# Patient Record
Sex: FEMALE | Race: WHITE | NOT HISPANIC OR LATINO | Employment: PART TIME | URBAN - METROPOLITAN AREA
[De-identification: names, ages, dates, MRNs, and addresses within clinical notes are randomized per-mention and may not be internally consistent; named-entity substitution may affect disease eponyms.]

---

## 2017-01-23 ENCOUNTER — OFFICE VISIT (OUTPATIENT)
Dept: MEDICAL GROUP | Facility: PHYSICIAN GROUP | Age: 67
End: 2017-01-23
Payer: COMMERCIAL

## 2017-01-23 VITALS
OXYGEN SATURATION: 97 % | DIASTOLIC BLOOD PRESSURE: 78 MMHG | WEIGHT: 180 LBS | SYSTOLIC BLOOD PRESSURE: 118 MMHG | HEIGHT: 63 IN | HEART RATE: 77 BPM | TEMPERATURE: 97.2 F | BODY MASS INDEX: 31.89 KG/M2

## 2017-01-23 DIAGNOSIS — E78.2 MIXED HYPERLIPIDEMIA: ICD-10-CM

## 2017-01-23 DIAGNOSIS — Z88.9 MULTIPLE ALLERGIES: ICD-10-CM

## 2017-01-23 DIAGNOSIS — J45.20 MILD INTERMITTENT ASTHMA WITHOUT COMPLICATION: ICD-10-CM

## 2017-01-23 DIAGNOSIS — E66.9 OBESITY (BMI 30-39.9): ICD-10-CM

## 2017-01-23 PROCEDURE — 99214 OFFICE O/P EST MOD 30 MIN: CPT | Performed by: FAMILY MEDICINE

## 2017-01-23 NOTE — MR AVS SNAPSHOT
"        Greta Tran   2017 5:00 PM   Office Visit   MRN: [id number]    Department:  [facility]   Dept Phone:  [phone]    Description:  Female : 1950   Provider:  Karlie Dimas D.O.           Reason for Visit     Establish Care           Allergies as of 2017     Allergen Noted Reactions    Cephalexin 2015   Nausea    Food 2015       Raspberry, carrots, green pepper, white potatoes, tomato, pork, turkey, tree nuts (cashew, hazelnut, walnut)    Levaquin 2015   Swelling    Shaky, throat swelling, joint pain      You were diagnosed with     Mild intermittent asthma without complication   [779791]       Multiple allergies   [509939]       Mixed hyperlipidemia   [272.2.ICD-9-CM]         Vital Signs     Blood Pressure Pulse Temperature Height Weight Body Mass Index    118/78 mmHg 77 36.2 °C (97.2 °F) 1.6 m (5' 3\") 81.647 kg (180 lb) 31.89 kg/m2    Oxygen Saturation Smoking Status                97% Former Smoker          Basic Information     Date Of Birth Sex Race Ethnicity Preferred Language    1950 Female White Non- English      Your appointments     2018  5:00 PM   Established Patient with Karlie Dimas D.O.   [address] [id number] [phone]           You will be receiving a confirmation call a few days before your appointment from our automated call confirmation system.              Problem List              ICD-10-CM Priority Class Noted - Resolved    Calculus of gallbladder K80.20   2015 - Present    Mild intermittent asthma without complication J45.20   2017 - Present    Multiple allergies Z88.9   2017 - Present    Mixed hyperlipidemia E78.2   2017 - Present      Health Maintenance        Date Due Completion Dates    IMM DTaP/Tdap/Td Vaccine (1 - Tdap) 5/3/1971 ---    PAP SMEAR 5/3/1973 ---    MAMMOGRAM 5/3/1992 ---    COLONOSCOPY 5/3/2002 ---            "   Current Immunizations     Influenza TIV (IM) 9/5/2015, 10/20/2014, 10/10/2011    Influenza Vaccine Quad Inj (Preserved) 9/23/2016    SHINGLES VACCINE 10/7/2014      Below and/or attached are the medications your provider expects you to take. Review all of your home medications and newly ordered medications with your provider and/or pharmacist. Follow medication instructions as directed by your provider and/or pharmacist. Please keep your medication list with you and share with your provider. Update the information when medications are discontinued, doses are changed, or new medications (including over-the-counter products) are added; and carry medication information at all times in the event of emergency situations     Allergies:  CEPHALEXIN - Nausea     FOOD - (reactions not documented)     LEVAQUIN - Swelling               Medications  Valid as of: January 23, 2017 -  5:38 PM    Generic Name Brand Name Tablet Size Instructions for use    Albuterol Sulfate (Aero Soln) albuterol 108 (90 BASE) MCG/ACT Inhale 1-2 Puffs by mouth every four hours as needed for Shortness of Breath.        Beclomethasone Dipropionate (Aero Soln) QVAR 80 MCG/ACT Inhale 1 Puff by mouth 2 times a day.        Fenofibrate (Tab) TRICOR 145 MG Take 145 mg by mouth every day.        Fexofenadine HCl (Tab) ALLEGRA 60 MG Take 60 mg by mouth every day.        Fluticasone Furoate (Suspension) VERAMYST 27.5 MCG/SPRAY Spray 2 Sprays in nose every day.        Fluticasone Propionate   Spray  in nose.        Fluticasone-Salmeterol   Inhale  by mouth.        .                 Medicines prescribed today were sent to:     [id number] [place] [place] - [address]    [address] [facility]. [place] [address]    Phone: [phone] Fax: [phone]    Open 24 Hours?: No      Medication refill instructions:       If your prescription bottle indicates you have medication refills left, it is not necessary to call your provider’s office. Please  contact your pharmacy and they will refill your medication.    If your prescription bottle indicates you do not have any refills left, you may request refills at any time through one of the following ways: The online [employer] system (except Urgent Care), by calling your provider’s office, or by asking your pharmacy to contact your provider’s office with a refill request. Medication refills are processed only during regular business hours and may not be available until the next business day. Your provider may request additional information or to have a follow-up visit with you prior to refilling your medication.   *Please Note: Medication refills are assigned a new Rx number when refilled electronically. Your pharmacy may indicate that no refills were authorized even though a new prescription for the same medication is available at the pharmacy. Please request the medicine by name with the pharmacy before contacting your provider for a refill.        Your To Do List     Future Labs/Procedures Complete By Expires    COMP METABOLIC PANEL  As directed 1/24/2018    LIPID PROFILE  As directed 1/24/2018    VITAMIN D,25 HYDROXY  As directed 1/24/2018         [employer] Access Code: [email or web address]  Expires: 2/22/2017  5:31 PM    [employer]  A secure, online tool to manage your health information     [employer]s [employer]® is a secure, online tool that connects you to your personalized health information from the privacy of your home -- day or night - making it very easy for you to manage your healthcare. Once the activation process is completed, you can even access your medical information using the [employer] adrienne, which is available for free in the Apple Adrienne store or Google Play store.     [employer] provides the following levels of access (as shown below):   My Chart Features   [facility] Doctor [facility]  Specialists [facility]  Urgent  Care Non-[facility]  Primary Care  Doctor   Email your healthcare team securely and privately  24/7 X X X    Manage appointments: schedule your next appointment; view details of past/upcoming appointments X      Request prescription refills. X      View recent personal medical records, including lab and immunizations X X X X   View health record, including health history, allergies, medications X X X X   Read reports about your outpatient visits, procedures, consult and ER notes X X X X   See your discharge summary, which is a recap of your hospital and/or ER visit that includes your diagnosis, lab results, and care plan. X X       How to register for [employer] ([email or web address]):  1. Go to  [email or web address].  2. Click on the Sign Up Now box, which takes you to the New Member Sign Up page. You will need to provide the following information:  a. Enter your [employer] ([email or web address]) Access Code exactly as it appears at the top of this page. (You will not need to use this code after you’ve completed the sign-up process. If you do not sign up before the expiration date, you must request a new code.)   b. Enter your date of birth.   c. Enter your home email address.   d. Click Submit, and follow the next screen’s instructions.  3. Create a [employer] ([email or web address]) ID. This will be your [employer] ([email or web address]) login ID and cannot be changed, so think of one that is secure and easy to remember.  4. Create a [employer] ([email or web address]) password. You can change your password at any time.  5. Enter your Password Reset Question and Answer. This can be used at a later time if you forget your password.   6. Enter your e-mail address. This allows you to receive e-mail notifications when new information is available in [employer] ([email or web address]).  7. Click Sign Up. You can now view your health information.    For assistance activating your [employer] ([email or web address]) account, call [phone]

## 2017-01-23 NOTE — Clinical Note
[facility]  Karlie Dimas D.O.  [address]  [facility] [address]  Fax: [phone] Authorization for Release/Disclosure of Protected Health Information   Name: GRETA TRAN : 1950 SSN: [id number]   Address: [address] [address]  [place] [address] Phone:    [phone] (home)    I authorize the entity listed below to release/disclose the PHI below to Pending sale to [facility]/Karlie Dimas D.O.   Provider or Entity Name:  Misty Murray     Address   [facility], [facility], [place]  [address] [address]  Phone:  [phone]    Fax:  [phone]   Reason for request: continuity of care   Information to be released:    [  ] LAST COLONOSCOPY, including any PATH REPORT [  ] LAST DEXA  [  ] LAST MAMMOGRAM  [  ] LAST PAP [  ] RETINA EXAM REPORT  [  ] IMMUNIZATION RECORDS  [x ] Release all info      [  ] Check here and initial the line next to each item to release ALL health information INCLUDING  _____ Care and treatment for drug and / or alcohol abuse  _____ HIV testing, infection status, or AIDS  _____ Genetic Testing    DATES OF SERVICE OR TIME PERIOD TO BE DISCLOSED: _____________  I understand and acknowledge that:  * This Authorization may be revoked at any time by you in writing, except if your health information has already been used or disclosed.  * Your health information that will be used or disclosed as a result of you signing this authorization could be re-disclosed by the recipient. If this occurs, your re-disclosed health information may no longer be protected by State or Federal laws.  * You may refuse to sign this Authorization. Your refusal will not affect your ability to obtain treatment.  * This Authorization becomes effective upon signing and will  on (date) __________. If no date is indicated, this Authorization will  one (1) year from the signature date.    Name: Greta Tran    Signature:     Date: 2017

## 2017-01-24 NOTE — ASSESSMENT & PLAN NOTE
Long-standing history. Patient reports 100% compliance with medication; she is currently on Advair. She reports that with this change in medication she is feeling much better and denies any issues with shortness of breath, cough, wheezing, chest pain.

## 2017-01-24 NOTE — ASSESSMENT & PLAN NOTE
Long-standing history. Patient reports 100% compliance with medication; she is currently on Flonase and Allegra daily. She reports that this manages her symptoms all the time but denies any concerns with sinus congestion, pressure, Raynaud's.

## 2017-01-24 NOTE — PROGRESS NOTES
Subjective:   Greta Tran is a 66 y.o. female here today for asthma; allergies; obesity; elevated TC/TG    Mild intermittent asthma without complication  Long-standing history. Patient reports 100% compliance with medication; she is currently on Advair. She reports that with this change in medication she is feeling much better and denies any issues with shortness of breath, cough, wheezing, chest pain.    Mixed hyperlipidemia  Long-standing history. Patient reports 100% compliance with medication; she is currently on fenofibrate 145 mg daily. She states that she has been stable on this medication for greater than 2 years and it has helped to control her cholesterol and triglycerides. She denies any side effects such as abdominal cramping or bloating.    Multiple allergies  Long-standing history. Patient reports 100% compliance with medication; she is currently on Flonase and Allegra daily. She reports that this manages her symptoms all the time but denies any concerns with sinus congestion, pressure, Raynaud's.    Obesity (BMI 30-39.9)  Ongoing issue. Patient reports that the holidays she was not able to get as much exercise and also she does not get as much exercise because her asthma was under control until recently. She states now she is feeling so much better she is actually starting to get exercise on a daily basis and is eating healthier.     Pt is here today to [facility] care.     Current medicines (including changes today)  Current Outpatient Prescriptions   Medication Sig Dispense Refill   • Fluticasone-Salmeterol (ADVAIR HFA INH) Inhale  by mouth.     • Fluticasone Propionate (FLONASE NA) Spray  in nose.     • fexofenadine (ALLEGRA) 60 MG TABS Take 60 mg by mouth every day.     • fenofibrate (TRICOR) 145 MG TABS Take 145 mg by mouth every day.     • fluticasone (VERAMYST) 27.5 MCG/SPRAY nasal spray Spray 2 Sprays in nose every day.     • albuterol (VENTOLIN OR PROVENTIL) 108 (90 BASE) MCG/ACT AERS  "inhalation aerosol Inhale 1-2 Puffs by mouth every four hours as needed for Shortness of Breath.     • beclomethasone (QVAR) 80 MCG/ACT inhaler Inhale 1 Puff by mouth 2 times a day.       No current facility-administered medications for this visit.     She  has a past medical history of Hypercholesterolemia; ASTHMA; Arthritis; and Breath shortness.    ROS   No chest pain, no shortness of breath, no abdominal pain       Objective:     Blood pressure 118/78, pulse 77, temperature 36.2 °C (97.2 °F), height 1.6 m (5' 3\"), weight 81.647 kg (180 lb), SpO2 97 %. Body mass index is 31.89 kg/(m^2).   Physical Exam:  Alert, oriented in no acute distress.  Eye contact is good, speech goal directed, affect calm  HEENT: conjunctiva non-injected, sclera non-icteric.  Pinna normal. TM pearly gray.   Oral mucous membranes pink and moist with no lesions.  Neck No adenopathy or masses in the neck or supraclavicular regions.  Lungs: clear to auscultation bilaterally with good excursion.  CV: regular rate and rhythm. Mild systolic ejection murmur noted  Abdomen: soft, nontender, No CVAT  Ext: no edema, color normal, vascularity normal, temperature normal  Neuro: CN 2-12 grossly intact      Assessment and Plan:   The following treatment plan was discussed     1. Mild intermittent asthma without complication      Stable. Continue current medications; monitor   2. Multiple allergies      Stable. Continue current medications; monitor   3. Mixed hyperlipidemia  COMP METABOLIC PANEL    LIPID PROFILE    VITAMIN D,25 HYDROXY    Stable. Continue current medications; monitor   4. Obesity (BMI 30-39.9)  Patient identified as having weight management issue.  Appropriate orders and counseling given.    Improving. Continue to encourage healthy eating habits and daily exercise. Monitor       Followup: Return in about 1 year (around 1/23/2018) for lipids/allergies/asthma, Short.            "

## 2017-01-24 NOTE — ASSESSMENT & PLAN NOTE
Ongoing issue. Patient reports that the holidays she was not able to get as much exercise and also she does not get as much exercise because her asthma was under control until recently. She states now she is feeling so much better she is actually starting to get exercise on a daily basis and is eating healthier.

## 2017-01-24 NOTE — ASSESSMENT & PLAN NOTE
Long-standing history. Patient reports 100% compliance with medication; she is currently on fenofibrate 145 mg daily. She states that she has been stable on this medication for greater than 2 years and it has helped to control her cholesterol and triglycerides. She denies any side effects such as abdominal cramping or bloating.

## 2017-02-21 ENCOUNTER — TELEPHONE (OUTPATIENT)
Dept: MEDICAL GROUP | Facility: PHYSICIAN GROUP | Age: 67
End: 2017-02-21

## 2017-02-21 LAB
25(OH)D3+25(OH)D2 SERPL-MCNC: 33 NG/ML (ref 30–100)
ALBUMIN SERPL-MCNC: 4.1 G/DL (ref 3.6–4.8)
ALBUMIN/GLOB SERPL: 1.6 {RATIO} (ref 1.1–2.5)
ALP SERPL-CCNC: 53 IU/L (ref 39–117)
ALT SERPL-CCNC: 20 IU/L (ref 0–32)
AST SERPL-CCNC: 22 IU/L (ref 0–40)
BILIRUB SERPL-MCNC: 0.2 MG/DL (ref 0–1.2)
BUN SERPL-MCNC: 15 MG/DL (ref 8–27)
BUN/CREAT SERPL: 18 (ref 11–26)
CALCIUM SERPL-MCNC: 9.4 MG/DL (ref 8.7–10.3)
CHLORIDE SERPL-SCNC: 105 MMOL/L (ref 96–106)
CHOLEST SERPL-MCNC: 140 MG/DL (ref 100–199)
CO2 SERPL-SCNC: 22 MMOL/L (ref 18–29)
COMMENT 011824: NORMAL
CREAT SERPL-MCNC: 0.85 MG/DL (ref 0.57–1)
GLOBULIN SER CALC-MCNC: 2.5 G/DL (ref 1.5–4.5)
GLUCOSE SERPL-MCNC: 99 MG/DL (ref 65–99)
HDLC SERPL-MCNC: 42 MG/DL
LDLC SERPL CALC-MCNC: 80 MG/DL (ref 0–99)
POTASSIUM SERPL-SCNC: 4.1 MMOL/L (ref 3.5–5.2)
PROT SERPL-MCNC: 6.6 G/DL (ref 6–8.5)
SODIUM SERPL-SCNC: 143 MMOL/L (ref 134–144)
TRIGL SERPL-MCNC: 89 MG/DL (ref 0–149)
VLDLC SERPL CALC-MCNC: 18 MG/DL (ref 5–40)

## 2017-02-21 NOTE — Clinical Note
February 21, 2017         Greta [address] [address]  [place] [address]        Dear Greta:       All labs are in a normal range.     Resulted Orders   COMP METABOLIC PANEL   Result Value Ref Range    Glucose 99 65 - 99 mg/dL    Bun 15 8 - 27 mg/dL    Creatinine 0.85 0.57 - 1.00 mg/dL    GFR If Non  73 >59 mL/min/1.73    GFR If  84 >59 mL/min/1.73    Bun-Creatinine Ratio 18 11 - 26    Sodium 143 134 - 144 mmol/L    Potassium 4.1 3.5 - 5.2 mmol/L    Chloride 105 96 - 106 mmol/L    Co2 22 18 - 29 mmol/L    Calcium 9.4 8.7 - 10.3 mg/dL    Total Protein 6.6 6.0 - 8.5 g/dL    Albumin 4.1 3.6 - 4.8 g/dL    Globulin 2.5 1.5 - 4.5 g/dL    A-G Ratio 1.6 1.1 - 2.5      Comment:      **Effective March 13, 2017 the reference interval**  for A/G Ratio will be changing to:  Age                Male          Female  0 -  7 days       1.1 - 2.3       1.1 - 2.3  8 - 30 days       1.2 - 2.8       1.2 - 2.8  1 -  6 months     1.3 - 3.6       1.3 - 3.6  7 months -  5 years      1.5 - 2.6       1.5 - 2.6  > 5 years      1.2 - 2.2       1.2 - 2.2      Total Bilirubin 0.2 0.0 - 1.2 mg/dL    Alkaline Phosphatase 53 39 - 117 IU/L    AST(SGOT) 22 0 - 40 IU/L    ALT(SGPT) 20 0 - 32 IU/L   LIPID PANEL   Result Value Ref Range    Cholesterol,Tot 140 100 - 199 mg/dL    Triglycerides 89 0 - 149 mg/dL    HDL 42 >39 mg/dL    VLDL Cholesterol Calc 18 5 - 40 mg/dL    LDL 80 0 - 99 mg/dL    Comment: CANCELED       Comment:      Result canceled by the ancillary   VITAMIN D 25-HYDROXY   Result Value Ref Range    25-Hydroxy   Vitamin D 25 33.0 30.0 - 100.0 ng/mL      Comment:      Vitamin D deficiency has been defined by the [place] of  Medicine and an Endocrine Society practice guideline as a  level of serum 25-OH vitamin D less than 20 ng/mL (1,2).  The Endocrine Society went on to further define vitamin D  insufficiency as a level between 21 and 29 ng/mL (2).  1. IOM ([place] of Medicine). 2010. Dietary  reference  intakes for calcium and D. [place]: The  National Academies Press.  2. Ashish MF, Chanel LEE, Cullen CARMEN, et al.  Evaluation, treatment, and prevention of vitamin D  deficiency: an Endocrine Society clinical practice  guideline. JCEM. 2011 Jul; 96(7):1911-30.         If you have any questions or concerns, please don't hesitate to call.        Sincerely,      Karlie Dimas D.O.    Electronically Signed

## 2017-02-21 NOTE — TELEPHONE ENCOUNTER
----- Message from aKrlie Dimas D.O. sent at 2/21/2017  8:34 AM PST -----  Please advise pt that all labs are in a normal range.    Karlie Dimas D.O.

## 2017-04-19 ENCOUNTER — OFFICE VISIT (OUTPATIENT)
Dept: URGENT CARE | Facility: PHYSICIAN GROUP | Age: 67
End: 2017-04-19
Payer: COMMERCIAL

## 2017-04-19 VITALS
SYSTOLIC BLOOD PRESSURE: 124 MMHG | BODY MASS INDEX: 31.01 KG/M2 | HEART RATE: 68 BPM | TEMPERATURE: 98.1 F | DIASTOLIC BLOOD PRESSURE: 80 MMHG | HEIGHT: 63 IN | WEIGHT: 175 LBS | OXYGEN SATURATION: 95 %

## 2017-04-19 DIAGNOSIS — J22 LRTI (LOWER RESPIRATORY TRACT INFECTION): ICD-10-CM

## 2017-04-19 DIAGNOSIS — K52.9 AGE (ACUTE GASTROENTERITIS): ICD-10-CM

## 2017-04-19 DIAGNOSIS — J45.20 MILD INTERMITTENT ASTHMA WITHOUT COMPLICATION: ICD-10-CM

## 2017-04-19 PROCEDURE — 99214 OFFICE O/P EST MOD 30 MIN: CPT | Performed by: FAMILY MEDICINE

## 2017-04-19 RX ORDER — AZITHROMYCIN 250 MG/1
TABLET, FILM COATED ORAL
Qty: 6 TAB | Refills: 0 | Status: SHIPPED | OUTPATIENT
Start: 2017-04-19 | End: 2017-10-13

## 2017-04-19 RX ORDER — PREDNISONE 20 MG/1
40 TABLET ORAL EVERY MORNING
Qty: 12 TAB | Refills: 0 | Status: SHIPPED | OUTPATIENT
Start: 2017-04-19 | End: 2017-04-25

## 2017-04-19 ASSESSMENT — ENCOUNTER SYMPTOMS
VOMITING: 0
CHILLS: 0
DIZZINESS: 0
COUGH: 1
NAUSEA: 0
ABDOMINAL PAIN: 0
SPUTUM PRODUCTION: 0
DIARRHEA: 1
BLOOD IN STOOL: 0
WHEEZING: 1
SORE THROAT: 0
ORTHOPNEA: 0
FEVER: 0
FOCAL WEAKNESS: 0

## 2017-04-19 NOTE — Clinical Note
April 19, 2017         Patient: Greta Tran   YOB: 1950   Date of Visit: 4/19/2017           To Whom it May Concern:    Greta Tran was seen in my clinic on 4/19/2017. She may return to work in 2-3 days.    If you have any questions or concerns, please don't hesitate to call.        Sincerely,           Pierre Owens M.D.  Electronically Signed

## 2017-04-20 NOTE — PROGRESS NOTES
Subjective:      Greta Tran is a 66 y.o. female who presents with Cough and Diarrhea    Chief Complaint   Patient presents with   • Cough     cough, worser at night x 3 days   • Diarrhea     diarrhea x 3 days         - This is a very pleasant 66 y.o. female with complaints of 3-4 days cough at night, dry, and some non bloody diarrhea in mornings. No fever           ALLERGIES:  Cephalexin; Food; and Levaquin     PMH:  Past Medical History   Diagnosis Date   • Hypercholesterolemia    • ASTHMA    • Arthritis    • Breath shortness      with exertion        MEDS:    Current outpatient prescriptions:   •  hydrocodone-homatropine 5-1.5 mg/5 mL 5-1.5 MG/5ML Syrup, Take 5 mL by mouth 4 times a day as needed., Disp: 120 mL, Rfl: 0  •  azithromycin (ZITHROMAX) 250 MG Tab, Use as directed, Disp: 6 Tab, Rfl: 0  •  predniSONE (DELTASONE) 20 MG Tab, Take 2 Tabs by mouth every morning for 6 days., Disp: 12 Tab, Rfl: 0  •  Fluticasone-Salmeterol (ADVAIR HFA INH), Inhale  by mouth., Disp: , Rfl:   •  Fluticasone Propionate (FLONASE NA), Spray  in nose., Disp: , Rfl:   •  fexofenadine (ALLEGRA) 60 MG TABS, Take 60 mg by mouth every day., Disp: , Rfl:   •  fenofibrate (TRICOR) 145 MG TABS, Take 145 mg by mouth every day., Disp: , Rfl:   •  fluticasone (VERAMYST) 27.5 MCG/SPRAY nasal spray, Spray 2 Sprays in nose every day., Disp: , Rfl:   •  albuterol (VENTOLIN OR PROVENTIL) 108 (90 BASE) MCG/ACT AERS inhalation aerosol, Inhale 1-2 Puffs by mouth every four hours as needed for Shortness of Breath., Disp: , Rfl:   •  beclomethasone (QVAR) 80 MCG/ACT inhaler, Inhale 1 Puff by mouth 2 times a day., Disp: , Rfl:     ** Past medical, social, family and surgical history otherwise negative or non contributory **             Cough  Associated symptoms include wheezing. Pertinent negatives include no chest pain, chills, fever or sore throat.   Diarrhea   Associated symptoms include coughing. Pertinent negatives include no abdominal  "pain, chills, fever or vomiting.       Review of Systems   Constitutional: Negative for fever and chills.   HENT: Negative for congestion and sore throat.    Respiratory: Positive for cough and wheezing. Negative for sputum production.    Cardiovascular: Negative for chest pain and orthopnea.   Gastrointestinal: Positive for diarrhea. Negative for nausea, vomiting, abdominal pain, blood in stool and melena.   Neurological: Negative for dizziness and focal weakness.          Objective:     /80 mmHg  Pulse 68  Temp(Src) 36.7 °C (98.1 °F)  Ht 1.6 m (5' 3\")  Wt 79.379 kg (175 lb)  BMI 31.01 kg/m2  SpO2 95%     Physical Exam   Constitutional: She appears well-developed. No distress.   HENT:   Head: Normocephalic and atraumatic.   Mouth/Throat: Oropharynx is clear and moist.   Eyes: Conjunctivae are normal.   Neck: Neck supple.   Cardiovascular: Regular rhythm.    No murmur heard.  Pulmonary/Chest: Effort normal. She has wheezes.   Abdominal: Soft. There is no tenderness.   Neurological: She is alert. She exhibits normal muscle tone.   Skin: Skin is warm and dry.   Psychiatric: She has a normal mood and affect. Judgment normal.   Nursing note and vitals reviewed.              Assessment/Plan:         1. LRTI (lower respiratory tract infection)  hydrocodone-homatropine 5-1.5 mg/5 mL 5-1.5 MG/5ML Syrup    azithromycin (ZITHROMAX) 250 MG Tab    predniSONE (DELTASONE) 20 MG Tab   2. AGE (acute gastroenteritis)  hydrocodone-homatropine 5-1.5 mg/5 mL 5-1.5 MG/5ML Syrup   3. Mild intermittent asthma without complication               Dx & d/c instructions discussed w/ patient and/or family members. Follow up w/ Prvt Dr or here in 3-4 days if not getting better, sooner if needed,  ER if worse and UC/PCP unavailable.        Possible side effects (i.e. Rash, GI upset/constipation, sedation, elevation of BP or sugars) of any medications given discussed.                    "

## 2017-04-22 DIAGNOSIS — R05.9 COUGH: ICD-10-CM

## 2017-04-22 RX ORDER — BENZONATATE 100 MG/1
100 CAPSULE ORAL 3 TIMES DAILY PRN
Qty: 30 CAP | Refills: 0 | Status: SHIPPED | OUTPATIENT
Start: 2017-04-22 | End: 2017-10-13

## 2017-04-22 NOTE — NON-PROVIDER
Pt Called Regarding her Prescription of hydrocodone cough syrup asking for a change to a non narcotic cough suppressant was prescribed mat westfall by Maxine MA on 4/22/17.    Signed  Saman Aaron  Medical Assistant

## 2017-04-24 ENCOUNTER — OFFICE VISIT (OUTPATIENT)
Dept: URGENT CARE | Facility: PHYSICIAN GROUP | Age: 67
End: 2017-04-24
Payer: COMMERCIAL

## 2017-04-24 VITALS
WEIGHT: 175 LBS | SYSTOLIC BLOOD PRESSURE: 124 MMHG | HEART RATE: 70 BPM | OXYGEN SATURATION: 97 % | RESPIRATION RATE: 16 BRPM | DIASTOLIC BLOOD PRESSURE: 84 MMHG | TEMPERATURE: 97.2 F | BODY MASS INDEX: 31.01 KG/M2 | HEIGHT: 63 IN

## 2017-04-24 DIAGNOSIS — J22 ACUTE LOWER RESPIRATORY INFECTION: ICD-10-CM

## 2017-04-24 DIAGNOSIS — H69.93 EUSTACHIAN TUBE DYSFUNCTION, BILATERAL: ICD-10-CM

## 2017-04-24 DIAGNOSIS — R42 DIZZINESS: ICD-10-CM

## 2017-04-24 PROCEDURE — 99214 OFFICE O/P EST MOD 30 MIN: CPT | Performed by: PHYSICIAN ASSISTANT

## 2017-04-24 RX ORDER — DOXYCYCLINE HYCLATE 100 MG
100 TABLET ORAL 2 TIMES DAILY
Qty: 20 TAB | Refills: 0 | Status: SHIPPED | OUTPATIENT
Start: 2017-04-24 | End: 2017-05-04

## 2017-04-24 ASSESSMENT — ENCOUNTER SYMPTOMS
DIZZINESS: 1
COUGH: 1
WHEEZING: 1
CHILLS: 1
SPUTUM PRODUCTION: 1
FEVER: 0
HEADACHES: 1

## 2017-04-24 NOTE — PROGRESS NOTES
Subjective:      Greta Tran is a 66 y.o. female who presents with Dizziness    PMH:  has a past medical history of Hypercholesterolemia; ASTHMA; Arthritis; and Breath shortness.  MEDS:   Current outpatient prescriptions:   •  benzonatate (TESSALON) 100 MG Cap, Take 1 Cap by mouth 3 times a day as needed for Cough., Disp: 30 Cap, Rfl: 0  •  predniSONE (DELTASONE) 20 MG Tab, Take 2 Tabs by mouth every morning for 6 days., Disp: 12 Tab, Rfl: 0  •  Fluticasone-Salmeterol (ADVAIR HFA INH), Inhale  by mouth., Disp: , Rfl:   •  Fluticasone Propionate (FLONASE NA), Spray  in nose., Disp: , Rfl:   •  albuterol (VENTOLIN OR PROVENTIL) 108 (90 BASE) MCG/ACT AERS inhalation aerosol, Inhale 1-2 Puffs by mouth every four hours as needed for Shortness of Breath., Disp: , Rfl:   •  beclomethasone (QVAR) 80 MCG/ACT inhaler, Inhale 1 Puff by mouth 2 times a day., Disp: , Rfl:   •  fexofenadine (ALLEGRA) 60 MG TABS, Take 60 mg by mouth every day., Disp: , Rfl:   •  fenofibrate (TRICOR) 145 MG TABS, Take 145 mg by mouth every day., Disp: , Rfl:   •  fluticasone (VERAMYST) 27.5 MCG/SPRAY nasal spray, Spray 2 Sprays in nose every day., Disp: , Rfl:   •  hydrocodone-homatropine 5-1.5 mg/5 mL 5-1.5 MG/5ML Syrup, Take 5 mL by mouth 4 times a day as needed., Disp: 120 mL, Rfl: 0  •  azithromycin (ZITHROMAX) 250 MG Tab, Use as directed, Disp: 6 Tab, Rfl: 0  ALLERGIES:   Allergies   Allergen Reactions   • Cephalexin Nausea   • Food      Raspberry, carrots, green pepper, white potatoes, tomato, pork, turkey, tree nuts (cashew, hazelnut, walnut)   • Levaquin Swelling     Shaky, throat swelling, joint pain     SURGHX:   Past Surgical History   Procedure Laterality Date   • Gyn surgery  1993     hysterectomy   • Nasal polypectomy     • Gisella by laparoscopy N/A 6/19/2015     Procedure: GISELLA BY LAPAROSCOPY;  Surgeon: Grace Montgomery M.D.;  Location: SURGERY [facility];  Service:    • Abdominal hysterectomy total       SOCHX:   "reports that she quit smoking about 16 years ago. Her smoking use included Cigarettes. She has a 25 pack-year smoking history. She has never used smokeless tobacco. She reports that she drinks alcohol. She reports that she does not use illicit drugs.  FH: family history includes Cancer in her mother and sister; Heart Disease in her brother; Hypertension in her father; Stroke in her father. Reviewed with patient/family. Not pertinent to this complaint.          HPI Comments: Patient presents with:  Dizziness: Dizziness, headache, onset this morning.  Cough for a few weeks, on last day of zpack. PT states she felt better the first 2 days of the zpack, but now states she is feeling worse again.          Cough  This is a new problem. The current episode started 1 to 4 weeks ago. The problem has been gradually worsening. The problem occurs every few minutes. The cough is productive of sputum. Associated symptoms include chills, headaches and wheezing. Pertinent negatives include no fever. The symptoms are aggravated by exercise and lying down. Risk factors for lung disease include smoking/tobacco exposure. She has tried body position changes and OTC cough suppressant for the symptoms. The treatment provided mild relief. Her past medical history is significant for asthma, bronchitis and environmental allergies.       Review of Systems   Constitutional: Positive for chills and malaise/fatigue. Negative for fever.   Respiratory: Positive for cough, sputum production and wheezing.    Neurological: Positive for dizziness and headaches.   Endo/Heme/Allergies: Positive for environmental allergies.   All other systems reviewed and are negative.         Objective:     /84 mmHg  Pulse 70  Temp(Src) 36.2 °C (97.2 °F)  Resp 16  Ht 1.6 m (5' 3\")  Wt 79.379 kg (175 lb)  BMI 31.01 kg/m2  SpO2 97%     Physical Exam   Constitutional: She appears well-developed and well-nourished. No distress.   HENT:   Head: Normocephalic. "   Right Ear: A middle ear effusion is present.   Left Ear: A middle ear effusion is present.   Nose: Rhinorrhea present.   Mouth/Throat: Uvula is midline and oropharynx is clear and moist.   Eyes: EOM are normal. Pupils are equal, round, and reactive to light.   Neck: Normal range of motion. Neck supple.   Cardiovascular: Normal rate and regular rhythm.    Pulmonary/Chest: Effort normal. No respiratory distress. She has wheezes. She has rhonchi. She has no rales.   Abdominal: Soft.   Musculoskeletal: Normal range of motion.   Lymphadenopathy:     She has no cervical adenopathy.   Neurological: She is alert.   Skin: Skin is warm and dry.   Psychiatric: She has a normal mood and affect.   Nursing note and vitals reviewed.       Pt declined neb treatment in clinic today.   Assessment/Plan:     1. Acute lower respiratory infection  doxycycline (VIBRAMYCIN) 100 MG Tab   2. Eustachian tube dysfunction, bilateral  doxycycline (VIBRAMYCIN) 100 MG Tab   3. Dizziness  doxycycline (VIBRAMYCIN) 100 MG Tab     I believe the dizziness is due to the fluid in bilateral ears as her neuro exam is grossly normal.      I will prescribe pt doxycycline as her sxs have not resolved with first round of abx.  PT will continue using her inhaler as directed.      PT should follow up with PCP in 1-2 days for re-evaluation if symptoms have not improved.  Discussed red flags and reasons to return to UC or ED.  Pt and/or family verbalized understanding of diagnosis and follow up instructions and was given informational handout on diagnosis.  PT discharged.

## 2017-04-24 NOTE — Clinical Note
April 24, 2017         Patient: Greta Tran   YOB: 1950   Date of Visit: 4/24/2017           To Whom it May Concern:    Greta Tran was seen in my clinic on 4/24/2017. She may return to work on 04/27/2017 or sooner if condition improves.    If you have any questions or concerns, please don't hesitate to call.        Sincerely,           Asuncion Ward PA-C  Electronically Signed

## 2017-09-02 ENCOUNTER — OFFICE VISIT (OUTPATIENT)
Dept: URGENT CARE | Facility: PHYSICIAN GROUP | Age: 67
End: 2017-09-02
Payer: MEDICARE

## 2017-09-02 VITALS
DIASTOLIC BLOOD PRESSURE: 86 MMHG | SYSTOLIC BLOOD PRESSURE: 130 MMHG | RESPIRATION RATE: 14 BRPM | WEIGHT: 175 LBS | HEIGHT: 63 IN | HEART RATE: 70 BPM | BODY MASS INDEX: 31.01 KG/M2 | TEMPERATURE: 97.5 F | OXYGEN SATURATION: 95 %

## 2017-09-02 DIAGNOSIS — R09.82 POST-NASAL DRAINAGE: ICD-10-CM

## 2017-09-02 DIAGNOSIS — J01.00 ACUTE MAXILLARY SINUSITIS, RECURRENCE NOT SPECIFIED: ICD-10-CM

## 2017-09-02 PROCEDURE — 99214 OFFICE O/P EST MOD 30 MIN: CPT | Performed by: PHYSICIAN ASSISTANT

## 2017-09-02 RX ORDER — DOXYCYCLINE HYCLATE 100 MG
100 TABLET ORAL 2 TIMES DAILY
Qty: 10 TAB | Refills: 0 | Status: SHIPPED | OUTPATIENT
Start: 2017-09-02 | End: 2017-09-07

## 2017-09-02 ASSESSMENT — PATIENT HEALTH QUESTIONNAIRE - PHQ9: CLINICAL INTERPRETATION OF PHQ2 SCORE: 0

## 2017-09-02 ASSESSMENT — PAIN SCALES - GENERAL: PAINLEVEL: NO PAIN

## 2017-09-03 ASSESSMENT — ENCOUNTER SYMPTOMS
DIAPHORESIS: 0
ABDOMINAL PAIN: 0
DIZZINESS: 0
EYE REDNESS: 0
SORE THROAT: 1
MYALGIAS: 0
SHORTNESS OF BREATH: 0
DIARRHEA: 0
TINGLING: 0
EYE DISCHARGE: 0
CHILLS: 1
WHEEZING: 0
VOMITING: 0
NECK PAIN: 0
SINUS PRESSURE: 1
FEVER: 0
COUGH: 0
HOARSE VOICE: 0
HEADACHES: 1
SWOLLEN GLANDS: 1

## 2017-09-03 NOTE — PROGRESS NOTES
"Subjective:      Greta Tran is a 67 y.o. female who presents with Pharyngitis (x4 days head pressure and throat pain. small cough)            Pt is 66 y/o female who presents with facial pressure, tooth pressure, congestion, and drainage for more than a week. She reports minimal improvement with OTC meds.       Sinusitis   This is a new problem. The problem has been gradually worsening since onset. There has been no fever. Her pain is at a severity of 4/10. The pain is moderate. Associated symptoms include chills, congestion, headaches, sinus pressure, a sore throat and swollen glands. Pertinent negatives include no coughing, diaphoresis, ear pain, hoarse voice, neck pain or shortness of breath. Past treatments include saline sprays and spray decongestants (Allegra, Flonase. ). The treatment provided mild relief.       Review of Systems   Constitutional: Positive for chills and malaise/fatigue. Negative for diaphoresis and fever.   HENT: Positive for congestion, sinus pressure and sore throat. Negative for ear discharge, ear pain and hoarse voice.         Pos. For ear pressure     Eyes: Negative for discharge and redness.   Respiratory: Negative for cough, shortness of breath and wheezing.    Cardiovascular: Negative for chest pain and leg swelling.   Gastrointestinal: Negative for abdominal pain, diarrhea and vomiting.   Genitourinary: Negative for dysuria and urgency.   Musculoskeletal: Negative for myalgias and neck pain.   Skin: Negative for itching and rash.   Neurological: Positive for headaches. Negative for dizziness and tingling.          Objective:     /86   Pulse 70   Temp 36.4 °C (97.5 °F)   Resp 14   Ht 1.6 m (5' 3\")   Wt 79.4 kg (175 lb)   SpO2 95%   Breastfeeding? No   BMI 31.00 kg/m²    PMH:  has a past medical history of Arthritis; ASTHMA; Breath shortness; and Hypercholesterolemia.  MEDS:   Current Outpatient Prescriptions:   •  doxycycline (VIBRAMYCIN) 100 MG Tab, Take 1 " Tab by mouth 2 times a day for 5 days., Disp: 10 Tab, Rfl: 0  •  Fluticasone-Salmeterol (ADVAIR HFA INH), Inhale  by mouth., Disp: , Rfl:   •  fexofenadine (ALLEGRA) 60 MG TABS, Take 60 mg by mouth every day., Disp: , Rfl:   •  fenofibrate (TRICOR) 145 MG TABS, Take 145 mg by mouth every day., Disp: , Rfl:   •  fluticasone (VERAMYST) 27.5 MCG/SPRAY nasal spray, Spray 2 Sprays in nose every day., Disp: , Rfl:   •  benzonatate (TESSALON) 100 MG Cap, Take 1 Cap by mouth 3 times a day as needed for Cough., Disp: 30 Cap, Rfl: 0  •  hydrocodone-homatropine 5-1.5 mg/5 mL 5-1.5 MG/5ML Syrup, Take 5 mL by mouth 4 times a day as needed., Disp: 120 mL, Rfl: 0  •  azithromycin (ZITHROMAX) 250 MG Tab, Use as directed, Disp: 6 Tab, Rfl: 0  •  Fluticasone Propionate (FLONASE NA), Spray  in nose., Disp: , Rfl:   •  albuterol (VENTOLIN OR PROVENTIL) 108 (90 BASE) MCG/ACT AERS inhalation aerosol, Inhale 1-2 Puffs by mouth every four hours as needed for Shortness of Breath., Disp: , Rfl:   •  beclomethasone (QVAR) 80 MCG/ACT inhaler, Inhale 1 Puff by mouth 2 times a day., Disp: , Rfl:   ALLERGIES:   Allergies   Allergen Reactions   • Cephalexin Nausea   • Food      Raspberry, carrots, green pepper, white potatoes, tomato, pork, turkey, tree nuts (cashew, hazelnut, walnut)   • Levaquin Swelling     Shaky, throat swelling, joint pain     SURGHX:   Past Surgical History:   Procedure Laterality Date   • JOSELITO BY LAPAROSCOPY N/A 6/19/2015    Procedure: JOSELITO BY LAPAROSCOPY;  Surgeon: Grace Montgomery M.D.;  Location: SURGERY [facility];  Service:    • GYN SURGERY  1993    hysterectomy   • ABDOMINAL HYSTERECTOMY TOTAL     • NASAL POLYPECTOMY       SOCHX:  reports that she quit smoking about 17 years ago. Her smoking use included Cigarettes. She has a 25.00 pack-year smoking history. She has never used smokeless tobacco. She reports that she drinks alcohol. She reports that she does not use drugs.  FH: Family history was reviewed, no  pertinent findings to report    Physical Exam   Constitutional: She is oriented to person, place, and time. She appears well-developed and well-nourished.   HENT:   Head: Normocephalic and atraumatic.   Mouth/Throat: No oropharyngeal exudate.   Bilateral clear effusions- without bulge or erythema to the TM.   Posterior oropharynx with pos. PND without tonsillar edema or erythema.   Nose- boggy turbinates with mild-moderate amount of nasal discharge. Bilateral maxillary sinus tenderness with percussion.    Eyes: EOM are normal. Pupils are equal, round, and reactive to light.   Neck: Normal range of motion. Neck supple.   Cardiovascular: Normal rate and regular rhythm.    Pulmonary/Chest: Effort normal and breath sounds normal. No respiratory distress. She has no wheezes.   Musculoskeletal: Normal range of motion. She exhibits no edema.   Lymphadenopathy:     She has no cervical adenopathy.   Neurological: She is alert and oriented to person, place, and time.   Skin: Skin is warm. No rash noted.   Psychiatric: She has a normal mood and affect. Her behavior is normal.   Vitals reviewed.              Assessment/Plan:     1. Acute maxillary sinusitis, recurrence not specified  - doxycycline (VIBRAMYCIN) 100 MG Tab; Take 1 Tab by mouth 2 times a day for 5 days.  Dispense: 10 Tab; Refill: 0    2. Post-nasal drainage    Due to failure of OTC meds, duration of symptoms today, tenderness on exam- Doxycycline was tried today. Other supportive therapies encouraged. Continue Flonase, Allegra.   Patient given precautionary s/sx that mandate immediate follow up and evaluation in the ED. Advised of risks of not doing so.    DDX, Supportive care, and indications for immediate follow-up discussed with patient.    Instructed to return to clinic or nearest emergency department if we are not available for any change in condition, further concerns, or worsening of symptoms.    The patient demonstrated a good understanding and agreed with  the treatment plan.

## 2017-09-05 DIAGNOSIS — J32.9 SINUSITIS, UNSPECIFIED CHRONICITY, UNSPECIFIED LOCATION: ICD-10-CM

## 2017-09-05 RX ORDER — SULFAMETHOXAZOLE AND TRIMETHOPRIM 800; 160 MG/1; MG/1
1 TABLET ORAL EVERY 12 HOURS
Qty: 20 TAB | Refills: 0 | Status: SHIPPED | OUTPATIENT
Start: 2017-09-05 | End: 2017-09-15

## 2017-09-05 NOTE — PROGRESS NOTES
Patient called stating having a reaction to medication, doxycycline, and needing an additional medication to cover for sinus infection. A prescription for Bactrim has been sent to pharmacy. She is instructed to stop taking her doxycycline.

## 2017-10-13 ENCOUNTER — OFFICE VISIT (OUTPATIENT)
Dept: MEDICAL GROUP | Facility: PHYSICIAN GROUP | Age: 67
End: 2017-10-13
Payer: MEDICARE

## 2017-10-13 VITALS
HEIGHT: 63 IN | DIASTOLIC BLOOD PRESSURE: 84 MMHG | WEIGHT: 174 LBS | SYSTOLIC BLOOD PRESSURE: 122 MMHG | RESPIRATION RATE: 16 BRPM | HEART RATE: 74 BPM | BODY MASS INDEX: 30.83 KG/M2 | OXYGEN SATURATION: 94 % | TEMPERATURE: 98.3 F

## 2017-10-13 DIAGNOSIS — Z88.9 MULTIPLE ALLERGIES: ICD-10-CM

## 2017-10-13 DIAGNOSIS — Z23 NEED FOR VACCINATION: ICD-10-CM

## 2017-10-13 DIAGNOSIS — E78.2 MIXED HYPERLIPIDEMIA: ICD-10-CM

## 2017-10-13 DIAGNOSIS — J45.20 MILD INTERMITTENT ASTHMA WITHOUT COMPLICATION: ICD-10-CM

## 2017-10-13 PROCEDURE — G0009 ADMIN PNEUMOCOCCAL VACCINE: HCPCS | Performed by: FAMILY MEDICINE

## 2017-10-13 PROCEDURE — 90662 IIV NO PRSV INCREASED AG IM: CPT | Performed by: FAMILY MEDICINE

## 2017-10-13 PROCEDURE — G0008 ADMIN INFLUENZA VIRUS VAC: HCPCS | Performed by: FAMILY MEDICINE

## 2017-10-13 PROCEDURE — 99214 OFFICE O/P EST MOD 30 MIN: CPT | Mod: 25 | Performed by: FAMILY MEDICINE

## 2017-10-13 PROCEDURE — 90670 PCV13 VACCINE IM: CPT | Performed by: FAMILY MEDICINE

## 2017-10-14 NOTE — ASSESSMENT & PLAN NOTE
Ongoing issue. Patient reports compliance with her Advair and fluticasone. She denies any issues with productive cough, shortness of breath, chest pain.

## 2017-10-14 NOTE — PROGRESS NOTES
Subjective:   Greta Tran is a 67 y.o. female here today forAllergies, asthma, elevated cholesterol    Multiple allergies  Ongoing issue. Patient reports that she continues to have issues with allergies but does get some relief with Allegra. She currently reports some mild sinus congestion but no pressure or runny nose. She denies any fevers or chills.    Mixed hyperlipidemia  Ongoing issue. Patient reports that last week she started having issues with muscle cramps, weakness, headaches, and fatigue. She attributed this to her cholesterol medicine. She stopped her cholesterol medicine 5 days ago and is started to feel significantly better. Of note, patient had been on his cholesterol medicine for greater than 5 years and had never had any issues until recently.    Mild intermittent asthma without complication  Ongoing issue. Patient reports compliance with her Advair and fluticasone. She denies any issues with productive cough, shortness of breath, chest pain.         Current medicines (including changes today)  Current Outpatient Prescriptions   Medication Sig Dispense Refill   • Fluticasone-Salmeterol (ADVAIR HFA INH) Inhale  by mouth.     • fexofenadine (ALLEGRA) 60 MG TABS Take 60 mg by mouth every day.     • fluticasone (VERAMYST) 27.5 MCG/SPRAY nasal spray Spray 2 Sprays in nose every day.     • albuterol (VENTOLIN OR PROVENTIL) 108 (90 BASE) MCG/ACT AERS inhalation aerosol Inhale 1-2 Puffs by mouth every four hours as needed for Shortness of Breath.     • fenofibrate (TRICOR) 145 MG TABS Take 145 mg by mouth every day.       No current facility-administered medications for this visit.      She  has a past medical history of Arthritis; ASTHMA; Breath shortness; and Hypercholesterolemia.    ROS   No chest pain, no shortness of breath, no abdominal pain  +Sinus congestion; muscle cramps     Objective:     Blood pressure 122/84, pulse 74, temperature 36.8 °C (98.3 °F), resp. rate 16, height 1.6 m (5'  "3\"), weight 78.9 kg (174 lb), SpO2 94 %. Body mass index is 30.82 kg/m².   Physical Exam:  Alert, oriented in no acute distress.  Eye contact is good, speech goal directed, affect calm  HEENT: conjunctiva non-injected, sclera non-icteric.  Pinna normal. TM pearly gray.   Oral mucous membranes pink and moist with no lesions.  Neck No adenopathy or masses in the neck or supraclavicular regions.  Lungs: clear to auscultation bilaterally with good excursion.  CV: regular rate and rhythm.  Abdomen: soft, nontender, No CVAT  Ext: no edema, color normal, vascularity normal, temperature normal  Neuro: Cranial 2 through 12 grossly intact      Assessment and Plan:   The following treatment plan was discussed     1. Mixed hyperlipidemia  LIPID PROFILE    Stable. Patient is now stopped her medication. She will stay off of it for 3 months and we will recheck her labs at that time.   2. Multiple allergies      Stable. Continue over-the-counter medication; monitor   3. Mild intermittent asthma without complication      Stable. Continue current medications; monitor   4. Need for vaccination  INFLUENZA VACCINE, HIGH DOSE (65+ ONLY)    PNEUMOCOCCAL CONJUGATE VACCINE 13-VALENT    Age appropriate immunization (flu and pneumonia) provided; patient tolerated procedure well.       Followup: Return in about 3 months (around 1/13/2018) for lab review, Short.            "

## 2017-10-14 NOTE — ASSESSMENT & PLAN NOTE
Ongoing issue. Patient reports that she continues to have issues with allergies but does get some relief with Allegra. She currently reports some mild sinus congestion but no pressure or runny nose. She denies any fevers or chills.

## 2017-11-16 ENCOUNTER — OFFICE VISIT (OUTPATIENT)
Dept: MEDICAL GROUP | Facility: PHYSICIAN GROUP | Age: 67
End: 2017-11-16
Payer: MEDICARE

## 2017-11-16 VITALS
BODY MASS INDEX: 31.89 KG/M2 | WEIGHT: 180 LBS | DIASTOLIC BLOOD PRESSURE: 62 MMHG | SYSTOLIC BLOOD PRESSURE: 118 MMHG | TEMPERATURE: 98.9 F | HEART RATE: 88 BPM | OXYGEN SATURATION: 95 % | HEIGHT: 63 IN

## 2017-11-16 DIAGNOSIS — M54.9 ACUTE UPPER BACK PAIN: ICD-10-CM

## 2017-11-16 PROCEDURE — 99213 OFFICE O/P EST LOW 20 MIN: CPT | Performed by: FAMILY MEDICINE

## 2017-11-16 NOTE — ASSESSMENT & PLAN NOTE
New problem. Patient reports that she started having pain across her upper back approximately 2 days ago. She reports that the pain is sharp; persistent throughout the day; worse with activity. Patient reports the pain is extremely bad first thing in the morning but does resolve slightly over the course of the day. She has been using over-the-counter ibuprofen along with a heating pad and this is been mildly beneficial. She denies any numbness or tingling; she denies any weakness in the upper arm; she denies any decrease in active range of motion.

## 2017-11-16 NOTE — LETTER
November 16, 2017        Greta Tran   [address] [id number]  [facility] [address]        To Whom It May Concern:    Greta cannot lift anything over 10 pounds for the remaining part of this week. She may return to full duty starting Monday 20, 2017.    If you have any questions or concerns, please don't hesitate to call.        Sincerely,        Kalrie Dimas D.O.    Electronically Signed

## 2017-11-17 NOTE — PROGRESS NOTES
"Subjective:   Greta Tran is a 67 y.o. female here today forUpper back pain    Acute upper back pain  New problem. Patient reports that she started having pain across her upper back approximately 2 days ago. She reports that the pain is sharp; persistent throughout the day; worse with activity. Patient reports the pain is extremely bad first thing in the morning but does resolve slightly over the course of the day. She has been using over-the-counter ibuprofen along with a heating pad and this is been mildly beneficial. She denies any numbness or tingling; she denies any weakness in the upper arm; she denies any decrease in active range of motion.         Current medicines (including changes today)  Current Outpatient Prescriptions   Medication Sig Dispense Refill   • Fluticasone-Salmeterol (ADVAIR HFA INH) Inhale  by mouth.     • fexofenadine (ALLEGRA) 60 MG TABS Take 60 mg by mouth every day.     • fenofibrate (TRICOR) 145 MG TABS Take 145 mg by mouth every day.     • fluticasone (VERAMYST) 27.5 MCG/SPRAY nasal spray Spray 2 Sprays in nose every day.     • albuterol (VENTOLIN OR PROVENTIL) 108 (90 BASE) MCG/ACT AERS inhalation aerosol Inhale 1-2 Puffs by mouth every four hours as needed for Shortness of Breath.       No current facility-administered medications for this visit.      She  has a past medical history of Arthritis; ASTHMA; Breath shortness; and Hypercholesterolemia.    ROS   No chest pain, no shortness of breath, no abdominal pain  +Upper back pain     Objective:     Blood pressure 118/62, pulse 88, temperature 37.2 °C (98.9 °F), height 1.6 m (5' 3\"), weight 81.6 kg (180 lb), SpO2 95 %. Body mass index is 31.89 kg/m².   Physical Exam:  Alert, oriented in no acute distress.  Eye contact is good, speech goal directed, affect calm  HEENT: conjunctiva non-injected, sclera non-icteric.  Pinna normal.   Oral mucous membranes pink and moist with no lesions.  Neck No adenopathy or masses in the neck " or supraclavicular regions.  Lungs: clear to auscultation bilaterally with good excursion.  CV: regular rate and rhythm.  Ext: no edema, color normal, vascularity normal, temperature normal  MSK: Upper back-significant tenderness to palpation along bilateral upper trapezius and bilateral rhomboids; normal active range of motion in the glenohumeral joint bilaterally; negative provocative testing of the glenohumeral joints      Assessment and Plan:   The following treatment plan was discussed     1. Acute upper back pain      Uncontrolled; continue over-the-counter ibuprofen as needed for pain management; continue heating pad; stretching exercises provided; no heavy lifting for the Next 5 days; letter for work provided. Monitor        Followup: Return if symptoms worsen or fail to improve.

## 2018-01-09 ENCOUNTER — HOSPITAL ENCOUNTER (OUTPATIENT)
Dept: LAB | Facility: MEDICAL CENTER | Age: 68
End: 2018-01-09
Attending: FAMILY MEDICINE
Payer: MEDICARE

## 2018-01-09 DIAGNOSIS — E78.2 MIXED HYPERLIPIDEMIA: ICD-10-CM

## 2018-01-09 PROCEDURE — 36415 COLL VENOUS BLD VENIPUNCTURE: CPT

## 2018-01-09 PROCEDURE — 80061 LIPID PANEL: CPT

## 2018-01-10 LAB
CHOLEST SERPL-MCNC: 125 MG/DL (ref 100–199)
HDLC SERPL-MCNC: 57 MG/DL
LDLC SERPL CALC-MCNC: 51 MG/DL
TRIGL SERPL-MCNC: 87 MG/DL (ref 0–149)

## 2018-01-17 ENCOUNTER — OFFICE VISIT (OUTPATIENT)
Dept: MEDICAL GROUP | Facility: PHYSICIAN GROUP | Age: 68
End: 2018-01-17
Payer: MEDICARE

## 2018-01-17 VITALS
TEMPERATURE: 97.8 F | DIASTOLIC BLOOD PRESSURE: 62 MMHG | HEIGHT: 63 IN | BODY MASS INDEX: 32.43 KG/M2 | HEART RATE: 74 BPM | SYSTOLIC BLOOD PRESSURE: 116 MMHG | OXYGEN SATURATION: 95 % | WEIGHT: 183 LBS

## 2018-01-17 DIAGNOSIS — Z88.9 MULTIPLE ALLERGIES: ICD-10-CM

## 2018-01-17 DIAGNOSIS — E78.2 MIXED HYPERLIPIDEMIA: ICD-10-CM

## 2018-01-17 PROCEDURE — 99214 OFFICE O/P EST MOD 30 MIN: CPT | Performed by: FAMILY MEDICINE

## 2018-01-17 RX ORDER — FLUTICASONE PROPIONATE 50 MCG
1 SPRAY, SUSPENSION (ML) NASAL DAILY
COMMUNITY

## 2018-01-17 RX ORDER — FENOFIBRATE 145 MG/1
145 TABLET, COATED ORAL DAILY
Qty: 90 TAB | Refills: 3 | Status: SHIPPED | OUTPATIENT
Start: 2018-01-17 | End: 2018-02-16

## 2018-01-17 NOTE — ASSESSMENT & PLAN NOTE
Ongoing issue. Patient was restarted on fenofibrate 145 mg daily. She reports that she is tolerating and is not having any side effects such as abdominal cramping, bloating, diarrhea. Review of labs show that her lipid panel is within recommended range.

## 2018-01-17 NOTE — PROGRESS NOTES
"Subjective:   Greta Tran is a 67 y.o. female here today for allergies and elevated lipids    Multiple allergies  Ongoing issue. Patient reports compliance with Allegra and Flonase. She states that she is not having any allergy symptoms but she is having some upper respiratory symptoms. She states that they are mild in nature and she is able to continue to work. She does report though that she is feeling some fatigue and tiredness.    Mixed hyperlipidemia  Ongoing issue. Patient was restarted on fenofibrate 145 mg daily. She reports that she is tolerating and is not having any side effects such as abdominal cramping, bloating, diarrhea. Review of labs show that her lipid panel is within recommended range.         Current medicines (including changes today)  Current Outpatient Prescriptions   Medication Sig Dispense Refill   • fluticasone (FLONASE) 50 MCG/ACT nasal spray Spray 1 Spray in nose every day.     • fenofibrate (TRICOR) 145 MG Tab Take 1 Tab by mouth every day. 90 Tab 3   • Fluticasone-Salmeterol (ADVAIR HFA INH) Inhale  by mouth.     • fexofenadine (ALLEGRA) 60 MG TABS Take 60 mg by mouth every day.     • albuterol (VENTOLIN OR PROVENTIL) 108 (90 BASE) MCG/ACT AERS inhalation aerosol Inhale 1-2 Puffs by mouth every four hours as needed for Shortness of Breath.       No current facility-administered medications for this visit.      She  has a past medical history of Arthritis; ASTHMA; Breath shortness; and Hypercholesterolemia.    ROS   No chest pain, no shortness of breath, no abdominal pain  +sinus congestion     Objective:     Blood pressure 116/62, pulse 74, temperature 36.6 °C (97.8 °F), height 1.6 m (5' 3\"), weight 83 kg (183 lb), SpO2 95 %. Body mass index is 32.42 kg/m².   Physical Exam:  Alert, oriented in no acute distress.  Eye contact is good, speech goal directed, affect calm  HEENT: conjunctiva non-injected, sclera non-icteric.  Pinna normal. TM pearly gray.   Oral mucous membranes " pink and moist with no lesions.  Neck No adenopathy or masses in the neck or supraclavicular regions.  Lungs: clear to auscultation bilaterally with good excursion.  CV: regular rate and rhythm.  Abdomen: soft, nontender, No CVAT  Ext: no edema, color normal, vascularity normal, temperature normal        Assessment and Plan:   The following treatment plan was discussed     1. Mixed hyperlipidemia      Stable. Continue current medications; recheck labs in 6 months   2. Multiple allergies      Stable. Continue current medication; monitor       Followup: Return in about 6 months (around 7/17/2018) for lab review, Short.

## 2018-01-17 NOTE — ASSESSMENT & PLAN NOTE
Ongoing issue. Patient reports compliance with Allegra and Flonase. She states that she is not having any allergy symptoms but she is having some upper respiratory symptoms. She states that they are mild in nature and she is able to continue to work. She does report though that she is feeling some fatigue and tiredness.

## 2018-01-26 ENCOUNTER — TELEPHONE (OUTPATIENT)
Dept: MEDICAL GROUP | Facility: PHYSICIAN GROUP | Age: 68
End: 2018-01-26

## 2018-01-26 NOTE — TELEPHONE ENCOUNTER
MEDICATION PRIOR AUTHORIZATION NEEDED:    1. Name of Medication: fenofibrate (TRICOR) 145 MG Tab    2. Requested By (Name of Pharmacy): [facility]     3. Is insurance on file current? Yes    4. What is the name & phone number of the 3rd party payor?  [phone] Pt ID [id number]

## 2018-01-30 NOTE — TELEPHONE ENCOUNTER
DOCUMENTATION OF PAR STATUS:    1. Name of Medication & Dose: Tricor 145 mg     2. Name of Prescription Coverage Company & phone #: [phone]    3. Date Prior Auth Submitted: 1/30/18    4. What information was given to obtain insurance decision? Chart notes    5. Prior Auth Status? Pending    6. Patient Notified: NO

## 2018-02-16 ENCOUNTER — OFFICE VISIT (OUTPATIENT)
Dept: MEDICAL GROUP | Facility: PHYSICIAN GROUP | Age: 68
End: 2018-02-16
Payer: MEDICARE

## 2018-02-16 VITALS
HEIGHT: 63 IN | HEART RATE: 71 BPM | BODY MASS INDEX: 32.43 KG/M2 | SYSTOLIC BLOOD PRESSURE: 126 MMHG | OXYGEN SATURATION: 94 % | TEMPERATURE: 97.4 F | WEIGHT: 183 LBS | DIASTOLIC BLOOD PRESSURE: 84 MMHG

## 2018-02-16 DIAGNOSIS — E78.2 MIXED HYPERLIPIDEMIA: ICD-10-CM

## 2018-02-16 PROCEDURE — 99213 OFFICE O/P EST LOW 20 MIN: CPT | Performed by: FAMILY MEDICINE

## 2018-02-16 RX ORDER — GEMFIBROZIL 600 MG/1
600 TABLET, FILM COATED ORAL 2 TIMES DAILY
Qty: 180 TAB | Refills: 1 | Status: SHIPPED | OUTPATIENT
Start: 2018-02-16 | End: 2018-07-25

## 2018-02-16 NOTE — ASSESSMENT & PLAN NOTE
Ongoing issue. Patient reports that she has been stable on TriCor 145 mg daily. Unfortunately, when refill was submitted her insurance denied the refill. Patient reports that she is very nervous about taking an alternative medication since other ones in the past have made her feel very sick at her stomach. We have reviewed the records and her insurance has recommended that she has to trial gemfibrozil and feel until they will refill TriCor    Of note, patient's lipid panel has always been in a normal range and she has been on TriCor.

## 2018-02-16 NOTE — PROGRESS NOTES
"Subjective:   Greta Tran is a 67 y.o. female here today for elevated cholesterol    Mixed hyperlipidemia  Ongoing issue. Patient reports that she has been stable on TriCor 145 mg daily. Unfortunately, when refill was submitted her insurance denied the refill. Patient reports that she is very nervous about taking an alternative medication since other ones in the past have made her feel very sick at her stomach. We have reviewed the records and her insurance has recommended that she has to trial gemfibrozil and feel until they will refill TriCor    Of note, patient's lipid panel has always been in a normal range and she has been on TriCor.         Current medicines (including changes today)  Current Outpatient Prescriptions   Medication Sig Dispense Refill   • gemfibrozil (LOPID) 600 MG Tab Take 1 Tab by mouth 2 times a day. 180 Tab 1   • fluticasone (FLONASE) 50 MCG/ACT nasal spray Spray 1 Spray in nose every day.     • Fluticasone-Salmeterol (ADVAIR HFA INH) Inhale  by mouth.     • fexofenadine (ALLEGRA) 60 MG TABS Take 60 mg by mouth every day.     • albuterol (VENTOLIN OR PROVENTIL) 108 (90 BASE) MCG/ACT AERS inhalation aerosol Inhale 1-2 Puffs by mouth every four hours as needed for Shortness of Breath.       No current facility-administered medications for this visit.      She  has a past medical history of Arthritis; ASTHMA; Breath shortness; and Hypercholesterolemia.    ROS   No chest pain, no shortness of breath, no abdominal pain       Objective:     Blood pressure 126/84, pulse 71, temperature 36.3 °C (97.4 °F), height 1.6 m (5' 3\"), weight 83 kg (183 lb), SpO2 94 %. Body mass index is 32.42 kg/m².   Physical Exam:  Alert, oriented in no acute distress.  Eye contact is good, speech goal directed, affect calm  HEENT: conjunctiva non-injected, sclera non-icteric.  Pinna normal.Oral mucous membranes pink and moist with no lesions.  Neck No adenopathy or masses in the neck or supraclavicular " regions.  Lungs: clear to auscultation bilaterally with good excursion.  CV: regular rate and rhythm.  Abdomen: soft, nontender, No CVAT  Ext: no edema, color normal, vascularity normal, temperature normal        Assessment and Plan:   The following treatment plan was discussed     1. Mixed hyperlipidemia  COMP METABOLIC PANEL    LIPID PROFILE    Stable. Due to insurance issues, TriCor stopped; gemfibrozil 600 mg twice a day started; recheck labs in 4 months       Followup: Return if symptoms worsen or fail to improve.

## 2018-03-02 ENCOUNTER — TELEPHONE (OUTPATIENT)
Dept: MEDICAL GROUP | Facility: PHYSICIAN GROUP | Age: 68
End: 2018-03-02

## 2018-03-02 RX ORDER — FENOFIBRATE 145 MG/1
145 TABLET, COATED ORAL DAILY
Qty: 90 TAB | Refills: 3 | Status: SHIPPED | OUTPATIENT
Start: 2018-03-02 | End: 2019-02-25

## 2018-03-02 RX ORDER — FENOFIBRATE 145 MG/1
145 TABLET ORAL DAILY
Qty: 90 TAB | Refills: 3 | OUTPATIENT
Start: 2018-03-02

## 2018-03-02 NOTE — TELEPHONE ENCOUNTER
Greta has got her tricor 145 once a day BRAND NAME ONLY approved through her insurance for the year. We took this off of her med list because we couldn't get it approved. Is there any way you can add it back and send it in for a refill?

## 2018-03-12 ENCOUNTER — OFFICE VISIT (OUTPATIENT)
Dept: URGENT CARE | Facility: PHYSICIAN GROUP | Age: 68
End: 2018-03-12
Payer: MEDICARE

## 2018-03-12 ENCOUNTER — HOSPITAL ENCOUNTER (OUTPATIENT)
Dept: RADIOLOGY | Facility: MEDICAL CENTER | Age: 68
End: 2018-03-12
Attending: FAMILY MEDICINE
Payer: MEDICARE

## 2018-03-12 VITALS
SYSTOLIC BLOOD PRESSURE: 128 MMHG | RESPIRATION RATE: 16 BRPM | WEIGHT: 181 LBS | HEIGHT: 63 IN | BODY MASS INDEX: 32.07 KG/M2 | HEART RATE: 107 BPM | DIASTOLIC BLOOD PRESSURE: 76 MMHG | TEMPERATURE: 98.5 F | OXYGEN SATURATION: 92 %

## 2018-03-12 DIAGNOSIS — J18.9 COMMUNITY ACQUIRED PNEUMONIA OF RIGHT UPPER LOBE OF LUNG: ICD-10-CM

## 2018-03-12 DIAGNOSIS — R06.02 SOB (SHORTNESS OF BREATH): ICD-10-CM

## 2018-03-12 PROCEDURE — 71046 X-RAY EXAM CHEST 2 VIEWS: CPT

## 2018-03-12 PROCEDURE — 99214 OFFICE O/P EST MOD 30 MIN: CPT | Performed by: FAMILY MEDICINE

## 2018-03-12 RX ORDER — DOXYCYCLINE HYCLATE 100 MG
100 TABLET ORAL 2 TIMES DAILY
Qty: 20 TAB | Refills: 0 | Status: SHIPPED | OUTPATIENT
Start: 2018-03-12 | End: 2018-03-22

## 2018-03-12 NOTE — PATIENT INSTRUCTIONS

## 2018-03-12 NOTE — LETTER
March 12, 2018         Patient: Greta Tran   YOB: 1950   Date of Visit: 3/12/2018           To Whom it May Concern:    Greta Tran was seen in my clinic on 3/12/2018. She may return to work on 3/15/2018..    If you have any questions or concerns, please don't hesitate to call.        Sincerely,           Shay Fong M.D.  Electronically Signed

## 2018-03-14 ENCOUNTER — APPOINTMENT (OUTPATIENT)
Dept: URGENT CARE | Facility: PHYSICIAN GROUP | Age: 68
End: 2018-03-14
Payer: MEDICARE

## 2018-03-15 ENCOUNTER — HOSPITAL ENCOUNTER (OUTPATIENT)
Dept: RADIOLOGY | Facility: MEDICAL CENTER | Age: 68
End: 2018-03-15
Attending: PHYSICIAN ASSISTANT
Payer: MEDICARE

## 2018-03-15 ENCOUNTER — OFFICE VISIT (OUTPATIENT)
Dept: URGENT CARE | Facility: PHYSICIAN GROUP | Age: 68
End: 2018-03-15
Payer: MEDICARE

## 2018-03-15 VITALS
OXYGEN SATURATION: 92 % | HEIGHT: 63 IN | WEIGHT: 181 LBS | SYSTOLIC BLOOD PRESSURE: 152 MMHG | RESPIRATION RATE: 16 BRPM | DIASTOLIC BLOOD PRESSURE: 90 MMHG | TEMPERATURE: 98.1 F | BODY MASS INDEX: 32.07 KG/M2 | HEART RATE: 77 BPM

## 2018-03-15 DIAGNOSIS — J18.9 PNEUMONIA OF RIGHT UPPER LOBE DUE TO INFECTIOUS ORGANISM: ICD-10-CM

## 2018-03-15 PROCEDURE — 99214 OFFICE O/P EST MOD 30 MIN: CPT | Performed by: PHYSICIAN ASSISTANT

## 2018-03-15 PROCEDURE — 71046 X-RAY EXAM CHEST 2 VIEWS: CPT

## 2018-03-15 RX ORDER — AZITHROMYCIN 250 MG/1
TABLET, FILM COATED ORAL
Qty: 6 TAB | Refills: 0 | Status: SHIPPED | OUTPATIENT
Start: 2018-03-15 | End: 2018-07-25

## 2018-03-15 RX ORDER — BENZONATATE 100 MG/1
100-200 CAPSULE ORAL 3 TIMES DAILY PRN
Qty: 60 CAP | Refills: 0 | Status: SHIPPED | OUTPATIENT
Start: 2018-03-15 | End: 2018-07-25

## 2018-03-15 ASSESSMENT — ENCOUNTER SYMPTOMS
SHORTNESS OF BREATH: 0
DIARRHEA: 0
WHEEZING: 1
MUSCULOSKELETAL NEGATIVE: 1
HEARTBURN: 0
VOMITING: 0
NAUSEA: 0
FEVER: 0
CHILLS: 0
SPUTUM PRODUCTION: 1
HEMOPTYSIS: 0
SORE THROAT: 0
DIZZINESS: 0
ABDOMINAL PAIN: 0
COUGH: 1

## 2018-03-15 ASSESSMENT — COPD QUESTIONNAIRES: COPD: 0

## 2018-03-15 NOTE — PROGRESS NOTES
"Subjective:      Greta Tran is a 67 y.o. female who presents with Cough (dx with pneumonia 3/12/18 not improving)            Cough   This is a new problem. The current episode started in the past 7 days. The problem has been unchanged. The problem occurs every few minutes. The cough is non-productive. Associated symptoms include nasal congestion, postnasal drip and wheezing. Pertinent negatives include no chest pain, chills, ear pain, fever, heartburn, hemoptysis, sore throat or shortness of breath. Nothing aggravates the symptoms. Risk factors for lung disease include smoking/tobacco exposure. Treatments tried: antibiotics. The treatment provided mild relief. Her past medical history is significant for pneumonia. There is no history of asthma, bronchitis or COPD.     Patient was seen in urgent care 3 days ago and diagnosed with pneumonia of the right upper lobe. She was placed on doxycycline BID, which she has been taking as prescribed. She reports the cough is persistent and she believes the doxycycline is causing wheezing. No chest pain, SOB, fevers, or fatigue. She is UTD on all pneumococcal vaccinations.     Review of Systems   Constitutional: Negative for chills and fever.   HENT: Positive for congestion and postnasal drip. Negative for ear pain and sore throat.    Respiratory: Positive for cough, sputum production and wheezing. Negative for hemoptysis and shortness of breath.    Cardiovascular: Negative for chest pain.   Gastrointestinal: Negative for abdominal pain, diarrhea, heartburn, nausea and vomiting.   Genitourinary: Negative.    Musculoskeletal: Negative.    Neurological: Negative for dizziness.        Objective:     /90   Pulse 77   Temp 36.7 °C (98.1 °F)   Resp 16   Ht 1.6 m (5' 3\")   Wt 82.1 kg (181 lb)   SpO2 92%   BMI 32.06 kg/m²      Physical Exam   Constitutional: She is oriented to person, place, and time. She appears well-developed and well-nourished. No distress. "   HENT:   Head: Normocephalic and atraumatic.   Right Ear: Hearing, tympanic membrane, external ear and ear canal normal.   Left Ear: Hearing, tympanic membrane, external ear and ear canal normal.   Mouth/Throat: Oropharynx is clear and moist. No oropharyngeal exudate, posterior oropharyngeal edema or posterior oropharyngeal erythema.   Eyes: Conjunctivae are normal. Pupils are equal, round, and reactive to light. Right eye exhibits no discharge. Left eye exhibits no discharge.   Neck: Normal range of motion.   Cardiovascular: Normal rate, regular rhythm and normal heart sounds.    No murmur heard.  Pulmonary/Chest: Effort normal. No respiratory distress. She has wheezes. She has rales.   Musculoskeletal: Normal range of motion.   Lymphadenopathy:     She has no cervical adenopathy.   Neurological: She is alert and oriented to person, place, and time.   Skin: Skin is warm and dry. She is not diaphoretic.   Psychiatric: She has a normal mood and affect. Her behavior is normal.   Nursing note and vitals reviewed.         PMH:  has a past medical history of Arthritis; ASTHMA; Breath shortness; and Hypercholesterolemia.  MEDS:   Current Outpatient Prescriptions:   •  azithromycin (ZITHROMAX) 250 MG Tab, Take 2 tablets by mouth on day one, then 1 tablet by mouth on days two through five, Disp: 6 Tab, Rfl: 0  •  benzonatate (TESSALON) 100 MG Cap, Take 1-2 Caps by mouth 3 times a day as needed., Disp: 60 Cap, Rfl: 0  •  fenofibrate (TRICOR) 145 MG Tab, Take 1 Tab by mouth every day., Disp: 90 Tab, Rfl: 3  •  gemfibrozil (LOPID) 600 MG Tab, Take 1 Tab by mouth 2 times a day., Disp: 180 Tab, Rfl: 1  •  fluticasone (FLONASE) 50 MCG/ACT nasal spray, Spray 1 Spray in nose every day., Disp: , Rfl:   •  Fluticasone-Salmeterol (ADVAIR HFA INH), Inhale  by mouth., Disp: , Rfl:   •  fexofenadine (ALLEGRA) 60 MG TABS, Take 60 mg by mouth every day., Disp: , Rfl:   •  doxycycline (VIBRAMYCIN) 100 MG Tab, Take 1 Tab by mouth 2 times a  day for 10 days., Disp: 20 Tab, Rfl: 0  •  albuterol (VENTOLIN OR PROVENTIL) 108 (90 BASE) MCG/ACT AERS inhalation aerosol, Inhale 1-2 Puffs by mouth every four hours as needed for Shortness of Breath., Disp: , Rfl:   ALLERGIES:   Allergies   Allergen Reactions   • Cephalexin Nausea   • Food      Raspberry, carrots, green pepper, white potatoes, tomato, pork, turkey, tree nuts (cashew, hazelnut, walnut)   • Levaquin Swelling     Shaky, throat swelling, joint pain     SURGHX:   Past Surgical History:   Procedure Laterality Date   • JOSELITO BY LAPAROSCOPY N/A 6/19/2015    Procedure: JOSELITO BY LAPAROSCOPY;  Surgeon: Grace Montgomery M.D.;  Location: SURGERY [facility];  Service:    • GYN SURGERY  1993    hysterectomy   • ABDOMINAL HYSTERECTOMY TOTAL     • NASAL POLYPECTOMY       SOCHX:  reports that she quit smoking about 17 years ago. Her smoking use included Cigarettes. She has a 25.00 pack-year smoking history. She has never used smokeless tobacco. She reports that she drinks alcohol. She reports that she does not use drugs.  FH: family history includes Cancer in her mother and sister; Heart Disease in her brother; Hypertension in her father; Stroke in her father.       Assessment/Plan:     1. Pneumonia of right upper lobe due to infectious organism (CMS-[facility])  - DX-CHEST-2 VIEWS; Future    1.  Subtle opacifications in right upper lobe seen on prior radiograph have decreased since prior exam.    2.  No new infiltrates or consolidations are identified.       - azithromycin (ZITHROMAX) 250 MG Tab; Take 2 tablets by mouth on day one, then 1 tablet by mouth on days two through five  Dispense: 6 Tab; Refill: 0   - Complete full course of antibiotics as prescribed   - benzonatate (TESSALON) 100 MG Cap; Take 1-2 Caps by mouth 3 times a day as needed.  Dispense: 60 Cap; Refill: 0    Discussed x-ray results with patient. She would like to change antibiotics due to possible side effect of wheezing. Z-lc given at today's  visit. Encouraged to follow up with her PCP if symptoms persist after completing course of antibiotics. The patient demonstrated a good understanding and agreed with the treatment plan.

## 2018-03-25 ASSESSMENT — ENCOUNTER SYMPTOMS
COUGH: 1
FEVER: 1
CHILLS: 1
SHORTNESS OF BREATH: 1

## 2018-03-26 NOTE — PROGRESS NOTES
"Subjective:   Greta Tran is a 67 y.o. female who presents for Cough (x1days productive cough, headache)        Cough   This is a new problem. The current episode started yesterday. The problem has been rapidly worsening. The problem occurs every few minutes. The cough is productive of sputum. Associated symptoms include chills, a fever and shortness of breath. Pertinent negatives include no nasal congestion or postnasal drip.     Review of Systems   Constitutional: Positive for chills and fever.   HENT: Negative for postnasal drip.    Respiratory: Positive for cough and shortness of breath.      Allergies   Allergen Reactions   • Cephalexin Nausea   • Food      Raspberry, carrots, green pepper, white potatoes, tomato, pork, turkey, tree nuts (cashew, hazelnut, walnut)   • Levaquin Swelling     Shaky, throat swelling, joint pain      Objective:   /76   Pulse (!) 107   Temp 36.9 °C (98.5 °F)   Resp 16   Ht 1.6 m (5' 3\")   Wt 82.1 kg (181 lb)   SpO2 92%   BMI 32.06 kg/m²   Physical Exam   Constitutional: She is oriented to person, place, and time. She appears well-developed and well-nourished. No distress.   HENT:   Head: Normocephalic and atraumatic.   Eyes: Conjunctivae and EOM are normal. Pupils are equal, round, and reactive to light.   Cardiovascular: Normal rate and regular rhythm.    No murmur heard.  Pulmonary/Chest: Effort normal. No respiratory distress. She has wheezes. She has rales.   Abdominal: Soft. She exhibits no distension. There is no tenderness.   Neurological: She is alert and oriented to person, place, and time. She has normal reflexes. No sensory deficit.   Skin: Skin is warm and dry.   Psychiatric: She has a normal mood and affect.         Assessment/Plan:   Assessment    1. Community acquired pneumonia of right upper lobe of lung (CMS-HCC)  - DX-CHEST-2 VIEWS; Future  - doxycycline (VIBRAMYCIN) 100 MG Tab; Take 1 Tab by mouth 2 times a day for 10 days.  Dispense: 20 Tab; " Refill: 0  Differential diagnosis, natural history, supportive care, and indications for immediate follow-up discussed.

## 2018-06-19 DIAGNOSIS — Z82.49 FAMILY HISTORY OF BLOOD CLOTS: ICD-10-CM

## 2018-06-26 ENCOUNTER — HOSPITAL ENCOUNTER (OUTPATIENT)
Dept: LAB | Facility: MEDICAL CENTER | Age: 68
End: 2018-06-26
Attending: FAMILY MEDICINE
Payer: MEDICARE

## 2018-06-26 DIAGNOSIS — Z82.49 FAMILY HISTORY OF BLOOD CLOTS: ICD-10-CM

## 2018-06-26 DIAGNOSIS — E78.2 MIXED HYPERLIPIDEMIA: ICD-10-CM

## 2018-06-26 LAB
ALBUMIN SERPL BCP-MCNC: 4.2 G/DL (ref 3.2–4.9)
ALBUMIN/GLOB SERPL: 1.1 G/DL
ALP SERPL-CCNC: 40 U/L (ref 30–99)
ALT SERPL-CCNC: 17 U/L (ref 2–50)
ANION GAP SERPL CALC-SCNC: 6 MMOL/L (ref 0–11.9)
AST SERPL-CCNC: 18 U/L (ref 12–45)
BILIRUB SERPL-MCNC: 0.4 MG/DL (ref 0.1–1.5)
BUN SERPL-MCNC: 24 MG/DL (ref 8–22)
CALCIUM SERPL-MCNC: 9.5 MG/DL (ref 8.5–10.5)
CHLORIDE SERPL-SCNC: 105 MMOL/L (ref 96–112)
CHOLEST SERPL-MCNC: 134 MG/DL (ref 100–199)
CO2 SERPL-SCNC: 28 MMOL/L (ref 20–33)
CREAT SERPL-MCNC: 0.85 MG/DL (ref 0.5–1.4)
GLOBULIN SER CALC-MCNC: 3.7 G/DL (ref 1.9–3.5)
GLUCOSE SERPL-MCNC: 90 MG/DL (ref 65–99)
HDLC SERPL-MCNC: 43 MG/DL
LDLC SERPL CALC-MCNC: 72 MG/DL
POTASSIUM SERPL-SCNC: 4.7 MMOL/L (ref 3.6–5.5)
PROT SERPL-MCNC: 7.9 G/DL (ref 6–8.2)
SODIUM SERPL-SCNC: 139 MMOL/L (ref 135–145)
TRIGL SERPL-MCNC: 96 MG/DL (ref 0–149)

## 2018-06-26 PROCEDURE — 36415 COLL VENOUS BLD VENIPUNCTURE: CPT

## 2018-06-26 PROCEDURE — 81241 F5 GENE: CPT

## 2018-06-26 PROCEDURE — 80053 COMPREHEN METABOLIC PANEL: CPT

## 2018-06-26 PROCEDURE — 80061 LIPID PANEL: CPT

## 2018-06-29 LAB — F5 P.R506Q BLD/T QL: ABNORMAL

## 2018-07-17 ENCOUNTER — APPOINTMENT (OUTPATIENT)
Dept: MEDICAL GROUP | Facility: PHYSICIAN GROUP | Age: 68
End: 2018-07-17
Payer: MEDICARE

## 2018-07-20 ENCOUNTER — OFFICE VISIT (OUTPATIENT)
Dept: URGENT CARE | Facility: PHYSICIAN GROUP | Age: 68
End: 2018-07-20
Payer: MEDICARE

## 2018-07-20 ENCOUNTER — HOSPITAL ENCOUNTER (OUTPATIENT)
Dept: RADIOLOGY | Facility: MEDICAL CENTER | Age: 68
End: 2018-07-20
Attending: FAMILY MEDICINE
Payer: MEDICARE

## 2018-07-20 VITALS
OXYGEN SATURATION: 95 % | DIASTOLIC BLOOD PRESSURE: 88 MMHG | WEIGHT: 180 LBS | HEART RATE: 89 BPM | SYSTOLIC BLOOD PRESSURE: 138 MMHG | HEIGHT: 63 IN | TEMPERATURE: 98.2 F | BODY MASS INDEX: 31.89 KG/M2 | RESPIRATION RATE: 15 BRPM

## 2018-07-20 DIAGNOSIS — M25.511 ACUTE PAIN OF RIGHT SHOULDER: ICD-10-CM

## 2018-07-20 DIAGNOSIS — M54.9 UPPER BACK PAIN ON RIGHT SIDE: ICD-10-CM

## 2018-07-20 DIAGNOSIS — M65.20 CALCIFIC TENDINITIS: ICD-10-CM

## 2018-07-20 PROCEDURE — 99213 OFFICE O/P EST LOW 20 MIN: CPT | Performed by: FAMILY MEDICINE

## 2018-07-20 PROCEDURE — 73030 X-RAY EXAM OF SHOULDER: CPT | Mod: RT

## 2018-07-20 ASSESSMENT — ENCOUNTER SYMPTOMS
FOCAL WEAKNESS: 0
WEIGHT LOSS: 0
FLANK PAIN: 0
SENSORY CHANGE: 0
FEVER: 0

## 2018-07-20 ASSESSMENT — PAIN SCALES - GENERAL: PAINLEVEL: 8=MODERATE-SEVERE PAIN

## 2018-07-23 ENCOUNTER — OFFICE VISIT (OUTPATIENT)
Dept: MEDICAL GROUP | Facility: CLINIC | Age: 68
End: 2018-07-23
Payer: MEDICARE

## 2018-07-23 VITALS
SYSTOLIC BLOOD PRESSURE: 122 MMHG | HEIGHT: 63 IN | BODY MASS INDEX: 31.89 KG/M2 | WEIGHT: 180 LBS | TEMPERATURE: 97.8 F | DIASTOLIC BLOOD PRESSURE: 82 MMHG | RESPIRATION RATE: 18 BRPM | HEART RATE: 88 BPM | OXYGEN SATURATION: 94 %

## 2018-07-23 DIAGNOSIS — M75.31 CALCIFIC TENDINITIS OF RIGHT SHOULDER: ICD-10-CM

## 2018-07-23 DIAGNOSIS — M54.2 CERVICALGIA: ICD-10-CM

## 2018-07-23 PROCEDURE — 20610 DRAIN/INJ JOINT/BURSA W/O US: CPT | Mod: RT | Performed by: FAMILY MEDICINE

## 2018-07-23 PROCEDURE — 99203 OFFICE O/P NEW LOW 30 MIN: CPT | Mod: 25 | Performed by: FAMILY MEDICINE

## 2018-07-23 RX ORDER — TRIAMCINOLONE ACETONIDE 40 MG/ML
40 INJECTION, SUSPENSION INTRA-ARTICULAR; INTRAMUSCULAR ONCE
Status: COMPLETED | OUTPATIENT
Start: 2018-07-23 | End: 2018-07-23

## 2018-07-23 RX ADMIN — TRIAMCINOLONE ACETONIDE 40 MG: 40 INJECTION, SUSPENSION INTRA-ARTICULAR; INTRAMUSCULAR at 13:23

## 2018-07-23 NOTE — PROGRESS NOTES
CHIEF COMPLAINT:  Greta Tran female presenting at the request of Wilver Stark M.D. for evaluation of Shoulderpain.     Greta Tran is complaining of right shoulder pain  Symptoms began approximately 3 weeks ago, insidious, early July 2018  No specific injury, has been doing some yard work at home  Pain is at the deltoid region and eloisa-scapular  Quality is aching, sharp, pressure  Pain is Non-radiating  Aggravated by movement and overhead activity  Improved with  rest   no prior problems with this shoulder in the past  Prior Treatments: seen at   Prior studies: X-Ray   Medications tried for pain include: acetaminophen, helps some.  Ibuprofen helps, but her stomach gets upset.  Mechanical Symptom history: No Locking    REVIEW OF SYSTEMS  No Vomiting, No Chest Pain, No Shortness of Breath, No Dizziness, No Headache, Nausea    PAST MEDICAL HISTORY:   History reviewed. No pertinent past medical history.    PMH:  has a past medical history of Arthritis; ASTHMA; Breath shortness; and Hypercholesterolemia.  MEDS:   Current Outpatient Prescriptions:   •  azithromycin (ZITHROMAX) 250 MG Tab, Take 2 tablets by mouth on day one, then 1 tablet by mouth on days two through five, Disp: 6 Tab, Rfl: 0  •  benzonatate (TESSALON) 100 MG Cap, Take 1-2 Caps by mouth 3 times a day as needed., Disp: 60 Cap, Rfl: 0  •  fenofibrate (TRICOR) 145 MG Tab, Take 1 Tab by mouth every day., Disp: 90 Tab, Rfl: 3  •  gemfibrozil (LOPID) 600 MG Tab, Take 1 Tab by mouth 2 times a day., Disp: 180 Tab, Rfl: 1  •  fluticasone (FLONASE) 50 MCG/ACT nasal spray, Spray 1 Spray in nose every day., Disp: , Rfl:   •  Fluticasone-Salmeterol (ADVAIR HFA INH), Inhale  by mouth., Disp: , Rfl:   •  albuterol (VENTOLIN OR PROVENTIL) 108 (90 BASE) MCG/ACT AERS inhalation aerosol, Inhale 1-2 Puffs by mouth every four hours as needed for Shortness of Breath., Disp: , Rfl:   •  fexofenadine (ALLEGRA) 60 MG TABS, Take 60 mg by mouth every day.,  "Disp: , Rfl:   ALLERGIES:   Allergies   Allergen Reactions   • Cephalexin Nausea   • Food      Raspberry, carrots, green pepper, white potatoes, tomato, pork, turkey, tree nuts (cashew, hazelnut, walnut)   • Levaquin Swelling     Shaky, throat swelling, joint pain     SURGHX:   Past Surgical History:   Procedure Laterality Date   • JOSELITO BY LAPAROSCOPY N/A 6/19/2015    Procedure: JOSELITO BY LAPAROSCOPY;  Surgeon: Grace Montgomery M.D.;  Location: SURGERY [facility];  Service:    • GYN SURGERY  1993    hysterectomy   • ABDOMINAL HYSTERECTOMY TOTAL     • NASAL POLYPECTOMY       SOCHX:  reports that she quit smoking about 18 years ago. Her smoking use included Cigarettes. She has a 25.00 pack-year smoking history. She has never used smokeless tobacco. She reports that she drinks alcohol. She reports that she does not use drugs.  FH: Family history was reviewed, no pertinent findings to report     PHYSICAL EXAM:  /82   Pulse 88   Temp 36.6 °C (97.8 °F)   Resp 18   Ht 1.6 m (5' 3\")   Wt 81.6 kg (180 lb)   SpO2 94%   BMI 31.89 kg/m²      well-developed, well-nourished in no apparent distress, alert and oriented x 3.  Gait: normal    Cervical spine:  Range of motion Slightly limited with Lateral rotation  Spurling's testing is POSITIVE with pain radiating into the shoulder and eloisa-scapular region on RIGHT   Cervical spine tenderness NEGATIVE    Strength testing:     Deltoid, bilateral 5/5  Bicep, bilateral 5/5  Tricep, bilateral 5/5  Wrist Extension, bilateral 5/5  Wrist Flexion, bilateral 5/5  Finger Abduction, bilateral 5/5    Sensation:  INTACT Bilaterally    Reflexes:   Biceps: R 2+/L 2+  Triceps: R 2+/L  2+  Brachial radialis R 2+/L  2+  Grimaldo's testing is NEGATIVE  The arms are otherwise neurovascularly intact     Shoulder Exam:    RIGHT Shoulder:  No visible swelling   Range of motion INTACT  Tenderness: Non-tender  Empty Can Testing 5/5  Internal Rotation 5/5  External Rotation 5/5  Lift Off " Testing 5/5  Impingement testing Mirza  POSITIVE  Neer's testing POSITIVE  Apprehension testing NEGATIVE  Relocation testing NEGATIVE  Dickson's Testing NEGATIVE  Grind Testing NEGATIVE      LEFT Shoulder:  No visible swelling   Range of motion INTACT  Tenderness: Non-tender  Empty Can Testing 5/5  Internal Rotation 5/5  External Rotation 5/5  Lift Off Testing 5/5  Impingement testing Mirza  NEGATIVE  Neer's testing NEGATIVE  Apprehension testing NEGATIVE  Relocation testing NEGATIVE  Dickson's Testing NEGATIVE  Grind Testing NEGATIVE      Additional Findings: None      1. Calcific tendinitis of right shoulder  triamcinolone acetonide (KENALOG-40) injection 40 mg    REFERRAL TO PHYSICAL THERAPY Reason for Therapy: Eval/Treat/Report   2. Cervicalgia       Corticosteroid injection the RIGHT subacromial bursa performed in the office TODAY (July 23, 2018)  We will see her back in 1 month  If symptoms are not improved at that time, consider cervical spine workup given her cervical spine findings    Referral for formal physical therapy    PROCEDURE NOTE:  right Shoulder subacromial injection  Risks and benefits discussed  Informed consent obtained  Shoulder prepped in sterile fashion utilizing a posterior approach  40 mg of Kenalog and 5 cc of Marcaine injected into the subacromial space  Vapocoolant spray was utilized  Patient tolerated the procedure well  Postprocedure care and red flags discussed    Return in about 4 weeks (around 8/20/2018).  See how she is doing with formal physical therapy and after her RIGHT subacromial corticosteroid injection            7/20/2018 12:36 PM    HISTORY/REASON FOR EXAM:  Atraumatic Pain/Swelling/Deformity.      TECHNIQUE/EXAM DESCRIPTION AND NUMBER OF VIEWS:  3 views of the RIGHT shoulder.    COMPARISON: None    FINDINGS:  The bony structures are demineralized, however normally aligned. Minimal spurring is present about the inferior glenoid. Calcifications adjacent to the humeral  head are suggestive of calcific tendinitis.   Impression       No radiographic evidence of acute traumatic bone injury.    Marked bony demineralization.    Findings suggest calcific tendinitis.     done elsewhere and reviewed independently by me    Thank you Wilver Stark M.D. for allowing me to participate in caring for your patient.

## 2018-07-25 ENCOUNTER — OFFICE VISIT (OUTPATIENT)
Dept: MEDICAL GROUP | Facility: PHYSICIAN GROUP | Age: 68
End: 2018-07-25
Payer: MEDICARE

## 2018-07-25 VITALS
HEART RATE: 81 BPM | HEIGHT: 63 IN | OXYGEN SATURATION: 94 % | DIASTOLIC BLOOD PRESSURE: 74 MMHG | BODY MASS INDEX: 31.89 KG/M2 | TEMPERATURE: 97.8 F | WEIGHT: 180 LBS | SYSTOLIC BLOOD PRESSURE: 114 MMHG

## 2018-07-25 DIAGNOSIS — E78.2 MIXED HYPERLIPIDEMIA: ICD-10-CM

## 2018-07-25 DIAGNOSIS — J45.20 MILD INTERMITTENT ASTHMA WITHOUT COMPLICATION: ICD-10-CM

## 2018-07-25 DIAGNOSIS — Z88.9 MULTIPLE ALLERGIES: ICD-10-CM

## 2018-07-25 PROBLEM — E66.9 OBESITY (BMI 30-39.9): Status: RESOLVED | Noted: 2017-01-23 | Resolved: 2018-07-25

## 2018-07-25 PROCEDURE — 99214 OFFICE O/P EST MOD 30 MIN: CPT | Performed by: FAMILY MEDICINE

## 2018-07-25 NOTE — ASSESSMENT & PLAN NOTE
Ongoing issue; patient currently compliant with Allegra and Flonase for symptom management.  She currently is denying any issues with postnasal drip, sinus pressure or congestion.

## 2018-07-25 NOTE — PROGRESS NOTES
"Subjective:   Greta Tran is a 68 y.o. female here today for asthma, allergies, hyperlipidemia    Mild intermittent asthma without complication  Ongoing issues; patient currently compliant with Advair; currently denies any cough, wheezing, shortness of breath.  Patient reports that her allergist did recently increase the dosage and this has been significantly beneficial.    Multiple allergies  Ongoing issue; patient currently compliant with Allegra and Flonase for symptom management.  She currently is denying any issues with postnasal drip, sinus pressure or congestion.    Mixed hyperlipidemia  Ongoing issues; recent labs have been reviewed in detail with the patient today which shows a normal lipid panel along with a normal liver function.  Patient is compliant with fenofibrate 145 mg daily and currently denies any side effects with medication.         Current medicines (including changes today)  Current Outpatient Prescriptions   Medication Sig Dispense Refill   • fenofibrate (TRICOR) 145 MG Tab Take 1 Tab by mouth every day. 90 Tab 3   • fluticasone (FLONASE) 50 MCG/ACT nasal spray Spray 1 Spray in nose every day.     • Fluticasone-Salmeterol (ADVAIR HFA INH) Inhale  by mouth.     • fexofenadine (ALLEGRA) 60 MG TABS Take 60 mg by mouth every day.     • albuterol (VENTOLIN OR PROVENTIL) 108 (90 BASE) MCG/ACT AERS inhalation aerosol Inhale 1-2 Puffs by mouth every four hours as needed for Shortness of Breath.       No current facility-administered medications for this visit.      She  has a past medical history of Arthritis; ASTHMA; Breath shortness; and Hypercholesterolemia.    ROS   No chest pain, no shortness of breath, no abdominal pain       Objective:     Blood pressure 114/74, pulse 81, temperature 36.6 °C (97.8 °F), height 1.6 m (5' 3\"), weight 81.6 kg (180 lb), SpO2 94 %. Body mass index is 31.89 kg/m².   Physical Exam:  Alert, oriented in no acute distress.  Eye contact is good, speech goal " directed, affect calm  HEENT: conjunctiva non-injected, sclera non-icteric.  Pinna normal. TM pearly gray.   Oral mucous membranes pink and moist with no lesions.  Neck No adenopathy or masses in the neck or supraclavicular regions.  Lungs: clear to auscultation bilaterally with good excursion.  CV: regular rate and rhythm.  Abdomen: soft, nontender, No CVAT  Ext: no edema, color normal, vascularity normal, temperature normal        Assessment and Plan:   The following treatment plan was discussed   1. Mixed hyperlipidemia      Stable.  Continue current medication; recheck labs in 1 year   2. Mild intermittent asthma without complication      Stable.  Continue current medication; continue follow-up with allergist as recommended   3. Multiple allergies      Stable.  Continue current medication; continue follow-up with allergist as recommended     Review health maintenance at next appointment  Followup: Return in about 6 months (around 1/25/2019) for medication review, Short.

## 2018-07-25 NOTE — ASSESSMENT & PLAN NOTE
Ongoing issues; patient currently compliant with Advair; currently denies any cough, wheezing, shortness of breath.  Patient reports that her allergist did recently increase the dosage and this has been significantly beneficial.

## 2018-07-25 NOTE — ASSESSMENT & PLAN NOTE
Ongoing issues; recent labs have been reviewed in detail with the patient today which shows a normal lipid panel along with a normal liver function.  Patient is compliant with fenofibrate 145 mg daily and currently denies any side effects with medication.

## 2018-08-13 ENCOUNTER — PHYSICAL THERAPY (OUTPATIENT)
Dept: PHYSICAL THERAPY | Facility: REHABILITATION | Age: 68
End: 2018-08-13
Attending: FAMILY MEDICINE
Payer: MEDICARE

## 2018-08-13 DIAGNOSIS — M25.511 RIGHT SHOULDER PAIN, UNSPECIFIED CHRONICITY: ICD-10-CM

## 2018-08-13 DIAGNOSIS — M75.31 CALCIFIC TENDINITIS OF RIGHT SHOULDER: ICD-10-CM

## 2018-08-13 PROCEDURE — G8978 MOBILITY CURRENT STATUS: HCPCS | Mod: CI

## 2018-08-13 PROCEDURE — 97014 ELECTRIC STIMULATION THERAPY: CPT

## 2018-08-13 PROCEDURE — 97162 PT EVAL MOD COMPLEX 30 MIN: CPT

## 2018-08-13 PROCEDURE — G8979 MOBILITY GOAL STATUS: HCPCS | Mod: CH

## 2018-08-13 ASSESSMENT — ENCOUNTER SYMPTOMS
PAIN TIMING: INTERMITTENT
PAIN SCALE: 2
PAIN TIMING: IN THE EVENING
PAIN SCALE AT LOWEST: 0
QUALITY: ACHING
PAIN SCALE AT HIGHEST: 5

## 2018-08-13 NOTE — OP THERAPY EVALUATION
Outpatient Physical Therapy  INITIAL EVALUATION    [facility] Physical Therapy [address]  [address]  [place] [address]  Phone:  [phone]  Fax:  [phone]    Date of Evaluation: 2018    Patient: Greta Tran  YOB: 1950  MRN: [id number]     Referring Provider: Grant Tobias M.D.  [address] [place] [address] [place] [address]   Referring Diagnosis Calcific tendinitis of right shoulder [M75.31]     Time Calculation  Start time: 1500  Stop time: 1600 Time Calculation (min): 60 minutes     Physical Therapy Occurrence Codes    Date physical therapy care plan established or reviewed:  18   Date physical therapy treatment started:  18          Chief Complaint: Right shoulder pain  Visit Diagnoses     ICD-10-CM   1. Right shoulder pain, unspecified chronicity M25.511   2. Calcific tendinitis of right shoulder M75.31         Subjective:   History of Present Illness:     Date of onset:  2018    Mechanism of injury:  Onset of Right shoulder pain 2018, x-ray: calcium deposits in Right shoulder. Injection right GH joint approximately 3 weeks ago.  Symptoms are improving with time and are intermittent dull ache.  No paresthesia, no cervical pain, no past injuries to right shoulder.  Pain is currently right shoulder blade.   X ray:Findings suggest calcific tendinitis right shoulder  Not limited by pain during work or ADLS  Prior level of function:  Painfree, unlimited use of right shoulder.  Headaches:  no headaches  Sleep disturbance:  Not disrupted  Pain:     Current pain ratin    At best pain ratin    At worst pain ratin    Quality:  Aching    Pain timing:  Intermittent and in the evening    Alleviating factors: icy hot topical pain reliever, Tylenol     Exacerbated by: when tired.    Progression:  Improving      Past Medical History:   Diagnosis Date   • Arthritis    • ASTHMA    • Breath shortness     with exertion   • Hypercholesterolemia       Past Surgical History:   Procedure Laterality Date   • JOSELITO BY LAPAROSCOPY N/A 6/19/2015    Procedure: JOSELITO BY LAPAROSCOPY;  Surgeon: Grace Montgomery M.D.;  Location: SURGERY [facility];  Service:    • GYN SURGERY  1993    hysterectomy   • ABDOMINAL HYSTERECTOMY TOTAL     • NASAL POLYPECTOMY       Social History   Substance Use Topics   • Smoking status: Former Smoker     Packs/day: 1.00     Years: 25.00     Types: Cigarettes     Quit date: 5/27/2000   • Smokeless tobacco: Never Used   • Alcohol use Yes      Comment: holidays     Family and Occupational History     Social History   • Marital status:      Spouse name: N/A   • Number of children: N/A   • Years of education: N/A       Objective     Postural Observations  Seated posture: fair  Correction of posture: makes symptoms better        Cervical Screen    Cervical range of motion within normal limits  Cervical range of motion within normal limits with the following exceptions:     Thoracic Screen    Thoracic range of motion within normal limits    Palpation     Right   Tenderness of the biceps and supraspinatus.     Tenderness     Right Shoulder  Tenderness in the AC joint, acromion, bicipital groove and supraspinatus tendon.     Active Range of Motion   Left Shoulder   Normal active range of motion  Internal rotation BTB: T11     Right Shoulder   Flexion: 150 degrees   Abduction: 150 degrees   External rotation 0°: 70 degrees   Internal rotation BTB: T11     Passive Range of Motion   Left Shoulder   Normal passive range of motion    Right Shoulder   Normal passive range of motion    Joint Play     Right Shoulder     Posterior capsule: hypomobile    Inferior capsule: hypomobile    AC joint: hypomobile        Therapeutic Exercises (CPT 10066):     1. [place] pink tband, 2 x 30sec    2. Wall slide with hold right , flex/abduction    3. Arom standing against wall    Therapeutic Treatments and Modalities:     1. E Stim Unattended (CPT 67768),  French right supra,infraspiantus, ant deltoid with MHP x 15 min    Therapeutic Treatment and Modalities Summary: [place]    Time-based treatments/modalities:  Therapeutic exercise minutes (CPT 07523): 10 minutes       Assessment, Response and Plan:   Assessment details:  Patient presents with symptoms consistent with calcific tendonitis with tenderness right AC joint, supraspinatus and biceps tendon with palpation.  Symptoms are improving since receiving injection 3 weeks ago but continue to be aggravated with work releated activities; especially at the end of the day.  Patient presents with impaired right GH joint AROM,and hypomobility Right GH joint.  Patient will benefit from skilled PT to meet goals stated below.  Barriers to therapy:  None  Prognosis: good    Goals:   Short Term Goals:   50% decrease in symptoms during work related movement   Full AROM Right GH joint symptom free  Short term goal time span:  2-4 weeks      Long Term Goals:    Independent with home program  Symptom free with all ADLS and work  Long term goal time span:  4-6 weeks    Plan:   Therapy options:  Physical therapy treatment to continue  Planned therapy interventions:  E Stim Unattended (CPT 80192), Manual Therapy (CPT 93778), Neuromuscular Re-education (CPT 23890) and Therapeutic Exercise (CPT 88748)  Frequency:  1x week  Duration in weeks:  8  Duration in visits:  10  Discussed with:  Patient      Functional Limitation G-Codes and Severity Modifiers  PT Functional Assessment Tool Used: quick Dash  PT Functional Assessment Score: 2.2  Quickdash General Total Score: 2.27   Current: Mobility: Current (): CI   Goal: Mobility: Goal (): CH     Referring provider co-signature:  I have reviewed this plan of care and my co-signature certifies the need for services.  Certification Dates:   From 8/13/2018    To     Physician Signature: ________________________________ Date: ______________

## 2018-08-20 ENCOUNTER — OFFICE VISIT (OUTPATIENT)
Dept: MEDICAL GROUP | Facility: CLINIC | Age: 68
End: 2018-08-20
Payer: MEDICARE

## 2018-08-20 VITALS
HEART RATE: 88 BPM | DIASTOLIC BLOOD PRESSURE: 80 MMHG | TEMPERATURE: 98.6 F | RESPIRATION RATE: 18 BRPM | WEIGHT: 180 LBS | BODY MASS INDEX: 31.89 KG/M2 | SYSTOLIC BLOOD PRESSURE: 122 MMHG | OXYGEN SATURATION: 94 % | HEIGHT: 63 IN

## 2018-08-20 DIAGNOSIS — M75.31 CALCIFIC TENDINITIS OF RIGHT SHOULDER: ICD-10-CM

## 2018-08-20 PROCEDURE — 99213 OFFICE O/P EST LOW 20 MIN: CPT | Performed by: FAMILY MEDICINE

## 2018-08-21 ENCOUNTER — APPOINTMENT (OUTPATIENT)
Dept: PHYSICAL THERAPY | Facility: REHABILITATION | Age: 68
End: 2018-08-21
Attending: FAMILY MEDICINE
Payer: MEDICARE

## 2018-08-21 NOTE — PROGRESS NOTES
CHIEF COMPLAINT:  Greta Tran female presenting at the request of Wilver Stark M.D. for evaluation of Shoulderpain.     Greta Tran is complaining of right shoulder pain  Symptoms began approximately 3 weeks ago, insidious, early July 2018  No specific injury, has been doing some yard work at home  Pain is at the deltoid region and eloisa-scapular  Quality is aching, sharp, pressure  80% improved since shoulder injection    REVIEW OF SYSTEMS  No Vomiting, No Chest Pain, No Shortness of Breath, No Dizziness, No Headache, Nausea    PAST MEDICAL HISTORY:   History reviewed. No pertinent past medical history.    PMH:  has a past medical history of Arthritis; ASTHMA; Breath shortness; and Hypercholesterolemia.  MEDS:   Current Outpatient Prescriptions:   •  fenofibrate (TRICOR) 145 MG Tab, Take 1 Tab by mouth every day., Disp: 90 Tab, Rfl: 3  •  fluticasone (FLONASE) 50 MCG/ACT nasal spray, Spray 1 Spray in nose every day., Disp: , Rfl:   •  Fluticasone-Salmeterol (ADVAIR HFA INH), Inhale  by mouth., Disp: , Rfl:   •  albuterol (VENTOLIN OR PROVENTIL) 108 (90 BASE) MCG/ACT AERS inhalation aerosol, Inhale 1-2 Puffs by mouth every four hours as needed for Shortness of Breath., Disp: , Rfl:   •  fexofenadine (ALLEGRA) 60 MG TABS, Take 60 mg by mouth every day., Disp: , Rfl:   ALLERGIES:   Allergies   Allergen Reactions   • Cephalexin Nausea   • Food      Raspberry, carrots, green pepper, white potatoes, tomato, pork, turkey, tree nuts (cashew, hazelnut, walnut)   • Levaquin Swelling     Shaky, throat swelling, joint pain     SURGHX:   Past Surgical History:   Procedure Laterality Date   • JOSELITO BY LAPAROSCOPY N/A 6/19/2015    Procedure: JOSELITO BY LAPAROSCOPY;  Surgeon: Grace Montgomery M.D.;  Location: SURGERY [facility];  Service:    • GYN SURGERY  1993    hysterectomy   • ABDOMINAL HYSTERECTOMY TOTAL     • NASAL POLYPECTOMY       SOCHX:  reports that she quit smoking about 18 years ago. Her smoking  "use included Cigarettes. She has a 25.00 pack-year smoking history. She has never used smokeless tobacco. She reports that she drinks alcohol. She reports that she does not use drugs.  FH: Family history was reviewed, no pertinent findings to report     PHYSICAL EXAM:  /80   Pulse 88   Temp 37 °C (98.6 °F)   Resp 18   Ht 1.6 m (5' 3\")   Wt 81.6 kg (180 lb)   SpO2 94%   BMI 31.89 kg/m²      well-developed, well-nourished in no apparent distress, alert and oriented x 3.  Gait: normal    RIGHT Shoulder:  No visible swelling   Range of motion INTACT  Tenderness: Non-tender  Empty Can Testing 5/5  Internal Rotation 5/5  External Rotation 5/5  Lift Off Testing 5/5  Impingement testing Mirza  NEGATIVE  Neer's testing NEGATIVE    LEFT Shoulder:  No visible swelling   Range of motion INTACT  Tenderness: Non-tender  Empty Can Testing 5/5  Internal Rotation 5/5  External Rotation 5/5  Lift Off Testing 5/5  Impingement testing Mirza  NEGATIVE  Neer's testing NEGATIVE      Additional Findings: None      1. Calcific tendinitis of right shoulder        80% improvement   Corticosteroid injection the RIGHT subacromial bursa performed (July 23, 2018)    We will see her back in 1 month  If symptoms persist at that time, consider cervical spine workup given her prior cervical spine findings    Continue formal physical therapy    No Follow-up on file.  If symptoms persist at that time, consider cervical spine workup given her prior cervical spine findings            7/20/2018 12:36 PM    HISTORY/REASON FOR EXAM:  Atraumatic Pain/Swelling/Deformity.      TECHNIQUE/EXAM DESCRIPTION AND NUMBER OF VIEWS:  3 views of the RIGHT shoulder.    COMPARISON: None    FINDINGS:  The bony structures are demineralized, however normally aligned. Minimal spurring is present about the inferior glenoid. Calcifications adjacent to the humeral head are suggestive of calcific tendinitis.   Impression       No radiographic evidence of acute " traumatic bone injury.    Marked bony demineralization.    Findings suggest calcific tendinitis.     Thank you Wilver Stark M.D. for allowing me to participate in caring for your patient.

## 2018-08-23 ENCOUNTER — APPOINTMENT (OUTPATIENT)
Dept: PHYSICAL THERAPY | Facility: REHABILITATION | Age: 68
End: 2018-08-23
Attending: FAMILY MEDICINE
Payer: MEDICARE

## 2018-08-28 ENCOUNTER — APPOINTMENT (OUTPATIENT)
Dept: PHYSICAL THERAPY | Facility: REHABILITATION | Age: 68
End: 2018-08-28
Attending: FAMILY MEDICINE
Payer: MEDICARE

## 2018-08-30 ENCOUNTER — PHYSICAL THERAPY (OUTPATIENT)
Dept: PHYSICAL THERAPY | Facility: REHABILITATION | Age: 68
End: 2018-08-30
Attending: FAMILY MEDICINE
Payer: MEDICARE

## 2018-08-30 DIAGNOSIS — M25.511 RIGHT SHOULDER PAIN, UNSPECIFIED CHRONICITY: ICD-10-CM

## 2018-08-30 DIAGNOSIS — M75.31 CALCIFIC TENDINITIS OF RIGHT SHOULDER: ICD-10-CM

## 2018-08-30 PROCEDURE — 97110 THERAPEUTIC EXERCISES: CPT

## 2018-08-30 NOTE — OP THERAPY DAILY TREATMENT
Outpatient Physical Therapy  DAILY TREATMENT     [facility] Physical [address].  [address]  [place] [address]  Phone:  [phone]  Fax:  [phone]    Date: 08/30/2018    Patient: Greta Tran  YOB: 1950  MRN: [id number]     Time Calculation  Start time: 1530  Stop time: 1600 Time Calculation (min): 30 minutes     Chief Complaint: No chief complaint on file.    Visit #: 2    SUBJECTIVE: almost no shoulder symptoms x 1 week      OBJECTIVE:  Current objective measures:           Therapeutic Exercises (CPT 69795):     1. [place] on wall pink Tband, 3 x 30 sec    2. Row pink, 2 x 15    3. Lat pull , 2 x 15    4. AROM bilateral Shoulders with nuetral cervical spine    5. Posture re-education      Time-based treatments/modalities:  Therapeutic exercise minutes (CPT 04359): 25 minutes       Pain rating before treatment: 0  Pain rating after treatment: 0    ASSESSMENT:   Response to treatment: Asymptomatic throughout there ex. Full pain free shoulder ROM in all planes    PLAN/RECOMMENDATIONS:   Plan for treatment: therapy treatment to continue next visit.  Planned interventions for next visit: E-stim unattended (CPT 52531), manual therapy (CPT 54636), neuromuscular re-education (CPT 49643) and therapeutic exercise (CPT 41183).

## 2018-09-18 ENCOUNTER — APPOINTMENT (OUTPATIENT)
Dept: PHYSICAL THERAPY | Facility: REHABILITATION | Age: 68
End: 2018-09-18
Attending: FAMILY MEDICINE
Payer: MEDICARE

## 2018-09-19 ENCOUNTER — TELEPHONE (OUTPATIENT)
Dept: MEDICAL GROUP | Facility: PHYSICIAN GROUP | Age: 68
End: 2018-09-19

## 2018-09-19 NOTE — TELEPHONE ENCOUNTER
----- Message from Karlie Dimas D.O. sent at 9/19/2018  7:40 AM PDT -----  Please advise pt that the mammogram was normal.  Based on age, recommend repeat testing in one year.    Karlie Dimas D.O.

## 2018-11-01 ENCOUNTER — OFFICE VISIT (OUTPATIENT)
Dept: MEDICAL GROUP | Facility: PHYSICIAN GROUP | Age: 68
End: 2018-11-01
Payer: MEDICARE

## 2018-11-01 VITALS
HEART RATE: 80 BPM | WEIGHT: 177 LBS | TEMPERATURE: 98.4 F | HEIGHT: 63 IN | OXYGEN SATURATION: 94 % | SYSTOLIC BLOOD PRESSURE: 110 MMHG | RESPIRATION RATE: 12 BRPM | DIASTOLIC BLOOD PRESSURE: 78 MMHG | BODY MASS INDEX: 31.36 KG/M2

## 2018-11-01 DIAGNOSIS — J45.20 MILD INTERMITTENT ASTHMA WITHOUT COMPLICATION: ICD-10-CM

## 2018-11-01 DIAGNOSIS — E78.2 MIXED HYPERLIPIDEMIA: ICD-10-CM

## 2018-11-01 DIAGNOSIS — Z23 NEED FOR VACCINATION: ICD-10-CM

## 2018-11-01 DIAGNOSIS — Z88.9 MULTIPLE ALLERGIES: ICD-10-CM

## 2018-11-01 DIAGNOSIS — Z76.89 ESTABLISHING CARE WITH NEW DOCTOR, ENCOUNTER FOR: ICD-10-CM

## 2018-11-01 DIAGNOSIS — Z00.00 ROUTINE HEALTH MAINTENANCE: ICD-10-CM

## 2018-11-01 DIAGNOSIS — Z78.0 POSTMENOPAUSAL: ICD-10-CM

## 2018-11-01 PROBLEM — M54.9 ACUTE UPPER BACK PAIN: Status: RESOLVED | Noted: 2017-11-16 | Resolved: 2018-11-01

## 2018-11-01 PROCEDURE — 99214 OFFICE O/P EST MOD 30 MIN: CPT | Mod: 25 | Performed by: NURSE PRACTITIONER

## 2018-11-01 PROCEDURE — 90670 PCV13 VACCINE IM: CPT | Performed by: NURSE PRACTITIONER

## 2018-11-01 PROCEDURE — G0009 ADMIN PNEUMOCOCCAL VACCINE: HCPCS | Performed by: NURSE PRACTITIONER

## 2018-11-01 ASSESSMENT — PATIENT HEALTH QUESTIONNAIRE - PHQ9: CLINICAL INTERPRETATION OF PHQ2 SCORE: 0

## 2018-11-01 NOTE — LETTER
[facility]  LESLYE Almazan  [address]  [place] [address]  Fax: [phone]   Authorization for Release/Disclosure of   Protected Health Information   Name: GRETA TRAN : 1950 SSN: [id number]   Address: [place] [address]  [place] [address] Phone:    [phone] (home)    I authorize the entity listed below to release/disclose the PHI below to:   [facility]/LESLYE Almazan and LESLYE Almazan   Provider or Entity Name:  [facility]   Address   City, State, [facility]   Phone:      Fax:     Reason for request: continuity of care   Information to be released:    [X] LAST COLONOSCOPY,  including any PATH REPORT and follow-up  [  ] LAST FIT/COLOGUARD RESULT [  ] LAST DEXA  [  ] LAST MAMMOGRAM  [  ] LAST PAP  [  ] LAST LABS [  ] RETINA EXAM REPORT  [  ] IMMUNIZATION RECORDS  [  ] Release all info      [  ] Check here and initial the line next to each item to release ALL health information INCLUDING  _____ Care and treatment for drug and / or alcohol abuse  _____ HIV testing, infection status, or AIDS  _____ Genetic Testing    DATES OF SERVICE OR TIME PERIOD TO BE DISCLOSED: _____________  I understand and acknowledge that:  * This Authorization may be revoked at any time by you in writing, except if your health information has already been used or disclosed.  * Your health information that will be used or disclosed as a result of you signing this authorization could be re-disclosed by the recipient. If this occurs, your re-disclosed health information may no longer be protected by State or Federal laws.  * You may refuse to sign this Authorization. Your refusal will not affect your ability to obtain treatment.  * This Authorization becomes effective upon signing and will  on (date) __________.      If no date is indicated, this Authorization will  one (1) year from the signature date.    Name: Greta Tran    Signature:   Date:          11/1/2018       PLEASE FAX REQUESTED RECORDS BACK TO: [phone]

## 2018-11-01 NOTE — ASSESSMENT & PLAN NOTE
This is a chronic problem for the patient that is stable.  She currently takes Advair 250-50 1 puff every 12 hours reports that this is effective in controlling her symptoms.  Cough, wheezing, shortness of breath.  She does have a rescue inhaler, albuterol, but states that she never needs to use this medication.  She does not need a refill at this time.

## 2018-11-01 NOTE — ASSESSMENT & PLAN NOTE
This is a chronic problem for the patient that is well controlled with brand name tricor 145 mg tablet daily.  The patient's last lipid profile was 6/26/2018 with total cholesterol 134, triglycerides 96, HDL 43, LDL 72.  The patient states that she was required by Medicare to try gemfibrozil 600 mg pills which she states were too large she would choke on them and she was unable to cut them in half. Patient has also tried statin medication in the past, but was unable to tolerate the medication. The patient is also unable to tolerate generic fenofibrate.  Denies any side effects from this medication and does not need a refill at this time.

## 2018-11-01 NOTE — ASSESSMENT & PLAN NOTE
This is a chronic problem for this patient that is controlled.  She is currently taking over-the-counter fexofenadine 60 mg tabs daily and fluticasone 50 mcg/act nasal spray 1 time per day.  She denies any side effects of these medications.

## 2018-11-01 NOTE — PROGRESS NOTES
CC:   Chief Complaint   Patient presents with   • Establish Care     new patient        HISTORY OF THE PRESENT ILLNESS: Patient is a 68 y.o. female. This pleasant patient is here today to establish care and discuss multiple issues as listed below.      Health Maintenance: Completed    Mixed hyperlipidemia  This is a chronic problem for the patient that is well controlled with brand name tricor 145 mg tablet daily.  The patient's last lipid profile was 6/26/2018 with total cholesterol 134, triglycerides 96, HDL 43, LDL 72.  The patient states that she was required by Medicare to try gemfibrozil 600 mg pills which she states were too large she would choke on them and she was unable to cut them in half. Patient has also tried statin medication in the past, but was unable to tolerate the medication. The patient is also unable to tolerate generic fenofibrate.  Denies any side effects from this medication and does not need a refill at this time.    Mild intermittent asthma without complication  This is a chronic problem for the patient that is stable.  She currently takes Advair 250-50 1 puff every 12 hours reports that this is effective in controlling her symptoms.  Cough, wheezing, shortness of breath.  She does have a rescue inhaler, albuterol, but states that she never needs to use this medication.  She does not need a refill at this time.    Multiple allergies  This is a chronic problem for this patient that is controlled.  She is currently taking over-the-counter fexofenadine 60 mg tabs daily and fluticasone 50 mcg/act nasal spray 1 time per day.  She denies any side effects of these medications.    Allergies: Cephalexin; Food; and Levaquin    Current Outpatient Prescriptions Ordered in [facility]   Medication Sig Dispense Refill   • fluticasone-salmeterol (ADVAIR) 250-50 MCG/DOSE AEROSOL POWDER, BREATH ACTIVATED Inhale 1 Puff by mouth every 12 hours.     • fluticasone (FLONASE) 50 MCG/ACT nasal spray Spray 1 Spray in  nose every day.     • albuterol (VENTOLIN OR PROVENTIL) 108 (90 BASE) MCG/ACT AERS inhalation aerosol Inhale 1-2 Puffs by mouth every four hours as needed for Shortness of Breath.     • TRICOR 145 MG Tab TAKE 1 TABLET BY MOUTH EVERY DAY 90 Tab 0   • fexofenadine (ALLEGRA) 60 MG TABS Take 60 mg by mouth every day.       No current [facility]-ordered facility-administered medications on file.        Past Medical History:   Diagnosis Date   • Arthritis    • ASTHMA    • Breath shortness     with exertion   • Factor 5 Leiden mutation, heterozygous (HCC)    • Hypercholesterolemia        Past Surgical History:   Procedure Laterality Date   • JOSELITO BY LAPAROSCOPY N/A 6/19/2015    Procedure: JOSELITO BY LAPAROSCOPY;  Surgeon: Grace Montgomery M.D.;  Location: SURGERY [facility];  Service:    • GYN SURGERY  1993    hysterectomy   • ABDOMINAL HYSTERECTOMY TOTAL     • NASAL POLYPECTOMY         Social History   Substance Use Topics   • Smoking status: Former Smoker     Packs/day: 1.00     Years: 25.00     Types: Cigarettes     Quit date: 5/27/2000   • Smokeless tobacco: Never Used   • Alcohol use Yes      Comment: occasional        Social History     Social History Narrative   • No narrative on file       Family History   Problem Relation Age of Onset   • Cancer Mother         lymphoma   • Stroke Father    • Hypertension Father    • Cancer Sister         colon   • Heart Disease Brother 50        CHF   • Other Daughter         Factor V   • No Known Problems Maternal Grandmother    • No Known Problems Maternal Grandfather    • No Known Problems Paternal Grandmother    • No Known Problems Paternal Grandfather    • Hypertension Sister    • Heart Disease Sister         stent   • Leukemia Brother    • Other Brother         Factor V   • No Known Problems Brother    • Heart Disease Brother         open heart surgery   • No Known Problems Brother    • Pulmonary Embolism Brother        ROS:     - Constitutional:  Negative for fever, chills,  "unexpected weight change, night sweats, body aches, sleep issues,  and fatigue/generalized weakness.     - HEENT:  Negative for headaches, vision changes, hearing changes, ear pain, tinnitus, ear discharge, rhinorrhea, sinus congestion, sneezing, sore throat, and neck pain.      - Respiratory:  Negative for cough, shortness of breath, sputum production, hemoptysis, chest congestion, dyspnea, wheezing, and crackles.      - Cardiovascular:  Negative for chest pain, palpitations, BEACH, paroxsymal nocturnal dyspnea, orthopnea, and bilateral lower extremity edema.     - Gastrointestinal:  Negative for heartburn, nausea, vomiting, abdominal pain, hematochezia, melena, diarrhea, constipation, and greasy/foul-smelling stools.     - Genitourinary:  Negative for dysuria, nocturia, polyuria, hematuria, pyuria, urinary urgency, urinary frequency, and urinary incontinence.     - Musculoskeletal:  Negative for myalgias, back pain, and joint pain.     - Skin:  Negative for rash, sores, lumps, itching, cyanotic skin color change.     - Neurological:  Negative for dizziness, tingling, tremors, focal sensory deficit, focal weakness and headaches.     - Endo/Heme/Allergies:  Does not bruise/bleed easily. Denies cold/heat intolerance.     - Psychiatric/Behavioral: Negative for depression, suicidal/homicidal ideation and memory loss.        Exam: Blood pressure 110/78, pulse 80, temperature 36.9 °C (98.4 °F), resp. rate 12, height 1.6 m (5' 3\"), weight 80.3 kg (177 lb), SpO2 94 %, not currently breastfeeding. Body mass index is 31.35 kg/m².    General:  Normal appearing. No distress.  HEENT:  Normocephalic. Eyes conjunctiva clear lids without ptosis, pupils equal and reactive to light accommodation, ears normal shape and contour.   Pulmonary:  Clear to ausculation.  Normal effort. No rales, ronchi, or wheezing.  Cardiovascular:  Regular rate and rhythm without murmur. Carotid and radial pulses are intact and equal " bilaterally.  Neurologic:  Grossly nonfocal  Skin:  Warm and dry.  No obvious lesions.  Musculoskeletal:  Normal gait. No extremity cyanosis, clubbing, or edema.  Psych:  Normal mood and affect. Alert and oriented x3. Judgment and insight is normal.    Assessment/Plan  1. Mixed hyperlipidemia  This is a chronic problem for this patient that is well controlled with brand name tricor, she does not need a refill of this medication at this time.  We will plan to recheck lipid profile in April 2019 and follow-up with a Medicare annual wellness visit and review labs in May 2019.  - LIPID PROFILE; Future     2. Mild intermittent asthma without complication  This is a chronic problem for the patient that is well controlled with Advair.  Continue using medication as prescribed.  She does not need a refill for this medication or albuterol inhaler.    3. Multiple allergies  This is a chronic problem for the patient that is well controlled with over-the-counter fexofenadine and Flonase.  Continue using these medications to control allergy symptoms.    4. Postmenopausal  Order provided.  - DS-BONE DENSITY STUDY (DEXA); Future    5. Need for vaccination  Patient was given the Prevnar 13 vaccine today.  She was given a prescription for Shingrix and told to go to the pharmacy to find out co-pay costs as well as get on a waiting list for the vaccination.  - Prevnar 13 PCV-13  - Zoster Vac Recomb Adjuvanted (SHINGRIX) 50 MCG Recon Susp; 0.5 mL by Intramuscular route Once for 1 dose.  Dispense: 0.5 mL; Refill: 0    6. Routine health maintenance  The patient will return in May 2019 after completing labs in April 2018.  We will complete her Medicare annual wellness visit in May 2018 as well as review lab work.  - COMP METABOLIC PANEL; Future  - LIPID PROFILE; Future    Educated in proper administration of medication(s) ordered today including safety, possible SE, risks, benefits, rationale and alternatives to therapy.   Supportive care,  differential diagnoses, and indications for immediate follow-up discussed with patient.    Pathogenesis of diagnosis discussed including typical length and natural progression.    Instructed to return to clinic or nearest emergency department for any change in condition, further concerns, or worsening of symptoms.  Patient states understanding of the plan of care and discharge instructions.    Consent for records release signed to order medical records from [facility] for last colonoscopy and Dr. Scotty Wiley for records.    Return in about 6 months (around 5/1/2019), or if symptoms worsen or fail to improve, for Annual Wellness Visit, Follow up Labs.    I have placed the below orders and discussed them with an approved delegating provider. The MA is performing the below orders under the direction of Dr. Caro.    Please note that this dictation was created using voice recognition software. I have made every reasonable attempt to correct obvious errors, but I expect that there are errors of grammar and possibly content that I did not discover before finalizing the note.

## 2018-11-01 NOTE — LETTER
[email or web address]  LESLYE Almazan  [address]  [facility] [address]  Fax: [phone]   Authorization for Release/Disclosure of   Protected Health Information   Name: GRETA FERGUSON : 1950 SSN: [id number]   Address: [address] [address]  [place] [address] Phone:    [phone] (home)    I authorize the entity listed below to release/disclose the PHI below to:   [facility]/LESLYE Almazan and LESLYE Almazan   Provider or Entity Name:  Scotty Wiley   [facility], [facility], [facility]   Phone: [phone]      Fax:     Reason for request: continuity of care   Information to be released:    [  ] LAST COLONOSCOPY,  including any PATH REPORT and follow-up  [  ] LAST FIT/COLOGUARD RESULT [  ] LAST DEXA  [  ] LAST MAMMOGRAM  [  ] LAST PAP  [  ] LAST LABS [  ] RETINA EXAM REPORT  [  ] IMMUNIZATION RECORDS  [  ] Release all info      [  ] Check here and initial the line next to each item to release ALL health information INCLUDING  _____ Care and treatment for drug and / or alcohol abuse  _____ HIV testing, infection status, or AIDS  _____ Genetic Testing    DATES OF SERVICE OR TIME PERIOD TO BE DISCLOSED: _____________  I understand and acknowledge that:  * This Authorization may be revoked at any time by you in writing, except if your health information has already been used or disclosed.  * Your health information that will be used or disclosed as a result of you signing this authorization could be re-disclosed by the recipient. If this occurs, your re-disclosed health information may no longer be protected by State or Federal laws.  * You may refuse to sign this Authorization. Your refusal will not affect your ability to obtain treatment.  * This Authorization becomes effective upon signing and will  on (date) __________.      If no date is indicated, this Authorization will  one (1) year from the signature date.    Name: Greta Ferguson    Signature:   Date:          11/1/2018       PLEASE FAX REQUESTED RECORDS BACK TO: [phone]

## 2019-02-25 RX ORDER — FENOFIBRATE 145 MG/1
TABLET ORAL
Qty: 90 TAB | Refills: 0 | Status: SHIPPED | OUTPATIENT
Start: 2019-02-25 | End: 2019-03-13 | Stop reason: SDUPTHER

## 2019-02-25 NOTE — TELEPHONE ENCOUNTER
Requested Prescriptions     Pending Prescriptions Disp Refills   • TRICOR 145 MG Tab [Pharmacy Med Name: TRICOR 145MG TABLETS] 90 Tab 0     Sig: TAKE 1 TABLET BY MOUTH EVERY DAY       SHANNAN Almazan.

## 2019-03-05 ENCOUNTER — TELEPHONE (OUTPATIENT)
Dept: MEDICAL GROUP | Facility: PHYSICIAN GROUP | Age: 69
End: 2019-03-05

## 2019-03-05 NOTE — TELEPHONE ENCOUNTER
Patient called to ask that a message be relayed to Christie. She wanted to tell her that she has also tried/failed generic for Tricor so she has always been prescribed the Brand name for it. JODI Soriano said she wanted to know all of the drugs she has taken in the past and she forgot about this one.

## 2019-03-05 NOTE — TELEPHONE ENCOUNTER
MEDICATION PRIOR AUTHORIZATION NEEDED:    1. Name of Medication: Tricor 145    2. Requested By (Name of Pharmacy): [facility]     3. Is insurance on file current? Medicare    4. What is the name & phone number of the 3rd party payor? [phone]    Submitted REF number #[id number]

## 2019-03-08 NOTE — TELEPHONE ENCOUNTER
FINAL PRIOR AUTHORIZATION STATUS:    1.  Name of Medication & Dose: Tricor     2. Prior Auth Status: Denied.  Reason: Not enough information    3. Action Taken: Pharmacy Notified: N\A Patient Notified: yes

## 2019-03-13 ENCOUNTER — PATIENT MESSAGE (OUTPATIENT)
Dept: MEDICAL GROUP | Facility: PHYSICIAN GROUP | Age: 69
End: 2019-03-13

## 2019-03-13 DIAGNOSIS — E78.2 MIXED HYPERLIPIDEMIA: ICD-10-CM

## 2019-03-13 RX ORDER — FENOFIBRATE 145 MG/1
145 TABLET ORAL DAILY
Qty: 90 TAB | Refills: 3 | Status: SHIPPED | OUTPATIENT
Start: 2019-03-13 | End: 2020-02-28 | Stop reason: SDUPTHER

## 2019-03-26 ENCOUNTER — OFFICE VISIT (OUTPATIENT)
Dept: MEDICAL GROUP | Facility: PHYSICIAN GROUP | Age: 69
End: 2019-03-26
Payer: MEDICARE

## 2019-03-26 ENCOUNTER — HOSPITAL ENCOUNTER (OUTPATIENT)
Facility: MEDICAL CENTER | Age: 69
End: 2019-03-26
Attending: NURSE PRACTITIONER
Payer: MEDICARE

## 2019-03-26 VITALS
HEIGHT: 63 IN | TEMPERATURE: 98.1 F | SYSTOLIC BLOOD PRESSURE: 138 MMHG | DIASTOLIC BLOOD PRESSURE: 78 MMHG | WEIGHT: 188 LBS | HEART RATE: 73 BPM | RESPIRATION RATE: 12 BRPM | OXYGEN SATURATION: 96 % | BODY MASS INDEX: 33.31 KG/M2

## 2019-03-26 DIAGNOSIS — J02.9 PHARYNGITIS, UNSPECIFIED ETIOLOGY: ICD-10-CM

## 2019-03-26 DIAGNOSIS — J01.40 ACUTE NON-RECURRENT PANSINUSITIS: ICD-10-CM

## 2019-03-26 PROBLEM — J34.89 SINUS PRESSURE: Status: ACTIVE | Noted: 2019-03-26

## 2019-03-26 LAB
INT CON NEG: NEGATIVE
INT CON POS: POSITIVE
S PYO AG THROAT QL: NEGATIVE

## 2019-03-26 PROCEDURE — 87070 CULTURE OTHR SPECIMN AEROBIC: CPT

## 2019-03-26 PROCEDURE — 99212 OFFICE O/P EST SF 10 MIN: CPT | Performed by: NURSE PRACTITIONER

## 2019-03-26 PROCEDURE — 87880 STREP A ASSAY W/OPTIC: CPT | Performed by: NURSE PRACTITIONER

## 2019-03-26 RX ORDER — AZITHROMYCIN 250 MG/1
TABLET, FILM COATED ORAL
Qty: 6 TAB | Refills: 0 | Status: SHIPPED | OUTPATIENT
Start: 2019-03-26 | End: 2019-06-25

## 2019-03-26 NOTE — ASSESSMENT & PLAN NOTE
This is a new problem for the patient that began on Friday, 3/22/2019.  Patient reports that her symptoms began with a sore throat and progressed to sinus congestion, bilateral eye pressure, sinus headache, body aches, chills, occasional nonproductive cough.  The patient denies any ear pain, fevers, or shortness of breath.  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  She continues to use Flonase and Allegra daily and has been using Tylenol as needed for headache and body aches.

## 2019-03-26 NOTE — PROGRESS NOTES
CC:   Chief Complaint   Patient presents with   • Headache     headache, sinus congestion, fatigue, slight cough,        HISTORY OF THE PRESENT ILLNESS: Patient is a 68 y.o. female. This pleasant patient is here today with complaints of headache, sinus congestion, fatigue, cough.    Acute non-recurrent pansinusitis  This is a new problem for the patient that began on Friday, 3/22/2019.  Patient reports that her symptoms began with a sore throat and progressed to sinus congestion, bilateral eye pressure, sinus headache, body aches, chills, occasional nonproductive cough.  The patient denies any ear pain, fevers, or shortness of breath.  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  She continues to use Flonase and Allegra daily and has been using Tylenol as needed for headache and body aches.    Allergies: Cephalexin; Fenofibrate; Food; and Levaquin    Current Outpatient Prescriptions [facility]   Medication Sig Dispense Refill   • azithromycin (ZITHROMAX) 250 MG Tab 500 mg PO x 1 on day 1, then 250 mg PO q 24 hours x 4 days 6 Tab 0   • TRICOR 145 MG Tab Take 1 Tab by mouth every day. 90 Tab 3   • fluticasone-salmeterol (ADVAIR) 250-50 MCG/DOSE AEROSOL POWDER, BREATH ACTIVATED Inhale 1 Puff by mouth every 12 hours.     • fluticasone (FLONASE) 50 MCG/ACT nasal spray Spray 1 Spray in nose every day.     • albuterol (VENTOLIN OR PROVENTIL) 108 (90 BASE) MCG/ACT AERS inhalation aerosol Inhale 1-2 Puffs by mouth every four hours as needed for Shortness of Breath.     • fexofenadine (ALLEGRA) 60 MG TABS Take 60 mg by mouth every day.       No current [facility]-ordered facility-administered medications on file.        Past Medical History:   Diagnosis Date   • Arthritis    • ASTHMA    • Breath shortness     with exertion   • Factor 5 Leiden mutation, heterozygous (HCC)    • Hypercholesterolemia        Past Surgical History:   Procedure Laterality Date    • JOSELITO BY LAPAROSCOPY N/A 6/19/2015    Procedure: JOSELITO BY LAPAROSCOPY;  Surgeon: Grace Montgomery M.D.;  [facility]: SURGERY [facility];  Service:    • GYN SURGERY  1993    hysterectomy   • ABDOMINAL HYSTERECTOMY TOTAL     • NASAL POLYPECTOMY         Social History   Substance Use Topics   • Smoking status: Former Smoker     Packs/day: 1.00     Years: 25.00     Types: Cigarettes     Quit date: 5/27/2000   • Smokeless tobacco: Never Used   • Alcohol use Yes      Comment: occasional        Social History     Social History Narrative   • No narrative on file       Family History   Problem Relation Age of Onset   • Cancer Mother         lymphoma   • Stroke Father    • Hypertension Father    • Cancer Sister         colon   • Heart Disease Brother 50        CHF   • Other Daughter         Factor V   • No Known Problems Maternal Grandmother    • No Known Problems Maternal Grandfather    • No Known Problems Paternal Grandmother    • No Known Problems Paternal Grandfather    • Hypertension Sister    • Heart Disease Sister         stent   • Leukemia Brother    • Other Brother         Factor V   • No Known Problems Brother    • Heart Disease Brother         open heart surgery   • No Known Problems Brother    • Pulmonary Embolism Brother        ROS:   Constitutional: Positive for chills and body aches.  Negative for fever, unexpected weight change, night sweats, sleep issues,  and fatigue/generalized weakness.   HEENT: Positive for headaches, sinus congestion, sore throat.  Negative for vision changes, hearing changes, ear pain, tinnitus, ear discharge, rhinorrhea, sneezing, and neck pain.    Respiratory: Positive for nonproductive cough.  Negative for shortness of breath, sputum production, hemoptysis, chest congestion, dyspnea, wheezing, and crackles.    Cardiovascular:  Negative for chest pain, palpitations, BEACH, paroxsymal nocturnal dyspnea, orthopnea, and bilateral lower extremity edema.   Gastrointestinal:  Negative  "for heartburn, nausea, vomiting, abdominal pain, hematochezia, melena, diarrhea, constipation, and greasy/foul-smelling stools.   Skin:  Negative for rash, sores, lumps, itching, cyanotic skin color change.   Neurological:  Negative for dizziness, tingling, tremors, focal sensory deficit, focal weakness and headaches.       Exam: Blood pressure 138/78, pulse 73, temperature 36.7 °C (98.1 °F), resp. rate 12, height 1.6 m (5' 3\"), weight 85.3 kg (188 lb), SpO2 96 %, not currently breastfeeding. Body mass index is 33.3 kg/m².    General:  Normal appearing. No distress.  HEENT: Tonsils 2+ bilaterally, possible exudates on left tonsil, oropharynx with erythema. Normocephalic. Eyes conjunctiva clear lids without ptosis, pupils equal and reactive to light accommodation, ears normal shape and contour, canals are clear bilaterally, tympanic membranes are benign, nasal mucosa benign.   Pulmonary:  Clear to ausculation.  Normal effort. No rales, ronchi, or wheezing.  Cardiovascular:  Regular rate and rhythm without murmur.  Neurologic:  Grossly nonfocal.  Lymph: Palpable right tonsillar lymph node. No cervical, supraclavicular or axillary lymph nodes are palpable.  Skin:  Warm and dry.  No obvious lesions.  Musculoskeletal:  Normal gait. No extremity cyanosis, clubbing, or edema.  Psych:  Normal mood and affect. Alert and oriented x3. Judgment and insight is normal.    Assessment/Plan:  1. Acute non-recurrent pansinusitis  azithromycin (ZITHROMAX) 250 MG Tab   2. Pharyngitis, unspecified etiology  POCT Rapid Strep A - negative    CULTURE THROAT     Educated in proper administration of medication(s) ordered today including safety, possible SE, risks, benefits, rationale and alternatives to therapy.   Supportive care, differential diagnoses, and indications for immediate follow-up discussed with patient.    Pathogenesis of diagnosis discussed including typical length and natural progression.    Instructed to return to clinic or " nearest emergency department for any change in condition, further concerns, or worsening of symptoms.  Patient states understanding of the plan of care and discharge instructions.    Return if symptoms worsen or fail to improve.    I have placed the below orders and discussed them with an approved delegating provider. The MA is performing the below orders under the direction of Dr. Frankel.    Please note that this dictation was created using voice recognition software. I have made every reasonable attempt to correct obvious errors, but I expect that there are errors of grammar and possibly content that I did not discover before finalizing the note.

## 2019-03-28 LAB
BACTERIA SPEC RESP CULT: NORMAL
SIGNIFICANT IND 70042: NORMAL
SITE SITE: NORMAL
SOURCE SOURCE: NORMAL

## 2019-04-05 NOTE — TELEPHONE ENCOUNTER
Was the patient seen in the last year in this department? Yes     Does patient have an active prescription for medications requested? No     Received Request Via: Pharmacy  
[phone]

## 2019-04-27 ENCOUNTER — HOSPITAL ENCOUNTER (OUTPATIENT)
Dept: LAB | Facility: MEDICAL CENTER | Age: 69
End: 2019-04-27
Attending: NURSE PRACTITIONER
Payer: MEDICARE

## 2019-04-27 DIAGNOSIS — Z00.00 ROUTINE HEALTH MAINTENANCE: ICD-10-CM

## 2019-04-27 DIAGNOSIS — E78.2 MIXED HYPERLIPIDEMIA: ICD-10-CM

## 2019-04-27 DIAGNOSIS — Z76.89 ESTABLISHING CARE WITH NEW DOCTOR, ENCOUNTER FOR: ICD-10-CM

## 2019-04-27 LAB
ALBUMIN SERPL BCP-MCNC: 4.2 G/DL (ref 3.2–4.9)
ALBUMIN/GLOB SERPL: 1.4 G/DL
ALP SERPL-CCNC: 50 U/L (ref 30–99)
ALT SERPL-CCNC: 21 U/L (ref 2–50)
ANION GAP SERPL CALC-SCNC: 7 MMOL/L (ref 0–11.9)
AST SERPL-CCNC: 22 U/L (ref 12–45)
BILIRUB SERPL-MCNC: 0.4 MG/DL (ref 0.1–1.5)
BUN SERPL-MCNC: 17 MG/DL (ref 8–22)
CALCIUM SERPL-MCNC: 9.6 MG/DL (ref 8.5–10.5)
CHLORIDE SERPL-SCNC: 109 MMOL/L (ref 96–112)
CHOLEST SERPL-MCNC: 126 MG/DL (ref 100–199)
CO2 SERPL-SCNC: 28 MMOL/L (ref 20–33)
CREAT SERPL-MCNC: 0.75 MG/DL (ref 0.5–1.4)
FASTING STATUS PATIENT QL REPORTED: NORMAL
GLOBULIN SER CALC-MCNC: 2.9 G/DL (ref 1.9–3.5)
GLUCOSE SERPL-MCNC: 102 MG/DL (ref 65–99)
HDLC SERPL-MCNC: 40 MG/DL
LDLC SERPL CALC-MCNC: 65 MG/DL
POTASSIUM SERPL-SCNC: 4 MMOL/L (ref 3.6–5.5)
PROT SERPL-MCNC: 7.1 G/DL (ref 6–8.2)
SODIUM SERPL-SCNC: 144 MMOL/L (ref 135–145)
TRIGL SERPL-MCNC: 103 MG/DL (ref 0–149)

## 2019-04-27 PROCEDURE — 80053 COMPREHEN METABOLIC PANEL: CPT

## 2019-04-27 PROCEDURE — 36415 COLL VENOUS BLD VENIPUNCTURE: CPT

## 2019-04-27 PROCEDURE — 80061 LIPID PANEL: CPT

## 2019-05-08 ENCOUNTER — HOSPITAL ENCOUNTER (OUTPATIENT)
Dept: RADIOLOGY | Facility: MEDICAL CENTER | Age: 69
End: 2019-05-08
Attending: NURSE PRACTITIONER
Payer: MEDICARE

## 2019-05-08 DIAGNOSIS — Z78.0 POSTMENOPAUSAL: ICD-10-CM

## 2019-05-08 PROBLEM — M85.89 OSTEOPENIA OF MULTIPLE SITES: Status: ACTIVE | Noted: 2019-05-08

## 2019-05-08 PROCEDURE — 77080 DXA BONE DENSITY AXIAL: CPT

## 2019-06-25 ENCOUNTER — OFFICE VISIT (OUTPATIENT)
Dept: MEDICAL GROUP | Facility: PHYSICIAN GROUP | Age: 69
End: 2019-06-25
Payer: MEDICARE

## 2019-06-25 ENCOUNTER — HOSPITAL ENCOUNTER (OUTPATIENT)
Dept: LAB | Facility: MEDICAL CENTER | Age: 69
End: 2019-06-25
Attending: NURSE PRACTITIONER
Payer: MEDICARE

## 2019-06-25 VITALS
SYSTOLIC BLOOD PRESSURE: 118 MMHG | HEIGHT: 63 IN | OXYGEN SATURATION: 93 % | HEART RATE: 93 BPM | TEMPERATURE: 98.1 F | DIASTOLIC BLOOD PRESSURE: 67 MMHG | BODY MASS INDEX: 33.13 KG/M2 | WEIGHT: 187 LBS

## 2019-06-25 DIAGNOSIS — Z11.59 NEED FOR HEPATITIS C SCREENING TEST: ICD-10-CM

## 2019-06-25 DIAGNOSIS — Z12.11 SCREENING FOR COLORECTAL CANCER: ICD-10-CM

## 2019-06-25 DIAGNOSIS — M85.89 OSTEOPENIA OF MULTIPLE SITES: ICD-10-CM

## 2019-06-25 DIAGNOSIS — Z20.5 EXPOSURE TO HEPATITIS C: ICD-10-CM

## 2019-06-25 DIAGNOSIS — Z12.12 SCREENING FOR COLORECTAL CANCER: ICD-10-CM

## 2019-06-25 DIAGNOSIS — E78.2 MIXED HYPERLIPIDEMIA: ICD-10-CM

## 2019-06-25 DIAGNOSIS — Z88.9 MULTIPLE ALLERGIES: ICD-10-CM

## 2019-06-25 DIAGNOSIS — J45.20 MILD INTERMITTENT ASTHMA WITHOUT COMPLICATION: ICD-10-CM

## 2019-06-25 DIAGNOSIS — Z23 NEED FOR VACCINATION: ICD-10-CM

## 2019-06-25 DIAGNOSIS — Z00.00 MEDICARE ANNUAL WELLNESS VISIT, INITIAL: ICD-10-CM

## 2019-06-25 PROBLEM — J01.40 ACUTE NON-RECURRENT PANSINUSITIS: Status: RESOLVED | Noted: 2019-03-26 | Resolved: 2019-06-25

## 2019-06-25 LAB — HCV AB SER QL: NEGATIVE

## 2019-06-25 PROCEDURE — G0438 PPPS, INITIAL VISIT: HCPCS | Performed by: NURSE PRACTITIONER

## 2019-06-25 PROCEDURE — 36415 COLL VENOUS BLD VENIPUNCTURE: CPT

## 2019-06-25 PROCEDURE — 86803 HEPATITIS C AB TEST: CPT

## 2019-06-25 RX ORDER — MULTIVIT-MIN/FOLIC ACID/LUTEIN 400-250MCG
TABLET,CHEWABLE ORAL
COMMUNITY

## 2019-06-25 ASSESSMENT — ACTIVITIES OF DAILY LIVING (ADL): BATHING_REQUIRES_ASSISTANCE: 0

## 2019-06-25 ASSESSMENT — PATIENT HEALTH QUESTIONNAIRE - PHQ9: CLINICAL INTERPRETATION OF PHQ2 SCORE: 0

## 2019-06-25 ASSESSMENT — ENCOUNTER SYMPTOMS: GENERAL WELL-BEING: GOOD

## 2019-06-25 NOTE — PROGRESS NOTES
Chief Complaint   Patient presents with   • Medicare Annual Wellness     HPI:  Greta is a 69 y.o. here for Medicare Annual Wellness Visit    Mild intermittent asthma without complication  This is a chronic problem for the patient that is ongoing and stable.  The patient continues to use Advair 250-50 1 puff every 12 hours and reports that it is effective in controlling her symptoms of coughing, wheezing, shortness of breath.  The patient does have a rescue inhaler, but has not needed this medication in a long time.    Mixed hyperlipidemia  This is a chronic problem for the patient that is ongoing improving.  The patient continues to take brand-name only TriCor 145 mg/day, denies any side effects with brand-name only medication.   2/18/2017 09:00 1/9/2018 07:35 6/26/2018 08:05 4/27/2019 07:23   Cholesterol,Tot 140 125 134 126   Triglycerides 89 87 96 103   HDL 42 57 43 40   LDL 80 51 72 65   VLDL Cholesterol Calc 18          Multiple allergies  This is a chronic problem for the patient that is controlled.  The patient has been seen by allergy and asthma specialist in the past.  The patient continues to take fexofenadine 60 mg tabs daily, fluticasone 50 MCG/ACT nasal spray 1 time per day.    Osteopenia of multiple sites  This is a new problem for the patient that was diagnosed after bone density scan completed on 5/8/2019.  The patient reports taking over-the-counter vitamin D and calcium supplements.  Will plan to repeat bone density scan in May 2021.    Patient Active Problem List    Diagnosis Date Noted   • Osteopenia of multiple sites 05/08/2019   • Mild intermittent asthma without complication 01/23/2017   • Multiple allergies 01/23/2017   • Mixed hyperlipidemia 01/23/2017       Current Outpatient Prescriptions   Medication Sig Dispense Refill   • Calcium Carbonate-Vitamin D (CALTRATE 600+D PO) Take  by mouth.     • Multiple Vitamins-Minerals (CENTRUM SILVER) Chew Tab Take  by mouth.     • Zoster Vac Recomb  Adjuvanted (SHINGRIX) 50 MCG/0.5ML Recon Susp 0.5 mL by Intramuscular route Once for 1 dose. 0.5 mL 0   • tetanus-dipth-acell pertussis (ADACEL) 5-2-15.5 LF-MCG/0.5 Suspension 0.5 mL by Intramuscular route Once PRN for up to 1 dose. 0.5 mL 0   • TRICOR 145 MG Tab Take 1 Tab by mouth every day. 90 Tab 3   • fluticasone-salmeterol (ADVAIR) 250-50 MCG/DOSE AEROSOL POWDER, BREATH ACTIVATED Inhale 1 Puff by mouth every 12 hours.     • fluticasone (FLONASE) 50 MCG/ACT nasal spray Spray 1 Spray in nose every day.     • fexofenadine (ALLEGRA) 60 MG TABS Take 60 mg by mouth every day.       No current facility-administered medications for this visit.       Patient is taking medications as noted in medication list.  Current supplements as per medication list.     Allergies: Cephalexin; Fenofibrate; Food; and Levaquin    Current social contact/activities: Garden, hang out with grandkids, go [place] the lake    Is patient current with immunizations? No, due for TDAP and SHINGRIX (Shingles). Patient is interested in receiving TDAP and SHINGRIX (Shingles) today.    She  reports that she quit smoking about 19 years ago. Her smoking use included Cigarettes. She has a 25.00 pack-year smoking history. She has never used smokeless tobacco. She reports that she drinks alcohol. She reports that she does not use drugs.  Counseling given: Yes    DPA/Advanced directive: Patient does not have an Advanced Directive.  A packet and workshop information was given on Advanced Directives.    ROS:    Gait: Uses no assistive device   Ostomy: No   Other tubes: No   Amputations: No   Chronic oxygen use No   Last eye exam 2017  Wears hearing aids: No   : Denies any urinary leakage during the last 6 months    Depression Screening    Little interest or pleasure in doing things?  0 - not at all  Feeling down, depressed, or hopeless? 0 - not at all  Patient Health Questionnaire Score: 0    If depressive symptoms identified deferred to follow up visit unless  specifically addressed in assessment and plan.    Interpretation of PHQ-9 Total Score   Score Severity   1-4 No Depression   5-9 Mild Depression   10-14 Moderate Depression   15-19 Moderately Severe Depression   20-27 Severe Depression    Screening for Cognitive Impairment    Three Minute Recall (village, kitchen, baby)  3/3 Village kitchen baby  Nate clock face with all 12 numbers and set the hands to show 10 past 10.    10:10  3/5  If cognitive concerns identified, deferred for follow up unless specifically addressed in assessment and plan.    Fall Risk Assessment    Has the patient had two or more falls in the last year or any fall with injury in the last year?  No  If fall risk identified, deferred for follow up unless specifically addressed in assessment and plan.    Safety Assessment    Throw rugs on floor.  No  Handrails on all stairs.  Yes  Good lighting in all hallways.  Yes  Difficulty hearing.  No  Patient counseled about all safety risks that were identified.    Functional Assessment ADLs    Are there any barriers preventing you from cooking for yourself or meeting nutritional needs?  No.    Are there any barriers preventing you from driving safely or obtaining transportation?  No.    Are there any barriers preventing you from using a telephone or calling for help?  No.    Are there any barriers preventing you from shopping?  No.    Are there any barriers preventing you from taking care of your own finances?  No.    Are there any barriers preventing you from managing your medications?  No.    Are there any barriers preventing you from showering, bathing or dressing yourself?  No.    Are you currently engaging in any exercise or physical activity?  Yes.  Walk every morning, everyday elliptical   What is your perception of your health?  Good.    Health Maintenance Summary                HEPATITIS C SCREENING Overdue 1950     IMM DTaP/Tdap/Td Vaccine Overdue 5/3/1971     IMM ZOSTER VACCINES Overdue  12/2/2014      Done 10/7/2014 Imm Admin: Zoster Vaccine Live (ZVL) (Zostavax)    COLONOSCOPY Overdue 4/29/2019      Done 4/29/2015 REFERRAL TO GI FOR COLONOSCOPY     Patient has more history with this topic...    MAMMOGRAM Next Due 9/8/2019      Done 9/8/2018 [id number]    IMM PNEUMOCOCCAL 65+ (ADULT) LOW/MEDIUM RISK SERIES Next Due 11/1/2019      Done 11/1/2018 Imm Admin: Pneumococcal Conjugate Vaccine (Prevnar/PCV-13)     Patient has more history with this topic...    BONE DENSITY Next Due 5/8/2021      Done 5/8/2019 DS-BONE DENSITY STUDY (DEXA)          Patient Care Team:  LESLYE Almazan as PCP - General (Family Medicine)    Social History   Substance Use Topics   • Smoking status: Former Smoker     Packs/day: 1.00     Years: 25.00     Types: Cigarettes     Quit date: 5/27/2000   • Smokeless tobacco: Never Used   • Alcohol use Yes      Comment: occasional      Family History   Problem Relation Age of Onset   • Cancer Mother         lymphoma   • Stroke Father    • Hypertension Father    • Cancer Sister         colon   • Heart Disease Brother 50        CHF   • Other Daughter         Factor V   • No Known Problems Maternal Grandmother    • No Known Problems Maternal Grandfather    • No Known Problems Paternal Grandmother    • No Known Problems Paternal Grandfather    • Hypertension Sister    • Heart Disease Sister         stent   • Leukemia Brother    • Other Brother         Factor V   • No Known Problems Brother    • Heart Disease Brother         open heart surgery   • No Known Problems Brother    • Pulmonary Embolism Brother      She  has a past medical history of Arthritis; ASTHMA; Breath shortness; Factor 5 Leiden mutation, heterozygous (HCC); and Hypercholesterolemia.   Past Surgical History:   Procedure Laterality Date   • JOSELITO BY LAPAROSCOPY N/A 6/19/2015    Procedure: JOSELITO BY LAPAROSCOPY;  Surgeon: Grace Montgomery M.D.;  Location: SURGERY [facility];  Service:    • GYN SURGERY  " 1993    hysterectomy   • ABDOMINAL HYSTERECTOMY TOTAL     • NASAL POLYPECTOMY       Exam:     /67 (BP Location: Right arm, Patient Position: Sitting, BP Cuff Size: Large adult)   Pulse 93   Temp 36.7 °C (98.1 °F) (Temporal)   Ht 1.6 m (5' 3\")   Wt 84.8 kg (187 lb)   SpO2 93%  Body mass index is 33.13 kg/m².    Hearing good.    Dentition good  Alert, oriented in no acute distress.  Eye contact is good, speech goal directed, affect calm    Assessment and Plan. The following treatment and monitoring plan is recommended:    1. Medicare annual wellness visit, initial  Initial Annual Wellness Visit - Includes PPPS ()   2. Mild intermittent asthma without complication  This is a chronic problem for the patient that is stable and ongoing  Continue Advair 250-50 1 puff every 12 hours   3. Mixed hyperlipidemia  This is a chronic problem for the patient that is stable  Continue brand-name only TriCor 145 mg/day   4. Osteopenia of multiple sites  This is a chronic problem for the patient that is ongoing  Continue over-the-counter vitamin D and calcium supplement  Continue weightbearing exercise  Next bone density scan due May 2021   5. Multiple allergies  This is a chronic problem for the patient that is ongoing.  Continue fexofenadine 60 mg/day and fluticasone nasal spray daily   6. Screening for colorectal cancer  REFERRAL TO GI FOR COLONOSCOPY   7. Need for hepatitis C screening test  HEP C VIRUS ANTIBODY   8. Need for vaccination  Zoster Vac Recomb Adjuvanted (SHINGRIX) 50 MCG/0.5ML Recon Susp   Patient provided with paper prescription for Shingrix vaccine.  Advised patient to take prescription to pharmacy to check for availability and/or to be put on a waiting list.  Advised the patient that Shingrix is a series of two consecutive vaccines.    tetanus-dipth-acell pertussis (ADACEL) 5-2-15.5 LF-MCG/0.5 Suspension     Services suggested: No services needed at this time  Health Care Screening recommendations " as per orders if indicated.  Referrals offered: PT/OT/Nutrition counseling/Behavioral Health/Smoking cessation as per orders if indicated.    Discussion today about general wellness and lifestyle habits:    · Prevent falls and reduce trip hazards; Cautioned about securing or removing rugs.  · Have a working fire alarm and carbon monoxide detector;   · Engage in regular physical activity and social activities     Follow-up: Return in 7 months (on 1/20/2020) for High Cholesterol, Follow up Medications.

## 2019-06-25 NOTE — ASSESSMENT & PLAN NOTE
This is a chronic problem for the patient that is ongoing and stable.  The patient continues to use Advair 250-50 1 puff every 12 hours and reports that it is effective in controlling her symptoms of coughing, wheezing, shortness of breath.  The patient does have a rescue inhaler, but has not needed this medication in a long time.

## 2019-06-25 NOTE — ASSESSMENT & PLAN NOTE
This is a chronic problem for the patient that is ongoing improving.  The patient continues to take brand-name only TriCor 145 mg/day, denies any side effects with brand-name only medication.   2/18/2017 09:00 1/9/2018 07:35 6/26/2018 08:05 4/27/2019 07:23   Cholesterol,Tot 140 125 134 126   Triglycerides 89 87 96 103   HDL 42 57 43 40   LDL 80 51 72 65   VLDL Cholesterol Calc 18

## 2019-06-25 NOTE — ASSESSMENT & PLAN NOTE
This is a chronic problem for the patient that is controlled.  The patient has been seen by allergy and asthma specialist in the past.  The patient continues to take fexofenadine 60 mg tabs daily, fluticasone 50 MCG/ACT nasal spray 1 time per day.

## 2019-06-25 NOTE — ASSESSMENT & PLAN NOTE
This is a new problem for the patient that was diagnosed after bone density scan completed on 5/8/2019.  The patient reports taking over-the-counter vitamin D and calcium supplements.  Will plan to repeat bone density scan in May 2021.

## 2019-07-31 NOTE — ASSESSMENT & PLAN NOTE
Ongoing issue. Patient reports that last week she started having issues with muscle cramps, weakness, headaches, and fatigue. She attributed this to her cholesterol medicine. She stopped her cholesterol medicine 5 days ago and is started to feel significantly better. Of note, patient had been on his cholesterol medicine for greater than 5 years and had never had any issues until recently.   Patient/Caregiver provided printed discharge information.

## 2019-12-07 ENCOUNTER — OFFICE VISIT (OUTPATIENT)
Dept: URGENT CARE | Facility: PHYSICIAN GROUP | Age: 69
End: 2019-12-07
Payer: MEDICARE

## 2019-12-07 VITALS
HEART RATE: 71 BPM | DIASTOLIC BLOOD PRESSURE: 90 MMHG | RESPIRATION RATE: 15 BRPM | WEIGHT: 180 LBS | SYSTOLIC BLOOD PRESSURE: 140 MMHG | OXYGEN SATURATION: 94 % | TEMPERATURE: 99 F | BODY MASS INDEX: 31.89 KG/M2 | HEIGHT: 63 IN

## 2019-12-07 DIAGNOSIS — J45.909 ASTHMATIC BRONCHITIS WITHOUT COMPLICATION, UNSPECIFIED ASTHMA SEVERITY, UNSPECIFIED WHETHER PERSISTENT: ICD-10-CM

## 2019-12-07 PROCEDURE — 99214 OFFICE O/P EST MOD 30 MIN: CPT | Performed by: PHYSICIAN ASSISTANT

## 2019-12-07 RX ORDER — PREDNISONE 20 MG/1
TABLET ORAL
Qty: 10 TAB | Refills: 0 | Status: SHIPPED | OUTPATIENT
Start: 2019-12-07 | End: 2020-01-08

## 2019-12-07 RX ORDER — AZITHROMYCIN 250 MG/1
TABLET, FILM COATED ORAL
Qty: 6 TAB | Refills: 0 | Status: SHIPPED | OUTPATIENT
Start: 2019-12-07 | End: 2020-01-08

## 2019-12-07 ASSESSMENT — ENCOUNTER SYMPTOMS
SHORTNESS OF BREATH: 1
FEVER: 0
COUGH: 1
WHEEZING: 1

## 2019-12-07 NOTE — PROGRESS NOTES
"Subjective:      Greta Tran is a 69 y.o. female who presents with Cough (Congestion, x 2 days)            Cough   This is a new problem. The current episode started 1 to 4 weeks ago. The problem has been gradually worsening. The problem occurs every few minutes. The cough is productive of sputum. Associated symptoms include ear congestion, nasal congestion, postnasal drip, shortness of breath and wheezing. Pertinent negatives include no fever. The symptoms are aggravated by cold air, lying down and exercise. She has tried a beta-agonist inhaler for the symptoms. The treatment provided mild relief. Her past medical history is significant for asthma.       Review of Systems   Constitutional: Negative for fever.   HENT: Positive for postnasal drip.    Respiratory: Positive for cough, shortness of breath and wheezing.           Objective:     /90   Pulse 71   Temp 37.2 °C (99 °F)   Resp 15   Ht 1.6 m (5' 3\")   Wt 81.6 kg (180 lb)   SpO2 94%   BMI 31.89 kg/m²      Physical Exam       Gen.: Patient is A and O ×3, no acute distress, well-nourished well-hydrated  Vitals: Are listed and unremarkable  HEENT: Heads normocephalic, atraumatic, PERRLA, extraocular movements intact, TMs and oropharynx clear  Neck: Soft supple without cervical lymphadenopathy  Cardiovascular: Regular rate and rhythm normal S1 and S2. No murmurs, rubs or gallops  Lungs are clear to auscultation bilaterally. no wheezes rales or rhonchi  Abdomen is soft, nontender, nondistended with good bowel sounds, no hepatosplenomegaly  Skin: Is well perfused without evidence of rash or lesions  Neurological:  cranial nerves II through XII were assessed and intact.  Musculoskeletal: Full range of motion, 5 out of 5 strength against resistance  Neurovascularly: Intact with a 2 second cap refill, good distal pulses       Assessment/Plan:       1. Asthmatic bronchitis without complication, unspecified asthma severity, unspecified whether " persistent  Do Your nebulizer and breathing treatments at home.  Continue home asthma maintenance.  Follow-up if symptoms persist or worsen despite treatment  - predniSONE (DELTASONE) 20 MG Tab; Take one pill twice a day for five days  Dispense: 10 Tab; Refill: 0  - azithromycin (ZITHROMAX) 250 MG Tab; Take two tablets on day one, then on tablet the following four days  Dispense: 6 Tab; Refill: 0

## 2020-01-08 ENCOUNTER — OFFICE VISIT (OUTPATIENT)
Dept: MEDICAL GROUP | Facility: PHYSICIAN GROUP | Age: 70
End: 2020-01-08
Payer: MEDICARE

## 2020-01-08 VITALS
DIASTOLIC BLOOD PRESSURE: 72 MMHG | TEMPERATURE: 97.5 F | SYSTOLIC BLOOD PRESSURE: 132 MMHG | OXYGEN SATURATION: 100 % | HEART RATE: 100 BPM | WEIGHT: 183 LBS | BODY MASS INDEX: 32.43 KG/M2 | HEIGHT: 63 IN

## 2020-01-08 DIAGNOSIS — J45.20 MILD INTERMITTENT ASTHMA WITHOUT COMPLICATION: ICD-10-CM

## 2020-01-08 DIAGNOSIS — Z88.9 MULTIPLE ALLERGIES: ICD-10-CM

## 2020-01-08 DIAGNOSIS — Z23 NEED FOR VACCINATION: ICD-10-CM

## 2020-01-08 DIAGNOSIS — E78.2 MIXED HYPERLIPIDEMIA: ICD-10-CM

## 2020-01-08 DIAGNOSIS — E66.9 OBESITY (BMI 30-39.9): ICD-10-CM

## 2020-01-08 DIAGNOSIS — M85.89 OSTEOPENIA OF MULTIPLE SITES: ICD-10-CM

## 2020-01-08 PROCEDURE — G0009 ADMIN PNEUMOCOCCAL VACCINE: HCPCS | Performed by: NURSE PRACTITIONER

## 2020-01-08 PROCEDURE — 90732 PPSV23 VACC 2 YRS+ SUBQ/IM: CPT | Performed by: NURSE PRACTITIONER

## 2020-01-08 PROCEDURE — 99214 OFFICE O/P EST MOD 30 MIN: CPT | Mod: 25 | Performed by: NURSE PRACTITIONER

## 2020-01-08 ASSESSMENT — PATIENT HEALTH QUESTIONNAIRE - PHQ9: CLINICAL INTERPRETATION OF PHQ2 SCORE: 0

## 2020-01-09 NOTE — ASSESSMENT & PLAN NOTE
"Will need prior auth 2020 for brand name tricor  Chronic, ongoing.  Currently taking brand-name only TriCor 145 mg/day as directed.  Patient's current prescription and prior Auth through her insurance will  by 2020, she will need to have prior Auth completed prior to refill again this year.  Denies side effects of the medication--no myalgias or abdominal pain.  Reports diet is \"okay\".   She is not following a low-cholesterol diet.  She is not exercising regularly.  She is not taking ASA daily.  She denies dizziness, claudication, or chest pain.  Due for updated lab work in April/May 2020.  "

## 2020-01-09 NOTE — ASSESSMENT & PLAN NOTE
"This is a new problem to the examiner.  Chronic problem for the patient that is ongoing.  Patient reports that her diet is okay and she does not participate in regular physical exercise.  Vitals 3/26/2019 6/25/2019 12/7/2019 1/8/2020   WEIGHT 188 187 180 183   HEIGHT 5' 3\" 5' 3\" 5' 3\" 5' 3\"   BMI 33.3 kg/m2 33.13 kg/m2 31.89 kg/m2 32.42 kg/m2     "

## 2020-01-09 NOTE — ASSESSMENT & PLAN NOTE
Chronic problem for the patient that is ongoing and stable.  Patient continues to use Advair 250-50 MCG/dose 1 puff every 12 hours.  Patient reports that generally this medication is effective in controlling her symptoms of coughing, wheezing, shortness of breath.  The patient does have a rescue albuterol inhaler.  Will be continuing Qvar inhaler for 1 more week due to recent upper respiratory infection.  Reports symptoms are improving.

## 2020-01-09 NOTE — ASSESSMENT & PLAN NOTE
Last DEXA: 5/8/2019  Next DEXA due: May 2021  Reports she is currently supplementing vitamin D and calcium.  She reports she is not engaging in routine weightbearing exercise.  Denies recent falls or fractures.

## 2020-01-09 NOTE — ASSESSMENT & PLAN NOTE
Chronic problem for the patient that is controlled with over-the-counter Allegra 60 mg/day and fluticasone nasal spray daily.

## 2020-01-09 NOTE — PROGRESS NOTES
"CC:   Chief Complaint   Patient presents with   • Hyperlipidemia     follow up      HISTORY OF THE PRESENT ILLNESS: Patient is a 69 y.o. female. This pleasant patient is here today follow-up on hyperlipidemia and discuss other issues as listed below.    Health Maintenance: Completed    Mixed hyperlipidemia  Will need prior auth 2020 for brand name tricor  Chronic, ongoing.  Currently taking brand-name only TriCor 145 mg/day as directed.  Patient's current prescription and prior Auth through her insurance will  by 2020, she will need to have prior Auth completed prior to refill again this year.  Denies side effects of the medication--no myalgias or abdominal pain.  Reports diet is \"okay\".   She is not following a low-cholesterol diet.  She is not exercising regularly.  She is not taking ASA daily.  She denies dizziness, claudication, or chest pain.  Due for updated lab work in April/May 2020.    Obesity (BMI 30-39.9)  This is a new problem to the examiner.  Chronic problem for the patient that is ongoing.  Patient reports that her diet is okay and she does not participate in regular physical exercise.  Vitals 3/26/2019 2019 2019 2020   WEIGHT 188 187 180 183   HEIGHT 5' 3\" 5' 3\" 5' 3\" 5' 3\"   BMI 33.3 kg/m2 33.13 kg/m2 31.89 kg/m2 32.42 kg/m2       Osteopenia of multiple sites  Last DEXA: 2019  Next DEXA due: May 2021  Reports she is currently supplementing vitamin D and calcium.  She reports she is not engaging in routine weightbearing exercise.  Denies recent falls or fractures.    Mild intermittent asthma without complication  Chronic problem for the patient that is ongoing and stable.  Patient continues to use Advair 250-50 MCG/dose 1 puff every 12 hours.  Patient reports that generally this medication is effective in controlling her symptoms of coughing, wheezing, shortness of breath.  The patient does have a rescue albuterol inhaler.  Will be continuing Qvar inhaler for 1 " more week due to recent upper respiratory infection.  Reports symptoms are improving.    Multiple allergies  Chronic problem for the patient that is controlled with over-the-counter Allegra 60 mg/day and fluticasone nasal spray daily.    Allergies: Cephalexin; Fenofibrate; Food; and Levaquin  Current Outpatient Medications Ordered in Epic   Medication Sig Dispense Refill   • Calcium Carbonate-Vitamin D (CALTRATE 600+D PO) Take  by mouth.     • Multiple Vitamins-Minerals (CENTRUM SILVER) Chew Tab Take  by mouth.     • TRICOR 145 MG Tab Take 1 Tab by mouth every day. 90 Tab 3   • fluticasone-salmeterol (ADVAIR) 250-50 MCG/DOSE AEROSOL POWDER, BREATH ACTIVATED Inhale 1 Puff by mouth every 12 hours.     • fluticasone (FLONASE) 50 MCG/ACT nasal spray Spray 1 Spray in nose every day.     • fexofenadine (ALLEGRA) 60 MG TABS Take 60 mg by mouth every day.       No current [facility]-ordered facility-administered medications on file.      Past Medical History:   Diagnosis Date   • Arthritis    • ASTHMA    • Breath shortness     with exertion   • Factor 5 Leiden mutation, heterozygous (HCC)    • Hypercholesterolemia      Past Surgical History:   Procedure Laterality Date   • JOSELITO BY LAPAROSCOPY N/A 2015    Procedure: JOSELITO BY LAPAROSCOPY;  Surgeon: Grace Montgomery M.D.;  Location: SURGERY [facility];  Service:    • GYN SURGERY      hysterectomy   • ABDOMINAL HYSTERECTOMY TOTAL     • NASAL POLYPECTOMY       Social History     Tobacco Use   • Smoking status: Former Smoker     Packs/day: 1.00     Years: 25.00     Pack years: 25.00     Types: Cigarettes     Last attempt to quit: 2000     Years since quittin.6   • Smokeless tobacco: Never Used   Substance Use Topics   • Alcohol use: Yes     Comment: occasional    • Drug use: No     Social History     Patient does not qualify to have social determinant information on file (likely too young).   Social History Narrative   • Not on file     Family History   Problem  "Relation Age of Onset   • Cancer Mother         lymphoma   • Stroke Father    • Hypertension Father    • Cancer Sister         colon   • Heart Disease Brother 50        CHF   • Other Daughter         Factor V   • No Known Problems Maternal Grandmother    • No Known Problems Maternal Grandfather    • No Known Problems Paternal Grandmother    • No Known Problems Paternal Grandfather    • Hypertension Sister    • Heart Disease Sister         stent   • Leukemia Brother    • Other Brother         Factor V   • No Known Problems Brother    • Heart Disease Brother         open heart surgery   • No Known Problems Brother    • Pulmonary Embolism Brother      ROS:   Constitutional:  Negative for fever, chills, unexpected weight change, night sweats, body aches, sleep issues, and fatigue/generalized weakness.   HEENT:  Negative for headaches, vision changes, hearing changes, ear pain, tinnitus, ear discharge, rhinorrhea, sinus congestion, sneezing, sore throat, and neck pain.    Respiratory:  Negative for cough, shortness of breath, sputum production, hemoptysis, chest congestion, dyspnea, wheezing, and crackles.    Cardiovascular:  Negative for chest pain, palpitations, BEACH, paroxsymal nocturnal dyspnea, orthopnea, and bilateral lower extremity edema.   Musculoskeletal:  Negative for myalgias, back pain, and joint pain.   Skin:  Negative for rash, sores, lumps, itching, cyanotic skin color change.   Neurological:  Negative for dizziness, tingling, tremors, focal sensory deficit, focal weakness and headaches.   Endo/Heme/Allergies: Positive for environmental allergies.  Does not bruise/bleed easily. Denies cold/heat intolerance.   Psychiatric/Behavioral: Negative for depression, suicidal/homicidal ideation and memory loss.        Exam: /72 (BP Location: Right arm, Patient Position: Sitting, BP Cuff Size: Adult)   Pulse 100   Temp 36.4 °C (97.5 °F) (Temporal)   Ht 1.6 m (5' 3\")   Wt 83 kg (183 lb)   SpO2 100%  Body " mass index is 32.42 kg/m².    General:  Normal appearing. No distress.  HEENT:  Normocephalic. Eyes conjunctiva clear lids without ptosis, pupils equal and reactive to light accommodation, ears normal shape and contour.  Neck:  Supple without JVD or bruit.  Pulmonary:  Clear to ausculation.  Normal effort. No rales, rhonchi, or wheezing.  Cardiovascular:  Regular rate and rhythm without murmur. Carotid and radial pulses are intact and equal bilaterally.  Neurologic:  Grossly non focal.  Skin:  Warm and dry.  No obvious lesions.  Musculoskeletal:  Normal gait. No extremity cyanosis, clubbing, or edema.  Psych:  Normal mood and affect. Alert and oriented x3. Judgment and insight is normal.    Assessment/Plan:  1. Mixed hyperlipidemia  Continue brand-name only TriCor 145 mg/day.  Patient will need refill and prior Auth in March 2020.  Due for updated labs in April 2020.  - Comp Metabolic Panel; Future  - Lipid Profile; Future    2. Obesity (BMI 30-39.9)  Encourage healthy diet, exercise, weight loss.  Patient's body mass index is 32.42 kg/m². Exercise and nutrition counseling were performed at this visit.  - Patient identified as having weight management issue.  Appropriate orders and counseling given.  - CBC WITH DIFFERENTIAL; Future  - Comp Metabolic Panel; Future  - TSH WITH REFLEX TO FT4; Future    3. Mild intermittent asthma without complication  Chronic, stable.  Continue Advair inhaler twice daily and albuterol rescue inhaler as needed.    4. Multiple allergies  Chronic, stable.  Continue over-the-counter Allegra and fluticasone nasal spray daily.    5. Osteopenia of multiple sites  Encouraged weightbearing exercises daily and fall prevention.  Continue over-the-counter vitamin D and calcium supplement daily.  Due for updated DEXA scan in 2021.    6. Need for vaccination  Given today.  - PneumoVax PPV23 =>1yo    Educated in proper administration of medication(s) ordered today including safety, possible SE,  risks, benefits, rationale and alternatives to therapy.   Supportive care, differential diagnoses, and indications for immediate follow-up discussed with patient.    Pathogenesis of diagnosis discussed including typical length and natural progression.    Instructed to return to clinic or nearest emergency department for any change in condition, further concerns, or worsening of symptoms.  Patient states understanding of the plan of care and discharge instructions.    Return in about 25 weeks (around 7/1/2020) for HC/PCP Annual Wellness Visit- [facility], As needed.    I have placed the below orders and discussed them with an approved delegating provider. The MA is performing the below orders under the direction of Dr. Frankel.    Please note that this dictation was created using voice recognition software. I have made every reasonable attempt to correct obvious errors, but I expect that there are errors of grammar and possibly content that I did not discover before finalizing the note.

## 2020-02-28 DIAGNOSIS — E78.2 MIXED HYPERLIPIDEMIA: ICD-10-CM

## 2020-02-28 RX ORDER — FENOFIBRATE 145 MG/1
145 TABLET ORAL DAILY
Qty: 90 TAB | Refills: 0 | Status: SHIPPED | OUTPATIENT
Start: 2020-02-28 | End: 2020-05-20

## 2020-02-28 NOTE — TELEPHONE ENCOUNTER
Requested Prescriptions     Signed Prescriptions Disp Refills   • TRICOR 145 MG Tab 90 Tab 0     Sig: Take 1 Tab by mouth every day.     Authorizing Provider: PATRIA RIGGINS A.P.R.N.

## 2020-02-28 NOTE — TELEPHONE ENCOUNTER
Received request via: Pharmacy    Was the patient seen in the last year in this department? Yes LOV 01/08/2020    Does the patient have an active prescription (recently filled or refills available) for medication(s) requested? No

## 2020-03-04 ENCOUNTER — TELEPHONE (OUTPATIENT)
Dept: MEDICAL GROUP | Facility: PHYSICIAN GROUP | Age: 70
End: 2020-03-04

## 2020-03-04 NOTE — TELEPHONE ENCOUNTER
DOCUMENTATION OF PAR STATUS:    1. Name of Medication & Dose: Tricor     2. Name of Prescription Coverage Company & phone #: [employer]    3. Date Prior Auth Submitted: 03/4/2020    4. What information was given to obtain insurance decision? Dx code tried and failed medication     5. Prior Auth Status? Pending    6. Patient Notified: N\A    Greta Tran (Key: [id number])     [employer] is reviewing your PA request. Typically an electronic response will be received within 72 hours. To check for an update later, open this request from your dashboard.  You may close this dialog and return to your dashboard to perform other tasks.

## 2020-03-05 NOTE — TELEPHONE ENCOUNTER
FINAL PRIOR AUTHORIZATION STATUS:    1.  Name of Medication & Dose: Tricor 145 mg     2. Prior Auth Status: Approved through 12/31/2020     3. Action Taken: Pharmacy Notified: yes Patient Notified: yes

## 2020-05-20 DIAGNOSIS — E78.2 MIXED HYPERLIPIDEMIA: ICD-10-CM

## 2020-05-20 RX ORDER — FENOFIBRATE 145 MG/1
TABLET ORAL
Qty: 90 TAB | Refills: 3 | Status: SHIPPED | OUTPATIENT
Start: 2020-05-20 | End: 2021-05-03

## 2020-05-20 NOTE — TELEPHONE ENCOUNTER
Requested Prescriptions     Pending Prescriptions Disp Refills   • TRICOR 145 MG Tab [Pharmacy Med Name: TRICOR 145MG TABLETS] 90 Tab 3     Sig: TAKE 1 TABLET BY MOUTH EVERY DAY       SHANNAN Almazan.

## 2020-06-11 ENCOUNTER — HOSPITAL ENCOUNTER (OUTPATIENT)
Dept: LAB | Facility: MEDICAL CENTER | Age: 70
End: 2020-06-11
Attending: NURSE PRACTITIONER
Payer: MEDICARE

## 2020-06-11 DIAGNOSIS — E78.2 MIXED HYPERLIPIDEMIA: ICD-10-CM

## 2020-06-11 DIAGNOSIS — E66.9 OBESITY (BMI 30-39.9): ICD-10-CM

## 2020-06-11 LAB
ALBUMIN SERPL BCP-MCNC: 4.2 G/DL (ref 3.2–4.9)
ALBUMIN/GLOB SERPL: 1.3 G/DL
ALP SERPL-CCNC: 51 U/L (ref 30–99)
ALT SERPL-CCNC: 20 U/L (ref 2–50)
ANION GAP SERPL CALC-SCNC: 13 MMOL/L (ref 7–16)
AST SERPL-CCNC: 20 U/L (ref 12–45)
BASOPHILS # BLD AUTO: 1 % (ref 0–1.8)
BASOPHILS # BLD: 0.05 K/UL (ref 0–0.12)
BILIRUB SERPL-MCNC: 0.4 MG/DL (ref 0.1–1.5)
BUN SERPL-MCNC: 17 MG/DL (ref 8–22)
CALCIUM SERPL-MCNC: 9.7 MG/DL (ref 8.5–10.5)
CHLORIDE SERPL-SCNC: 105 MMOL/L (ref 96–112)
CHOLEST SERPL-MCNC: 127 MG/DL (ref 100–199)
CO2 SERPL-SCNC: 24 MMOL/L (ref 20–33)
CREAT SERPL-MCNC: 0.71 MG/DL (ref 0.5–1.4)
EOSINOPHIL # BLD AUTO: 0.41 K/UL (ref 0–0.51)
EOSINOPHIL NFR BLD: 8.5 % (ref 0–6.9)
ERYTHROCYTE [DISTWIDTH] IN BLOOD BY AUTOMATED COUNT: 45.5 FL (ref 35.9–50)
FASTING STATUS PATIENT QL REPORTED: NORMAL
GLOBULIN SER CALC-MCNC: 3.3 G/DL (ref 1.9–3.5)
GLUCOSE SERPL-MCNC: 97 MG/DL (ref 65–99)
HCT VFR BLD AUTO: 45.1 % (ref 37–47)
HDLC SERPL-MCNC: 37 MG/DL
HGB BLD-MCNC: 14.5 G/DL (ref 12–16)
IMM GRANULOCYTES # BLD AUTO: 0.01 K/UL (ref 0–0.11)
IMM GRANULOCYTES NFR BLD AUTO: 0.2 % (ref 0–0.9)
LDLC SERPL CALC-MCNC: 66 MG/DL
LYMPHOCYTES # BLD AUTO: 1.53 K/UL (ref 1–4.8)
LYMPHOCYTES NFR BLD: 31.8 % (ref 22–41)
MCH RBC QN AUTO: 30.5 PG (ref 27–33)
MCHC RBC AUTO-ENTMCNC: 32.2 G/DL (ref 33.6–35)
MCV RBC AUTO: 94.7 FL (ref 81.4–97.8)
MONOCYTES # BLD AUTO: 0.35 K/UL (ref 0–0.85)
MONOCYTES NFR BLD AUTO: 7.3 % (ref 0–13.4)
NEUTROPHILS # BLD AUTO: 2.46 K/UL (ref 2–7.15)
NEUTROPHILS NFR BLD: 51.2 % (ref 44–72)
NRBC # BLD AUTO: 0 K/UL
NRBC BLD-RTO: 0 /100 WBC
PLATELET # BLD AUTO: 262 K/UL (ref 164–446)
PMV BLD AUTO: 9.4 FL (ref 9–12.9)
POTASSIUM SERPL-SCNC: 4.3 MMOL/L (ref 3.6–5.5)
PROT SERPL-MCNC: 7.5 G/DL (ref 6–8.2)
RBC # BLD AUTO: 4.76 M/UL (ref 4.2–5.4)
SODIUM SERPL-SCNC: 142 MMOL/L (ref 135–145)
TRIGL SERPL-MCNC: 119 MG/DL (ref 0–149)
TSH SERPL DL<=0.005 MIU/L-ACNC: 1.58 UIU/ML (ref 0.38–5.33)
WBC # BLD AUTO: 4.8 K/UL (ref 4.8–10.8)

## 2020-06-11 PROCEDURE — 84443 ASSAY THYROID STIM HORMONE: CPT

## 2020-06-11 PROCEDURE — 36415 COLL VENOUS BLD VENIPUNCTURE: CPT

## 2020-06-11 PROCEDURE — 85025 COMPLETE CBC W/AUTO DIFF WBC: CPT

## 2020-06-11 PROCEDURE — 80053 COMPREHEN METABOLIC PANEL: CPT

## 2020-06-11 PROCEDURE — 80061 LIPID PANEL: CPT

## 2020-07-30 ENCOUNTER — OFFICE VISIT (OUTPATIENT)
Dept: MEDICAL GROUP | Facility: PHYSICIAN GROUP | Age: 70
End: 2020-07-30
Payer: MEDICARE

## 2020-07-30 VITALS
HEIGHT: 63 IN | BODY MASS INDEX: 33.13 KG/M2 | RESPIRATION RATE: 14 BRPM | OXYGEN SATURATION: 91 % | SYSTOLIC BLOOD PRESSURE: 134 MMHG | DIASTOLIC BLOOD PRESSURE: 80 MMHG | WEIGHT: 187 LBS | TEMPERATURE: 97.7 F | HEART RATE: 87 BPM

## 2020-07-30 DIAGNOSIS — R59.0 ENLARGED LYMPH NODE IN NECK: ICD-10-CM

## 2020-07-30 PROCEDURE — 99214 OFFICE O/P EST MOD 30 MIN: CPT | Performed by: NURSE PRACTITIONER

## 2020-07-30 ASSESSMENT — FIBROSIS 4 INDEX: FIB4 SCORE: 1.16

## 2020-07-30 NOTE — PROGRESS NOTES
CC:   Chief Complaint   Patient presents with   • Edema     lymphnode right side of neck     HISTORY OF THE PRESENT ILLNESS: Patient is a 89 y.o. female. This pleasant patient is here today to discuss enlarged lymph node on right side of neck.    Health Maintenance: Completed    Enlarged lymph node in neck  New to this examiner. States it has been like that for awhile. She states that it has always been noticeable to her, but unsure of when she first saw it. Last week she saw her allergist she was told to have it checked out. Denies sore throat, cough, fever or chills. Denies pain or tenderness.     Allergies: Cephalexin; Fenofibrate; Food; and Levaquin  Current Outpatient Medications Ordered in Epic   Medication Sig Dispense Refill   • TRICOR 145 MG Tab TAKE 1 TABLET BY MOUTH EVERY DAY 90 Tab 3   • Calcium Carbonate-Vitamin D (CALTRATE 600+D PO) Take  by mouth.     • Multiple Vitamins-Minerals (CENTRUM SILVER) Chew Tab Take  by mouth.     • fluticasone-salmeterol (ADVAIR) 250-50 MCG/DOSE AEROSOL POWDER, BREATH ACTIVATED Inhale 1 Puff by mouth every 12 hours.     • fluticasone (FLONASE) 50 MCG/ACT nasal spray Spray 1 Spray in nose every day.     • fexofenadine (ALLEGRA) 60 MG TABS Take 60 mg by mouth every day.       No current [facility]-ordered facility-administered medications on file.      Past Medical History:   Diagnosis Date   • Arthritis    • ASTHMA    • Breath shortness     with exertion   • Factor 5 Leiden mutation, heterozygous (HCC)    • Hypercholesterolemia      Past Surgical History:   Procedure Laterality Date   • JOSELITO BY LAPAROSCOPY N/A 6/19/2015    Procedure: JOSELITO BY LAPAROSCOPY;  Surgeon: Grace Montgomery M.D.;  Location: SURGERY [facility];  Service:    • GYN SURGERY  1993    hysterectomy   • ABDOMINAL HYSTERECTOMY TOTAL     • NASAL POLYPECTOMY       Social History     Tobacco Use   • Smoking status: Former Smoker     Packs/day: 1.00     Years: 25.00     Pack years: 25.00     Types: Cigarettes  "    Last attempt to quit: 2000     Years since quittin.1   • Smokeless tobacco: Never Used   Substance Use Topics   • Alcohol use: Yes     Comment: occasional    • Drug use: No     Social History     Social History Narrative   • Not on file     Family History   Problem Relation Age of Onset   • Cancer Mother         lymphoma   • Stroke Father    • Hypertension Father    • Cancer Sister         colon   • Heart Disease Brother 50        CHF   • Other Daughter         Factor V   • No Known Problems Maternal Grandmother    • No Known Problems Maternal Grandfather    • No Known Problems Paternal Grandmother    • No Known Problems Paternal Grandfather    • Hypertension Sister    • Heart Disease Sister         stent   • Leukemia Brother    • Other Brother         Factor V   • No Known Problems Brother    • Heart Disease Brother         open heart surgery   • No Known Problems Brother    • Pulmonary Embolism Brother      ROS:   Constitutional: No fevers or night sweats.  Eyes: No changes in vision or eye pain.  ENMT: Positive right neck mass. No ear discharge, nosebleeds or bleeding gums.  CV: No chest pain or palpitations.  Resp: No SOB or cough.  GI: No nausea, vomiting, constipation, or diarrhea.  : No difficulty in urination or blood in urine.  MSK: No muscle or joint pain.  Skin: No rashes or itching.  Neurologic: No headaches or tremors.  Psych: No depression or anxiety. Denies suicidal thoughts or thoughts of harming themselves.      Exam: /80 (BP Location: Left arm, Patient Position: Sitting, BP Cuff Size: Adult)   Pulse 87   Temp 36.5 °C (97.7 °F) (Temporal)   Resp 14   Ht 1.6 m (5' 3\")   Wt 84.8 kg (187 lb)   SpO2 91%  Body mass index is 33.13 kg/m².    General: Well nourished, well developed female in NAD, awake and conversant.  Eyes: Normal conjunctiva, anicteric.  Round symmetrical pupils.  ENT: Hearing grossly intact.  No nasal discharge.  Neck: Right side lymph node palpable firm and " nontender mass the size of a grape. Neck is supple.  CV: No lower extremity edema.  Respiratory: Respirations are nonlabored.  No wheezing.  Abdomen: Non-Distended.  Skin: Warm.  No rashes or ulcers.  MSK: Normal ambulation.  No clubbing or cyanosis.  Neuro: Sensation and CN II-XII grossly normal.  Psych: Alert and oriented.  Cooperative, appropriate mood and affect, normal judgment.    Assessment/Plan:  1. Enlarged lymph node in neck  New problem  Educated on the need of an ultrasound to help identify palpable mass in right lymph node.  - US-SOFT TISSUES OF HEAD - NECK; Future      Educated in proper administration of medication(s) ordered today including safety, possible SE, risks, benefits, rationale and alternatives to therapy.   Supportive care, differential diagnoses, and indications for immediate follow-up discussed with patient.    Pathogenesis of diagnosis discussed including typical length and natural progression.    Instructed to return to clinic or nearest emergency department for any change in condition, further concerns, or worsening of symptoms.  Patient states understanding of the plan of care and discharge instructions.    Return if symptoms worsen or fail to improve, for As needed.    Please note that this dictation was created using voice recognition software. I have made every reasonable attempt to correct obvious errors, but I expect that there are errors of grammar and possibly content that I did not discover before finalizing the note.

## 2020-07-30 NOTE — ASSESSMENT & PLAN NOTE
New to this examiner. States it has been like that for awhile. She states that it has always been noticeable to her, but unsure of when she first saw it. Last week she saw her allergist she was told to have it checked out. Denies sore throat, cough, fever or chills. Denies pain or tenderness.

## 2020-08-07 ENCOUNTER — HOSPITAL ENCOUNTER (OUTPATIENT)
Dept: RADIOLOGY | Facility: MEDICAL CENTER | Age: 70
End: 2020-08-07
Attending: NURSE PRACTITIONER
Payer: MEDICARE

## 2020-08-07 DIAGNOSIS — R59.0 ENLARGED LYMPH NODE IN NECK: ICD-10-CM

## 2020-08-07 PROCEDURE — 76536 US EXAM OF HEAD AND NECK: CPT

## 2020-08-10 DIAGNOSIS — R60.0 SWELLING OF RIGHT PAROTID GLAND: ICD-10-CM

## 2020-08-10 NOTE — PROGRESS NOTES
Called and spoke with patient regarding ultrasound results.  Will place urgent referral to ENT.  Patient in agreement.      Gisela Frankel M.D.

## 2020-09-22 ENCOUNTER — APPOINTMENT (OUTPATIENT)
Dept: RADIOLOGY | Facility: MEDICAL CENTER | Age: 70
End: 2020-09-22
Attending: OTOLARYNGOLOGY
Payer: MEDICARE

## 2020-09-24 ENCOUNTER — HOSPITAL ENCOUNTER (OUTPATIENT)
Dept: RADIOLOGY | Facility: MEDICAL CENTER | Age: 70
End: 2020-09-24
Attending: OTOLARYNGOLOGY
Payer: MEDICARE

## 2020-09-24 DIAGNOSIS — D37.030 NEOPLASM OF UNCERTAIN BEHAVIOR OF PAROTID GLAND: ICD-10-CM

## 2020-09-24 LAB — CYTOLOGY REG CYTOL: NORMAL

## 2020-09-24 PROCEDURE — 88305 TISSUE EXAM BY PATHOLOGIST: CPT

## 2020-09-24 PROCEDURE — 10005 FNA BX W/US GDN 1ST LES: CPT

## 2020-09-24 PROCEDURE — 88112 CYTOPATH CELL ENHANCE TECH: CPT

## 2020-09-24 NOTE — PROGRESS NOTES
"Patient given [facility] \"Preventing the Spread of Infection\" brochure upon arrival.   Procedure YONATHAN De Los Santos    US guided Right parotid biopsy fine needle aspiration done by Dr. Bah; Right aspect of neck access site; 1 jar of cytolyt, x 1 RPMI obtained and sent to pathology lab; pt tolerated the procedure well; pt hemodynamically stable pre/intra/post procedure; all questions and concerns answered prior to being d/c; patient provided with appropriate education for procedure; pt d/c home.  "

## 2021-01-22 ENCOUNTER — APPOINTMENT (OUTPATIENT)
Dept: RADIOLOGY | Facility: IMAGING CENTER | Age: 71
End: 2021-01-22
Attending: NURSE PRACTITIONER
Payer: MEDICARE

## 2021-01-22 ENCOUNTER — OFFICE VISIT (OUTPATIENT)
Dept: URGENT CARE | Facility: PHYSICIAN GROUP | Age: 71
End: 2021-01-22
Payer: MEDICARE

## 2021-01-22 VITALS
SYSTOLIC BLOOD PRESSURE: 124 MMHG | RESPIRATION RATE: 16 BRPM | DIASTOLIC BLOOD PRESSURE: 78 MMHG | HEIGHT: 63 IN | BODY MASS INDEX: 31.89 KG/M2 | OXYGEN SATURATION: 97 % | TEMPERATURE: 97.4 F | HEART RATE: 80 BPM | WEIGHT: 180 LBS

## 2021-01-22 DIAGNOSIS — M51.36 DDD (DEGENERATIVE DISC DISEASE), LUMBAR: ICD-10-CM

## 2021-01-22 DIAGNOSIS — M54.50 LUMBAR SPINE PAIN: ICD-10-CM

## 2021-01-22 DIAGNOSIS — M43.10 ANTEROLISTHESIS: ICD-10-CM

## 2021-01-22 DIAGNOSIS — S39.012A STRAIN OF LUMBAR REGION, INITIAL ENCOUNTER: ICD-10-CM

## 2021-01-22 PROCEDURE — 72100 X-RAY EXAM L-S SPINE 2/3 VWS: CPT | Mod: TC | Performed by: NURSE PRACTITIONER

## 2021-01-22 PROCEDURE — 99214 OFFICE O/P EST MOD 30 MIN: CPT | Performed by: NURSE PRACTITIONER

## 2021-01-22 RX ORDER — LIDOCAINE 50 MG/G
1 PATCH TOPICAL EVERY 24 HOURS
Qty: 10 PATCH | Refills: 0 | Status: SHIPPED | OUTPATIENT
Start: 2021-01-22 | End: 2021-06-21

## 2021-01-22 ASSESSMENT — ENCOUNTER SYMPTOMS
BACK PAIN: 1
FEVER: 0
WEAKNESS: 0
LEG PAIN: 0
TINGLING: 0
BOWEL INCONTINENCE: 0
PERIANAL NUMBNESS: 0

## 2021-01-22 ASSESSMENT — FIBROSIS 4 INDEX: FIB4 SCORE: 1.16

## 2021-01-23 NOTE — PROGRESS NOTES
Subjective:     Greta Tran is a 70 y.o. female who presents for Back Pain (low back pain, started this morning after walking her dog. she applied heat to her back and it made it worse. )      History of chronic back pain. Woke up with mid lower back pain. Heating pad increased pain. Concerned with application of heat causing a significant increase in pain. Describes pain as annoying. No medications taken for pain. No dysuria. No urinary symptoms.  No hx of cancer, prolonged steroid use. PCP next week.           Back Pain  This is a recurrent problem. The current episode started today. The problem occurs constantly. The problem has been rapidly worsening since onset. The pain is present in the lumbar spine. The pain is at a severity of 8/10. The pain is severe. The symptoms are aggravated by bending and position. Pertinent negatives include no bladder incontinence, bowel incontinence, dysuria, fever, leg pain, perianal numbness, tingling or weakness. Risk factors include obesity. She has tried heat for the symptoms. The treatment provided no relief.       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       Past Surgical History:   Procedure Laterality Date   • JOSELITO BY LAPAROSCOPY N/A 6/19/2015    Procedure: JOSELITO BY LAPAROSCOPY;  Surgeon: Grace Montgomery M.D.;  Location: SURGERY [facility];  Service:    • GYN SURGERY  1993    hysterectomy   • ABDOMINAL HYSTERECTOMY TOTAL     • NASAL POLYPECTOMY         Social History     Socioeconomic History   • Marital status:      Spouse name: Not on file   • Number of children: Not on file   • Years of education: Not on file   • Highest education level: Not on file   Occupational History   • Not on file   Social Needs   • Financial resource strain: Not on file   • Food insecurity     Worry: Not  on file     Inability: Not on file   • Transportation needs     Medical: Not on file     Non-medical: Not on file   Tobacco Use   • Smoking status: Former Smoker     Packs/day: 1.00     Years: 25.00     Pack years: 25.00     Types: Cigarettes     Quit date: 2000     Years since quittin.6   • Smokeless tobacco: Never Used   Substance and Sexual Activity   • Alcohol use: Yes     Comment: occasional    • Drug use: No   • Sexual activity: Yes     Partners: Male     Comment:     Lifestyle   • Physical activity     Days per week: Not on file     Minutes per session: Not on file   • Stress: Not on file   Relationships   • Social connections     Talks on phone: Not on file     Gets together: Not on file     Attends [employer] service: Not on file     Active member of club or organization: Not on file     Attends meetings of clubs or organizations: Not on file     Relationship status: Not on file   • Intimate partner violence     Fear of current or ex partner: Not on file     Emotionally abused: Not on file     Physically abused: Not on file     Forced sexual activity: Not on file   Other Topics Concern   • Not on file   Social History Narrative   • Not on file        Family History   Problem Relation Age of Onset   • Cancer Mother         lymphoma   • Stroke Father    • Hypertension Father    • Cancer Sister         colon   • Heart Disease Brother 50        CHF   • Other Daughter         Factor V   • No Known Problems Maternal Grandmother    • No Known Problems Maternal Grandfather    • No Known Problems Paternal Grandmother    • No Known Problems Paternal Grandfather    • Hypertension Sister    • Heart Disease Sister         stent   • Leukemia Brother    • Other Brother         Factor V   • No Known Problems Brother    • Heart Disease Brother         open heart surgery   • No Known Problems Brother    • Pulmonary Embolism Brother         Allergies   Allergen Reactions   • Cephalexin Nausea   •  "Fenofibrate      Tolerates brand name tricor   • Food      Raspberry, carrots, green pepper, white potatoes, tomato, pork, turkey, tree nuts (cashew, hazelnut, walnut)   • Levaquin Swelling     Shaky, throat swelling, joint pain       Review of Systems   Constitutional: Negative for fever.   Gastrointestinal: Negative for bowel incontinence.   Genitourinary: Negative for bladder incontinence and dysuria.   Musculoskeletal: Positive for back pain.   Skin: Negative for rash.   Neurological: Negative for tingling, focal weakness and weakness.   All other systems reviewed and are negative.       Objective:   /78   Pulse 80   Temp 36.3 °C (97.4 °F) (Temporal)   Resp 16   Ht 1.6 m (5' 3\")   Wt 81.6 kg (180 lb)   SpO2 97%   BMI 31.89 kg/m²     Physical Exam  Vitals signs reviewed.   Constitutional:       General: She is not in acute distress.     Appearance: She is well-developed.   HENT:      Head: Normocephalic and atraumatic.      Right Ear: External ear normal.      Left Ear: External ear normal.      Nose: Nose normal.   Eyes:      Conjunctiva/sclera: Conjunctivae normal.   Neck:      Musculoskeletal: Normal range of motion.   Cardiovascular:      Rate and Rhythm: Normal rate.   Pulmonary:      Effort: Pulmonary effort is normal.   Musculoskeletal:         General: No deformity.      Lumbar back: She exhibits no tenderness, no bony tenderness, no swelling, no edema, no deformity and no spasm.      Comments: No current TTP of lumbar area. Reported pain with ROM.  Patients points to midline lumbar sacral area as location of pain.     Skin:     General: Skin is warm and dry.      Findings: No bruising, erythema or rash.   Neurological:      General: No focal deficit present.      Mental Status: She is alert and oriented to person, place, and time.      GCS: GCS eye subscore is 4. GCS verbal subscore is 5. GCS motor subscore is 6.      Gait: Gait is intact.      Deep Tendon Reflexes:      Reflex Scores:       " Patellar reflexes are 1+ on the right side and 2+ on the left side.     Comments: Strong and equal lower extremity strength.    Psychiatric:         Mood and Affect: Mood normal.         Speech: Speech normal.         Behavior: Behavior normal.         Thought Content: Thought content normal.         Judgment: Judgment normal.         Assessment/Plan:   1. Strain of lumbar region, initial encounter  - lidocaine (LIDODERM) 5 % Patch; Place 1 Patch on the skin every 24 hours.  Dispense: 10 Patch; Refill: 0    2. Lumbar spine pain  - DX-LUMBAR SPINE-2 OR 3 VIEWS; Future    3. Anterolisthesis    4. DDD (degenerative disc disease), lumbar  IMPRESSION:   1.  No compression deformity or acute fracture is identified.   2.  Moderate degenerative disc disease and facet arthropathy.   3.  Anterolisthesis is noted at the L4-S1 levels.    -OTC NSAIDs for pain and inflammation.  -Can also take OTC Tylenol as directed for pain.   -Temperature Therapy: Heat or ice, whichever feels better.  -Gentle ROM back stretches and exercises. Resume activity as tolerated.    Follow up as planned with your primary care doctor. Initiate conservative measures, including OTC pain medications. Follow up for persistent, new, or worsening pain. Emergently for weakness, loss of bowel or bladder, groin pain or numbness, fevers.    Denies factor 5, states daughter has it. Hx of osteopenia in lumbar spine on previous imaging. Discussed imaging due to midline pain, age, and hx of osteopenia.     Differential diagnosis, natural history, supportive care, and indications for immediate follow-up discussed.

## 2021-01-25 ASSESSMENT — ENCOUNTER SYMPTOMS: FOCAL WEAKNESS: 0

## 2021-01-26 NOTE — PATIENT INSTRUCTIONS
Lumbosacral Strain  Lumbosacral strain is an injury that causes pain in the lower back (lumbosacral spine). This injury usually occurs from overstretching the muscles or ligaments along your spine. A strain can affect one or more muscles or cord-like tissues that connect bones to other bones (ligaments).  What are the causes?  This condition may be caused by:  · A hard, direct hit (blow) to the back.  · Excessive stretching of the lower back muscles. This may result from:  ? A fall.  ? Lifting something heavy.  ? Repetitive movements such as bending or crouching.  What increases the risk?  The following factors may increase your risk of getting this condition:  · Participating in sports or activities that involve:  ? A sudden twist of the back.  ? Pushing or pulling motions.  · Being overweight or obese.  · Having poor strength and flexibility, especially tight hamstrings or weak muscles in the back or abdomen.  · Having too much of a curve in the lower back.  · Having a pelvis that is tilted forward.  What are the signs or symptoms?  The main symptom of this condition is pain in the lower back, at the site of the strain. Pain may extend (radiate) down one or both legs.  How is this diagnosed?  This condition is diagnosed based on:  · Your symptoms.  · Your medical history.  · A physical exam.  ? Your health care provider may push on certain areas of your back to determine the source of your pain.  ? You may be asked to bend forward, backward, and side to side to assess the severity of your pain and your range of motion.  · Imaging tests, such as:  ? X-rays.  ? MRI.    How is this treated?  Treatment for this condition may include:  · Putting heat and cold on the affected area.  · Medicines to help relieve pain and relax your muscles (muscle relaxants).  · NSAIDs to help reduce swelling and discomfort.  When your symptoms improve, it is important to gradually return to your normal routine as soon as possible to  reduce pain, avoid stiffness, and avoid loss of muscle strength. Generally, symptoms should improve within 6 weeks of treatment. However, recovery time varies.  Follow these instructions at home:  Managing pain, stiffness, and swelling    · If directed, put ice on the injured area during the first 24 hours after your strain.  ? Put ice in a plastic bag.  ? Place a towel between your skin and the bag.  ? Leave the ice on for 20 minutes, 2-3 times a day.  · If directed, put heat on the affected area as often as told by your health care provider. Use the heat source that your health care provider recommends, such as a moist heat pack or a heating pad.  ? Place a towel between your skin and the heat source.  ? Leave the heat on for 20-30 minutes.  ? Remove the heat if your skin turns bright red. This is especially important if you are unable to feel pain, heat, or cold. You may have a greater risk of getting burned.  Activity  · Rest and return to your normal activities as told by your health care provider. Ask your health care provider what activities are safe for you.  · Avoid activities that take a lot of energy for as long as told by your health care provider.  General instructions  · Take over-the-counter and prescription medicines only as told by your health care provider.  · Do not drive or use heavy machinery while taking prescription pain medicine.  · Do not use any products that contain nicotine or tobacco, such as cigarettes and e-cigarettes. If you need help quitting, ask your health care provider.  · Keep all follow-up visits as told by your health care provider. This is important.  How is this prevented?  · Use correct form when playing sports and lifting heavy objects.  · Use good posture when sitting and standing.  · Maintain a healthy weight.  · Sleep on a mattress with medium firmness to support your back.  · Be safe and responsible while being active to avoid falls.  · Do at least 150 minutes of  moderate-intensity exercise each week, such as brisk walking or water aerobics. Try a form of exercise that takes stress off your back, such as swimming or stationary cycling.  · Maintain physical fitness, including:  ? Strength.  ? Flexibility.  ? Cardiovascular fitness.  ? Endurance.  Contact a health care provider if:  · Your back pain does not improve after 6 weeks of treatment.  · Your symptoms get worse.  Get help right away if:  · Your back pain is severe.  · You cannot stand or walk.  · You have difficulty controlling when you urinate or when you have a bowel movement.  · You feel nauseous or you vomit.  · Your feet get very cold.  · You have numbness, tingling, weakness, or problems using your arms or legs.  · You develop any of the following:  ? Shortness of breath.  ? Dizziness.  ? Pain in your legs.  ? Weakness in your buttocks or legs.  ? Discoloration of the skin on your toes or legs.  This information is not intended to replace advice given to you by your health care provider. Make sure you discuss any questions you have with your health care provider.  Document Released: 09/27/2006 Document Revised: 04/10/2020 Document Reviewed: 05/21/2017  Elsevier Patient Education © 2020 [employer]      Degenerative Disk Disease    Degenerative disk disease is a condition caused by changes that occur in the spinal disks as a person ages. Spinal disks are soft and compressible disks located between the bones of your spine (vertebrae). These disks act like shock absorbers.  Degenerative disk disease can affect the whole spine. However, the neck and lower back are most often affected. Many changes can occur in the spinal disks with aging, such as:  · The spinal disks may dry and shrink.  · Small tears may occur in the tough, outer covering of the disk (annulus).  · The disk space may become smaller due to loss of water.  · Abnormal growths in the bone (spurs) may occur. This can put pressure on the nerve roots  exiting the spinal canal, causing pain.  · The spinal canal may become narrowed.  What are the causes?  This condition may be caused by:  · Normal degeneration with age.  · Injuries.  · Certain activities and sports that cause damage.  What increases the risk?  The following factors may make you more likely to develop this condition:  · Being overweight.  · Having a family history of degenerative disk disease.  · Smoking.  · Sudden injury.  · Doing work that requires heavy lifting.  What are the signs or symptoms?  Symptoms of this condition include:  · Pain that varies in intensity. Some people have no pain, while others have severe pain. The location of the pain depends on the part of your backbone that is affected. You may have:  ? Pain in your neck or arm if a disk in your neck area is affected.  ? Pain in your back, buttocks, or legs if a disk in your lower back is affected.  · Pain that becomes worse while bending or reaching up, or with twisting movements.  · Pain that may start gradually and then get worse as time passes. It may also start after a major or minor injury.  · Numbness or tingling in the arms or legs.  How is this diagnosed?  This condition may be diagnosed based on:  · Your symptoms and medical history.  · A physical exam.  · Imaging tests, including:  ? An X-ray of the spine.  ? MRI.  How is this treated?  This condition may be treated with:  · Medicines.  · Rehabilitation exercises. These activities aim to strengthen muscles in your back and abdomen to better support your spine.  If treatments do not help to relieve your symptoms or you have severe pain, you may need surgery.  Follow these instructions at home:  Medicines  · Take over-the-counter and prescription medicines only as told by your health care provider.  · Do not drive or use heavy machinery while taking prescription pain medicine.  · If you are taking prescription pain medicine, take actions to prevent or treat constipation. Your  health care provider may recommend that you:  ? Drink enough fluid to keep your urine pale yellow.  ? Eat foods that are high in fiber, such as fresh fruits and vegetables, whole grains, and beans.  ? Limit foods that are high in fat and processed sugars, such as fried or sweet foods.  ? Take an over-the-counter or prescription medicine for constipation.  Activity  · Rest as told by your health care provider.  · Ask your health care provider what activities are safe for you. Return to your normal activities as directed.  · Avoid sitting for a long time without moving. Get up to take short walks every 1-2 hours. This is important to improve blood flow and breathing. Ask for help if you feel weak or unsteady.  · Perform relaxation exercises as told by your health care provider.  · Maintain good posture.  · Do not lift anything that is heavier than 10 lb (4.5 kg), or the limit that you are told, until your health care provider says that it is safe.  · Follow proper lifting and walking techniques as told by your health care provider.  Managing pain, stiffness, and swelling    · If directed, put ice on the painful area. Icing can help to relieve pain.  ? Put ice in a plastic bag.  ? Place a towel between your skin and the bag.  ? Leave the ice on for 20 minutes, 2-3 times a day.  · If directed, apply heat to the painful area as often as told by your health care provider. Heat can reduce the stiffness of your muscles. Use the heat source that your health care provider recommends, such as a moist heat pack or a heating pad.  ? Place a towel between your skin and the heat source.  ? Leave the heat on for 20-30 minutes.  ? Remove the heat if your skin turns bright red. This is especially important if you are unable to feel pain, heat, or cold. You may have a greater risk of getting burned.  General instructions  · Change your sitting, standing, and sleeping habits as told by your health care provider.  · Avoid sitting in the  same position for long periods of time. Change positions frequently.  · Lose weight or maintain a healthy weight as told by your health care provider.  · Do not use any products that contain nicotine or tobacco, such as cigarettes and e-cigarettes. If you need help quitting, ask your health care provider.  · Wear supportive footwear.  · Keep all follow-up visits as told by your health care provider. This is important. This may include visits for physical therapy.  Contact a health care provider if you:  · Have pain that does not go away within 1-4 weeks.  · Lose your appetite.  · Lose weight without trying.  Get help right away if you:  · Have severe pain.  · Notice weakness in your arms, hands, or legs.  · Begin to lose control of your bladder or bowel movements.  · Have fevers or night sweats.  Summary  · Degenerative disk disease is a condition caused by changes that occur in the spinal disks as a person ages.  · Degenerative disk disease can affect the whole spine. However, the neck and lower back are most often affected.  · Take over-the-counter and prescription medicines only as told by your health care provider.  This information is not intended to replace advice given to you by your health care provider. Make sure you discuss any questions you have with your health care provider.  Document Released: 10/14/2008 Document Revised: 12/13/2018 Document Reviewed: 12/13/2018  Elsevier Patient Education © 2020 [employer]

## 2021-02-03 ENCOUNTER — TELEPHONE (OUTPATIENT)
Dept: MEDICAL GROUP | Facility: PHYSICIAN GROUP | Age: 71
End: 2021-02-03

## 2021-02-03 NOTE — TELEPHONE ENCOUNTER
FINAL PRIOR AUTHORIZATION STATUS:    1.  Name of Medication & Dose: Tricor     2. Prior Auth Status: Approved through 1 year     3. Action Taken: Pharmacy Notified: N\A Patient Notified: N\A

## 2021-02-05 ENCOUNTER — PATIENT MESSAGE (OUTPATIENT)
Dept: MEDICAL GROUP | Facility: PHYSICIAN GROUP | Age: 71
End: 2021-02-05

## 2021-02-08 ENCOUNTER — TELEPHONE (OUTPATIENT)
Dept: MEDICAL GROUP | Facility: PHYSICIAN GROUP | Age: 71
End: 2021-02-08

## 2021-02-08 NOTE — TELEPHONE ENCOUNTER
DOCUMENTATION OF PAR STATUS:    1. Name of Medication & Dose: TRICOR     2. Name of Prescription Coverage Company & phone #: [employer]    3. Date Prior Auth Submitted: 02/08/2021    4. What information was given to obtain insurance decision? Tried and failed    5. Prior Auth Status? Pending    6. Patient Notified: N\A    Greta Tran (Key: [id number])   [employer] is reviewing your PA request. Typically an electronic response will be received within 72 hours. To check for an update later, open this request from your dashboard.    You may close this dialog and return to your dashboard to perform other tasks.

## 2021-02-08 NOTE — TELEPHONE ENCOUNTER
FINAL PRIOR AUTHORIZATION STATUS:    1.  Name of Medication & Dose:Tricor 145 mg    2. Prior Auth Status: Approved through 12/31/2021     3. Action Taken: Pharmacy Notified: yes Patient Notified: yes    Approved today  Request Reference Number: [id number]. TRICOR TAB 145MG is approved through 12/31/2021. Your patient may now fill this prescription and it will be covered.

## 2021-03-03 DIAGNOSIS — Z23 NEED FOR VACCINATION: ICD-10-CM

## 2021-05-02 DIAGNOSIS — E78.2 MIXED HYPERLIPIDEMIA: ICD-10-CM

## 2021-05-03 RX ORDER — FENOFIBRATE 145 MG/1
TABLET ORAL
Qty: 90 TABLET | Refills: 3 | Status: SHIPPED | OUTPATIENT
Start: 2021-05-03 | End: 2021-08-05 | Stop reason: SDUPTHER

## 2021-05-03 NOTE — TELEPHONE ENCOUNTER
Requested Prescriptions     Pending Prescriptions Disp Refills   • TRICOR 145 MG Tab [Pharmacy Med Name: TRICOR 145MG TABLETS] 90 tablet 3     Sig: TAKE 1 TABLET BY MOUTH EVERY DAY       SHANNAN Almazan.

## 2021-05-03 NOTE — TELEPHONE ENCOUNTER
Received request via: Pharmacy    Was the patient seen in the last year in this department? Yes  1/22/21    Does the patient have an active prescription (recently filled or refills available) for medication(s) requested? No

## 2021-06-14 ENCOUNTER — OFFICE VISIT (OUTPATIENT)
Dept: URGENT CARE | Facility: PHYSICIAN GROUP | Age: 71
End: 2021-06-14
Payer: MEDICARE

## 2021-06-14 ENCOUNTER — HOSPITAL ENCOUNTER (OUTPATIENT)
Dept: RADIOLOGY | Facility: MEDICAL CENTER | Age: 71
End: 2021-06-14
Attending: PHYSICIAN ASSISTANT
Payer: MEDICARE

## 2021-06-14 VITALS
TEMPERATURE: 98.3 F | RESPIRATION RATE: 16 BRPM | WEIGHT: 185 LBS | SYSTOLIC BLOOD PRESSURE: 128 MMHG | OXYGEN SATURATION: 95 % | DIASTOLIC BLOOD PRESSURE: 82 MMHG | BODY MASS INDEX: 32.78 KG/M2 | HEART RATE: 87 BPM | HEIGHT: 63 IN

## 2021-06-14 DIAGNOSIS — G89.29 CHRONIC LEFT SHOULDER PAIN: ICD-10-CM

## 2021-06-14 DIAGNOSIS — G89.29 CHRONIC LEFT-SIDED THORACIC BACK PAIN: ICD-10-CM

## 2021-06-14 DIAGNOSIS — M54.6 CHRONIC LEFT-SIDED THORACIC BACK PAIN: ICD-10-CM

## 2021-06-14 DIAGNOSIS — M19.012 OSTEOARTHRITIS OF LEFT SHOULDER, UNSPECIFIED OSTEOARTHRITIS TYPE: ICD-10-CM

## 2021-06-14 DIAGNOSIS — M25.512 CHRONIC LEFT SHOULDER PAIN: ICD-10-CM
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
## 2021-06-14 PROCEDURE — 99214 OFFICE O/P EST MOD 30 MIN: CPT | Performed by: PHYSICIAN ASSISTANT

## 2021-06-14 PROCEDURE — 73030 X-RAY EXAM OF SHOULDER: CPT | Mod: LT

## 2021-06-14 ASSESSMENT — FIBROSIS 4 INDEX: FIB4 SCORE: 1.18
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
## 2021-06-15 ASSESSMENT — ENCOUNTER SYMPTOMS
NECK PAIN: 0
CHILLS: 0
TINGLING: 0
DIZZINESS: 0
FOCAL WEAKNESS: 0
EYES NEGATIVE: 1
FEVER: 0
SENSORY CHANGE: 0
VOMITING: 0
SHORTNESS OF BREATH: 0
MYALGIAS: 1
HEADACHES: 0
COUGH: 0
ABDOMINAL PAIN: 0
BACK PAIN: 1

## 2021-06-21 ENCOUNTER — OFFICE VISIT (OUTPATIENT)
Dept: MEDICAL GROUP | Facility: PHYSICIAN GROUP | Age: 71
End: 2021-06-21
Payer: MEDICARE

## 2021-06-21 VITALS
TEMPERATURE: 97.8 F | BODY MASS INDEX: 34.2 KG/M2 | SYSTOLIC BLOOD PRESSURE: 130 MMHG | HEART RATE: 78 BPM | WEIGHT: 193 LBS | HEIGHT: 63 IN | DIASTOLIC BLOOD PRESSURE: 86 MMHG | OXYGEN SATURATION: 92 %

## 2021-06-21 DIAGNOSIS — N95.1 MENOPAUSAL STATE: ICD-10-CM

## 2021-06-21 DIAGNOSIS — M19.012 OSTEOARTHRITIS OF LEFT SHOULDER, UNSPECIFIED OSTEOARTHRITIS TYPE: ICD-10-CM

## 2021-06-21 DIAGNOSIS — E66.9 OBESITY (BMI 30-39.9): ICD-10-CM

## 2021-06-21 DIAGNOSIS — R59.0 ENLARGED LYMPH NODE IN NECK: ICD-10-CM

## 2021-06-21 DIAGNOSIS — M85.89 OSTEOPENIA OF MULTIPLE SITES: ICD-10-CM

## 2021-06-21 DIAGNOSIS — Z00.00 ROUTINE HEALTH MAINTENANCE: ICD-10-CM

## 2021-06-21 DIAGNOSIS — Z78.0 POSTMENOPAUSAL STATUS (AGE-RELATED) (NATURAL): ICD-10-CM

## 2021-06-21 DIAGNOSIS — E78.2 MIXED HYPERLIPIDEMIA: ICD-10-CM

## 2021-06-21 PROCEDURE — 99214 OFFICE O/P EST MOD 30 MIN: CPT | Performed by: NURSE PRACTITIONER

## 2021-06-21 ASSESSMENT — FIBROSIS 4 INDEX: FIB4 SCORE: 1.18

## 2021-06-21 NOTE — ASSESSMENT & PLAN NOTE
Chronic, ongoing.  Continues brand-name only TriCor 145 mg/day as directed.  Due for updated labs.

## 2021-06-21 NOTE — ASSESSMENT & PLAN NOTE
Chronic, ongoing.  Patient reports that she is walking every morning and uses her bike/elliptical machine every morning.  States that she has been eating good.  Started over-the-counter apple cider vinegar chews that help with appetite suppressant.  Due for updated labs.
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
## 2021-06-21 NOTE — ASSESSMENT & PLAN NOTE
Chronic, ongoing.  Continues vitamin D and calcium supplement daily.  Continues weightbearing exercises daily.  Due for updated DEXA.

## 2021-06-21 NOTE — ASSESSMENT & PLAN NOTE
Chronic, stable.  Patient was evaluated by ENT and had a biopsy of enlarged lymph node in neck.  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  Patient states that she does not have follow-up with ENT, but was told that it may need to be drained in the future if it fills.  Denies sore throat, cough, fever or chills.  Denies pain or tenderness to the area.
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
## 2021-07-15 ENCOUNTER — HOSPITAL ENCOUNTER (OUTPATIENT)
Dept: LAB | Facility: MEDICAL CENTER | Age: 71
End: 2021-07-15
Attending: NURSE PRACTITIONER
Payer: MEDICARE

## 2021-07-15 DIAGNOSIS — Z00.00 ROUTINE HEALTH MAINTENANCE: ICD-10-CM

## 2021-07-15 DIAGNOSIS — E78.2 MIXED HYPERLIPIDEMIA: ICD-10-CM

## 2021-07-15 DIAGNOSIS — R59.0 ENLARGED LYMPH NODE IN NECK: ICD-10-CM

## 2021-07-15 DIAGNOSIS — E66.9 OBESITY (BMI 30-39.9): ICD-10-CM

## 2021-07-15 LAB
ALBUMIN SERPL BCP-MCNC: 4.3 G/DL (ref 3.2–4.9)
ALBUMIN/GLOB SERPL: 1.3 G/DL
ALP SERPL-CCNC: 47 U/L (ref 30–99)
ALT SERPL-CCNC: 19 U/L (ref 2–50)
ANION GAP SERPL CALC-SCNC: 12 MMOL/L (ref 7–16)
AST SERPL-CCNC: 26 U/L (ref 12–45)
BASOPHILS # BLD AUTO: 0.6 % (ref 0–1.8)
BASOPHILS # BLD: 0.03 K/UL (ref 0–0.12)
BILIRUB SERPL-MCNC: 0.4 MG/DL (ref 0.1–1.5)
BUN SERPL-MCNC: 15 MG/DL (ref 8–22)
CALCIUM SERPL-MCNC: 9.5 MG/DL (ref 8.5–10.5)
CHLORIDE SERPL-SCNC: 106 MMOL/L (ref 96–112)
CHOLEST SERPL-MCNC: 126 MG/DL (ref 100–199)
CO2 SERPL-SCNC: 25 MMOL/L (ref 20–33)
CREAT SERPL-MCNC: 0.68 MG/DL (ref 0.5–1.4)
EOSINOPHIL # BLD AUTO: 0.21 K/UL (ref 0–0.51)
EOSINOPHIL NFR BLD: 4.3 % (ref 0–6.9)
ERYTHROCYTE [DISTWIDTH] IN BLOOD BY AUTOMATED COUNT: 45.7 FL (ref 35.9–50)
FASTING STATUS PATIENT QL REPORTED: NORMAL
GLOBULIN SER CALC-MCNC: 3.2 G/DL (ref 1.9–3.5)
GLUCOSE SERPL-MCNC: 93 MG/DL (ref 65–99)
HCT VFR BLD AUTO: 44.9 % (ref 37–47)
HDLC SERPL-MCNC: 35 MG/DL
HGB BLD-MCNC: 14.5 G/DL (ref 12–16)
IMM GRANULOCYTES # BLD AUTO: 0.01 K/UL (ref 0–0.11)
IMM GRANULOCYTES NFR BLD AUTO: 0.2 % (ref 0–0.9)
LDLC SERPL CALC-MCNC: 68 MG/DL
LYMPHOCYTES # BLD AUTO: 1.53 K/UL (ref 1–4.8)
LYMPHOCYTES NFR BLD: 31.2 % (ref 22–41)
MCH RBC QN AUTO: 31.3 PG (ref 27–33)
MCHC RBC AUTO-ENTMCNC: 32.3 G/DL (ref 33.6–35)
MCV RBC AUTO: 96.8 FL (ref 81.4–97.8)
MONOCYTES # BLD AUTO: 0.38 K/UL (ref 0–0.85)
MONOCYTES NFR BLD AUTO: 7.7 % (ref 0–13.4)
NEUTROPHILS # BLD AUTO: 2.75 K/UL (ref 2–7.15)
NEUTROPHILS NFR BLD: 56 % (ref 44–72)
NRBC # BLD AUTO: 0 K/UL
NRBC BLD-RTO: 0 /100 WBC
PLATELET # BLD AUTO: 242 K/UL (ref 164–446)
PMV BLD AUTO: 9.9 FL (ref 9–12.9)
POTASSIUM SERPL-SCNC: 4.2 MMOL/L (ref 3.6–5.5)
PROT SERPL-MCNC: 7.5 G/DL (ref 6–8.2)
RBC # BLD AUTO: 4.64 M/UL (ref 4.2–5.4)
SODIUM SERPL-SCNC: 143 MMOL/L (ref 135–145)
TRIGL SERPL-MCNC: 114 MG/DL (ref 0–149)
TSH SERPL DL<=0.005 MIU/L-ACNC: 1.77 UIU/ML (ref 0.38–5.33)
WBC # BLD AUTO: 4.9 K/UL (ref 4.8–10.8)

## 2021-07-15 PROCEDURE — 85025 COMPLETE CBC W/AUTO DIFF WBC: CPT

## 2021-07-15 PROCEDURE — 36415 COLL VENOUS BLD VENIPUNCTURE: CPT

## 2021-07-15 PROCEDURE — 80061 LIPID PANEL: CPT

## 2021-07-15 PROCEDURE — 80053 COMPREHEN METABOLIC PANEL: CPT

## 2021-07-15 PROCEDURE — 84443 ASSAY THYROID STIM HORMONE: CPT

## 2021-07-27 ENCOUNTER — OFFICE VISIT (OUTPATIENT)
Dept: MEDICAL GROUP | Facility: PHYSICIAN GROUP | Age: 71
End: 2021-07-27
Payer: MEDICARE

## 2021-07-27 VITALS
DIASTOLIC BLOOD PRESSURE: 82 MMHG | OXYGEN SATURATION: 96 % | HEIGHT: 63 IN | TEMPERATURE: 97.2 F | HEART RATE: 87 BPM | SYSTOLIC BLOOD PRESSURE: 124 MMHG | WEIGHT: 190 LBS | BODY MASS INDEX: 33.66 KG/M2

## 2021-07-27 DIAGNOSIS — Z00.00 MEDICARE ANNUAL WELLNESS VISIT, SUBSEQUENT: ICD-10-CM

## 2021-07-27 DIAGNOSIS — J45.20 MILD INTERMITTENT ASTHMA WITHOUT COMPLICATION: ICD-10-CM

## 2021-07-27 DIAGNOSIS — M85.89 OSTEOPENIA OF MULTIPLE SITES: ICD-10-CM

## 2021-07-27 DIAGNOSIS — E78.2 MIXED HYPERLIPIDEMIA: ICD-10-CM

## 2021-07-27 DIAGNOSIS — E66.9 OBESITY (BMI 30-39.9): ICD-10-CM

## 2021-07-27 DIAGNOSIS — Z00.00 ROUTINE HEALTH MAINTENANCE: ICD-10-CM

## 2021-07-27 PROCEDURE — G0439 PPPS, SUBSEQ VISIT: HCPCS | Performed by: NURSE PRACTITIONER

## 2021-07-27 RX ORDER — ALBUTEROL SULFATE 90 UG/1
2 AEROSOL, METERED RESPIRATORY (INHALATION) EVERY 4 HOURS PRN
COMMUNITY
Start: 2021-07-14 | End: 2022-04-12

## 2021-07-27 ASSESSMENT — ENCOUNTER SYMPTOMS: GENERAL WELL-BEING: GOOD

## 2021-07-27 ASSESSMENT — ACTIVITIES OF DAILY LIVING (ADL): BATHING_REQUIRES_ASSISTANCE: 0

## 2021-07-27 ASSESSMENT — PATIENT HEALTH QUESTIONNAIRE - PHQ9: CLINICAL INTERPRETATION OF PHQ2 SCORE: 0

## 2021-07-27 ASSESSMENT — FIBROSIS 4 INDEX: FIB4 SCORE: 1.7

## 2021-07-27 NOTE — ASSESSMENT & PLAN NOTE
Chronic, ongoing.  Patient reports that she is walking every morning and uses her bike/elliptical machine every morning.  States that she has been eating good. Continues over-the-counter apple cider vinegar chews that help with appetite suppressant.  Due for updated labs in July 2022.

## 2021-07-27 NOTE — ASSESSMENT & PLAN NOTE
"Chronic, ongoing. Continues to see allergy/asthma specialist every 6 months. Continues advair 1 puff twice a day, does not need the albuterol often, only \"once in a great while\".   "

## 2021-07-27 NOTE — ASSESSMENT & PLAN NOTE
Chronic, ongoing. Continues vitamin d and calcium supplement daily. Continues weight bearing exercises daily. Scheduled for DEXA scan on 8/4/2021.

## 2021-07-27 NOTE — PROGRESS NOTES
"Chief Complaint   Patient presents with   • Annual Wellness Visit     HPI:  Greta Tran is a 71 y.o. here for Medicare Annual Wellness Visit     Mixed hyperlipidemia  Chronic, ongoing. Reports that her diet is pretty healthy. Continues to exercise by walking regularly and using her elliptical 30-35 minutes every morning. Continues Tricor 145 mg/day, brand name only, denies side effects of the medication. Due for annual labs in July 2022.     Obesity (BMI 30-39.9)  Chronic, ongoing.  Patient reports that she is walking every morning and uses her bike/elliptical machine every morning.  States that she has been eating good. Continues over-the-counter apple cider vinegar chews that help with appetite suppressant.  Due for updated labs in July 2022.    Mild intermittent asthma without complication  Chronic, ongoing. Continues to see allergy/asthma specialist every 6 months. Continues advair 1 puff twice a day, does not need the albuterol often, only \"once in a great while\".     Osteopenia of multiple sites  Chronic, ongoing. Continues vitamin d and calcium supplement daily. Continues weight bearing exercises daily. Scheduled for DEXA scan on 8/4/2021.     Patient Active Problem List    Diagnosis Date Noted   • Osteoarthritis of left shoulder 06/21/2021   • Enlarged lymph node in neck 07/30/2020   • Obesity (BMI 30-39.9) 01/08/2020   • Osteopenia of multiple sites 05/08/2019   • Mild intermittent asthma without complication 01/23/2017   • Multiple allergies 01/23/2017   • Mixed hyperlipidemia 01/23/2017     Current Outpatient Medications   Medication Sig Dispense Refill   • albuterol 108 (90 Base) MCG/ACT Aero Soln inhalation aerosol Inhale 2 Puffs every four hours as needed.     • diclofenac sodium 1 % Gel Apply 2 grams to affected area no more than four times per day 100 g 0   • TRICOR 145 MG Tab TAKE 1 TABLET BY MOUTH EVERY DAY 90 tablet 3   • Calcium Carbonate-Vitamin D (CALTRATE 600+D PO) Take  by mouth.   "   • Multiple Vitamins-Minerals (CENTRUM SILVER) Chew Tab Take  by mouth.     • fluticasone-salmeterol (ADVAIR) 250-50 MCG/DOSE AEROSOL POWDER, BREATH ACTIVATED Inhale 1 Puff by mouth every 12 hours.     • fluticasone (FLONASE) 50 MCG/ACT nasal spray Spray 1 Spray in nose every day.     • fexofenadine (ALLEGRA) 60 MG TABS Take 60 mg by mouth every day.       No current facility-administered medications for this visit.          Current supplements as per medication list.     Allergies: Cephalexin, Fenofibrate, Food, and Levaquin    Current social contact/activities: none, going back to work next week.    She  reports that she quit smoking about 21 years ago. Her smoking use included cigarettes. She started smoking about 46 years ago. She has a 25.00 pack-year smoking history. She has never used smokeless tobacco. She reports current alcohol use. She reports that she does not use drugs.  Counseling given: Yes    DPA/Advanced Directive:  Patient does not have an Advanced Directive.  A packet and workshop information was given on Advanced Directives.    ROS:    Gait: Uses no assistive device  Ostomy: No  Other tubes: No  Amputations: No  Chronic oxygen use: No  Last eye exam: 2019  Wears hearing aids: No   : Reports urinary leakage during the last 6 months that has not interfered at all with their daily activities or sleep.    Screening:    Depression Screening    Little interest or pleasure in doing things?  0 - not at all  Feeling down, depressed , or hopeless? 0 - not at all  Patient Health Questionnaire Score: 0     If depressive symptoms identified deferred to follow up visit unless specifically addressed in assessment and plan.    Interpretation of PHQ-9 Total Score   Score Severity   1-4 No Depression   5-9 Mild Depression   10-14 Moderate Depression   15-19 Moderately Severe Depression   20-27 Severe Depression    Screening for Cognitive Impairment    Three Minute Recall (captain, garden, picture) 3/3    Nate  clock face with all 12 numbers and set the hands to show 5 past 8.  No    Cognitive concerns identified deferred for follow up unless specifically addressed in assessment and plan.    Fall Risk Assessment    Has the patient had two or more falls in the last year or any fall with injury in the last year?  No    Safety Assessment    Throw rugs on floor.  No  Handrails on all stairs.  Yes  Good lighting in all hallways.  Yes  Difficulty hearing.  No  Patient counseled about all safety risks that were identified.    Functional Assessment ADLs    Are there any barriers preventing you from cooking for yourself or meeting nutritional needs?  No.    Are there any barriers preventing you from driving safely or obtaining transportation?  No.    Are there any barriers preventing you from using a telephone or calling for help?  No.    Are there any barriers preventing you from shopping?  No.    Are there any barriers preventing you from taking care of your own finances?  No.    Are there any barriers preventing you from managing your medications?  No.    Are there any barriers preventing you from showering, bathing or dressing yourself?  No.    Are you currently engaging in any exercise or physical activity?  Yes.     What is your perception of your health?  Good.    Health Maintenance Summary                COVID-19 Vaccine Overdue 5/3/1962     BONE DENSITY Overdue 5/8/2021      Done 5/8/2019 DS-BONE DENSITY STUDY (DEXA)    IMM INFLUENZA Next Due 9/1/2021      Done 11/16/2020 Imm Admin: Influenza Vaccine Adult HD     Patient has more history with this topic...    MAMMOGRAM Next Due 9/22/2021      Done 9/22/2020 [id number]     Patient has more history with this topic...    Annual Wellness Visit Next Due 7/28/2022      Done 7/27/2021 Visit Dx: Medicare annual wellness visit, subsequent     Patient has more history with this topic...    COLONOSCOPY Next Due 6/24/2023      Done 6/24/2020 REFERRAL TO GI FOR COLONOSCOPY      Patient has more history with this topic...    IMM DTaP/Tdap/Td Vaccine Next Due 2029      Done 2019 Imm Admin: Tdap Vaccine        Patient Care Team:  LESLYE Almazan as PCP - General (Family Medicine)  Sapphire White, PT, MSPT as Physical Therapist (Physical Therapy)    Social History     Tobacco Use   • Smoking status: Former Smoker     Packs/day: 1.00     Years: 25.00     Pack years: 25.00     Types: Cigarettes     Start date: 1975     Quit date: 2000     Years since quittin.1   • Smokeless tobacco: Never Used   Vaping Use   • Vaping Use: Never assessed   Substance Use Topics   • Alcohol use: Yes     Comment: occasional    • Drug use: No     Family History   Problem Relation Age of Onset   • Cancer Mother         lymphoma   • Stroke Father    • Hypertension Father    • Cancer Sister         colon   • Heart Disease Brother 50        CHF   • Other Daughter         Factor V   • No Known Problems Maternal Grandmother    • No Known Problems Maternal Grandfather    • No Known Problems Paternal Grandmother    • No Known Problems Paternal Grandfather    • Hypertension Sister    • Heart Disease Sister         stent   • Leukemia Brother    • Other Brother         Factor V   • No Known Problems Brother    • Heart Disease Brother         open heart surgery   • No Known Problems Brother    • Pulmonary Embolism Brother      She  has a past medical history of Arthritis, ASTHMA, Breath shortness, Factor 5 Leiden mutation, heterozygous (HCC), Hypercholesterolemia, and Warthin's tumor (2020).     Past Surgical History:   Procedure Laterality Date   • JOSELITO BY LAPAROSCOPY N/A 2015    Procedure: JOSELITO BY LAPAROSCOPY;  Surgeon: Grace Montgomery M.D.;  Location: SURGERY [facility];  Service:    • GYN SURGERY      hysterectomy   • ABDOMINAL HYSTERECTOMY TOTAL     • NASAL POLYPECTOMY       Exam:   /82 (BP Location: Left arm, Patient Position: Sitting, BP Cuff Size: Adult)   " Pulse 87   Temp 36.2 °C (97.2 °F) (Temporal)   Ht 1.6 m (5' 3\")   Wt 86.2 kg (190 lb)   SpO2 96%  Body mass index is 33.66 kg/m².    Hearing good.    Dentition fair  Alert, oriented in no acute distress.  Eye contact is good, speech goal directed, affect calm    Assessment and Plan. The following treatment and monitoring plan is recommended:    1. Medicare annual wellness visit, subsequent  2. Routine health maintenance  Due for annual labs in July 2022.  - CBC WITH DIFFERENTIAL; Future  - Comp Metabolic Panel; Future  - Lipid Profile; Future  - TSH WITH REFLEX TO FT4; Future    3. Mixed hyperlipidemia  Chronic, ongoing.  Continue brand-name TriCor 145 mg daily, does not need refill at this time.  Due for annual labs in July 2022.  - Comp Metabolic Panel; Future  - Lipid Profile; Future    4. Obesity (BMI 30-39.9)  Chronic, ongoing.  Encouraged continued healthy diet, regular exercise, and ongoing weight loss.  Due for annual labs in July 2022.  - CBC WITH DIFFERENTIAL; Future  - Comp Metabolic Panel; Future  - Lipid Profile; Future  - TSH WITH REFLEX TO FT4; Future    5. Mild intermittent asthma without complication  Chronic, ongoing.  Continue to follow with allergy/asthma specialist every 6 months.  Continue Advair and albuterol as directed by specialist.  Due for annual labs in July 2022.  - CBC WITH DIFFERENTIAL; Future  - TSH WITH REFLEX TO FT4; Future    6. Osteopenia of multiple sites  Chronic, ongoing.  Scheduled for DEXA scan on 8/4/2021.  Continue over-the-counter vitamin D and calcium supplement daily.  Due for annual labs in July 2022.  - CBC WITH DIFFERENTIAL; Future  - Comp Metabolic Panel; Future  - TSH WITH REFLEX TO FT4; Future     Services suggested: No services needed at this time  Health Care Screening: Age-appropriate preventive services recommended by USPTF and ACIP covered by Medicare were discussed today. Services ordered if indicated and agreed upon by the patient.  Referrals offered: " Community-based lifestyle interventions to reduce health risks and promote self-management and wellness, fall prevention, nutrition, physical activity, tobacco-use cessation, weight loss, and mental health services as per orders if indicated.    Discussion today about general wellness and lifestyle habits:    · Prevent falls and reduce trip hazards; Cautioned about securing or removing rugs.  · Have a working fire alarm and carbon monoxide detector;   · Engage in regular physical activity and social activities     Follow-up: Return in about 1 year (around 7/27/2022) for HC/PCP Annual Wellness Visit- Medicare, Follow up Labs.     Please note that this dictation was created using voice recognition software. I have worked with consultants from the vendor as well as technical experts from [employer] [employer] to optimize the interface. I have made every reasonable attempt to correct obvious errors, but I expect that there are errors of grammar and possibly content that I did not discover before finalizing the note.

## 2021-07-27 NOTE — ASSESSMENT & PLAN NOTE
Chronic, ongoing. Reports that her diet is pretty healthy. Continues to exercise by walking regularly and using her elliptical 30-35 minutes every morning. Continues Tricor 145 mg/day, brand name only, denies side effects of the medication. Due for annual labs in July 2022.

## 2021-08-04 ENCOUNTER — HOSPITAL ENCOUNTER (OUTPATIENT)
Dept: RADIOLOGY | Facility: MEDICAL CENTER | Age: 71
End: 2021-08-04
Attending: NURSE PRACTITIONER
Payer: MEDICARE

## 2021-08-04 DIAGNOSIS — N95.1 MENOPAUSAL STATE: ICD-10-CM

## 2021-08-04 DIAGNOSIS — M85.89 OSTEOPENIA OF MULTIPLE SITES: ICD-10-CM

## 2021-08-04 DIAGNOSIS — Z78.0 POSTMENOPAUSAL STATUS (AGE-RELATED) (NATURAL): ICD-10-CM

## 2021-08-04 DIAGNOSIS — Z00.00 ROUTINE HEALTH MAINTENANCE: ICD-10-CM

## 2021-08-04 PROCEDURE — 77080 DXA BONE DENSITY AXIAL: CPT

## 2021-08-05 DIAGNOSIS — E78.2 MIXED HYPERLIPIDEMIA: ICD-10-CM

## 2021-08-05 RX ORDER — FENOFIBRATE 145 MG/1
145 TABLET ORAL
Qty: 90 TABLET | Refills: 0 | Status: SHIPPED | OUTPATIENT
Start: 2021-08-05 | End: 2022-04-12 | Stop reason: SDUPTHER

## 2021-08-05 NOTE — TELEPHONE ENCOUNTER
Requested Prescriptions     Signed Prescriptions Disp Refills   • TRICOR 145 MG Tab 90 tablet 0     Sig: Take 1 tablet by mouth every day.     Authorizing Provider: PASCUAL JAFFE A.P.R.N.

## 2022-01-24 ENCOUNTER — OFFICE VISIT (OUTPATIENT)
Dept: MEDICAL GROUP | Facility: PHYSICIAN GROUP | Age: 72
End: 2022-01-24
Payer: MEDICARE

## 2022-01-24 VITALS
TEMPERATURE: 97.6 F | DIASTOLIC BLOOD PRESSURE: 76 MMHG | WEIGHT: 180 LBS | BODY MASS INDEX: 31.89 KG/M2 | HEIGHT: 63 IN | OXYGEN SATURATION: 94 % | HEART RATE: 83 BPM | SYSTOLIC BLOOD PRESSURE: 150 MMHG

## 2022-01-24 DIAGNOSIS — M79.672 LEFT FOOT PAIN: ICD-10-CM

## 2022-01-24 PROCEDURE — 99213 OFFICE O/P EST LOW 20 MIN: CPT | Performed by: NURSE PRACTITIONER

## 2022-01-24 ASSESSMENT — FIBROSIS 4 INDEX: FIB4 SCORE: 1.7

## 2022-01-24 ASSESSMENT — PATIENT HEALTH QUESTIONNAIRE - PHQ9: CLINICAL INTERPRETATION OF PHQ2 SCORE: 0

## 2022-01-25 NOTE — ASSESSMENT & PLAN NOTE
New to examiner.  Patient reports that on 1/13/2022 she had new top of left foot pain that lasted for about 3 days, with the pain traveling to her toes and then to the arch of her left foot.  She did have some swelling on the top of her foot and ankles.  Currently, the pain has resolved.  She does have bilateral lower extremity edema, left worse than right, but patient states that her left leg has always been larger than her right.  Patient reports that she drinks a lot of water daily, and does not eat excessive sodium.  She is on her feet 6 hours a day while at work and regularly walking throughout the day in the kitchen at work.  She does note that the swelling in bilateral lower extremities does improve in the morning when she wakes up. The patient denies chest pain, shortness of breath, headaches, dizziness, blurry vision, or dyspnea on exertion.

## 2022-01-25 NOTE — PROGRESS NOTES
CC:   Chief Complaint   Patient presents with   • Foot Pain     left, resolved     HISTORY OF THE PRESENT ILLNESS: Patient is a 71 y.o. female. This pleasant patient is here today to discuss left foot pain that started on 1/13/2022, currently resolved.    Health Maintenance: Completed    Left foot pain  New to examiner.  Patient reports that on 1/13/2022 she had new top of left foot pain that lasted for about 3 days, with the pain traveling to her toes and then to the arch of her left foot.  She did have some swelling on the top of her foot and ankles.  Currently, the pain has resolved.  She does have bilateral lower extremity edema, left worse than right, but patient states that her left leg has always been larger than her right.  Patient reports that she drinks a lot of water daily, and does not eat excessive sodium.  She is on her feet 6 hours a day while at work and regularly walking throughout the day in the kitchen at work.  She does note that the swelling in bilateral lower extremities does improve in the morning when she wakes up. The patient denies chest pain, shortness of breath, headaches, dizziness, blurry vision, or dyspnea on exertion.     Allergies: Cephalexin, Fenofibrate, Food, and Levaquin  Current Outpatient Medications Ordered in Epic   Medication Sig Dispense Refill   • TRICOR 145 MG Tab Take 1 tablet by mouth every day. 90 tablet 0   • albuterol 108 (90 Base) MCG/ACT Aero Soln inhalation aerosol Inhale 2 Puffs every four hours as needed.     • diclofenac sodium 1 % Gel Apply 2 grams to affected area no more than four times per day 100 g 0   • Calcium Carbonate-Vitamin D (CALTRATE 600+D PO) Take  by mouth.     • Multiple Vitamins-Minerals (CENTRUM SILVER) Chew Tab Take  by mouth.     • fluticasone-salmeterol (ADVAIR) 250-50 MCG/DOSE AEROSOL POWDER, BREATH ACTIVATED Inhale 1 Puff by mouth every 12 hours.     • fluticasone (FLONASE) 50 MCG/ACT nasal spray Spray 1 Spray in nose every day.     •  fexofenadine (ALLEGRA) 60 MG TABS Take 60 mg by mouth every day.       No current [facility]-ordered facility-administered medications on file.     Past Medical History:   Diagnosis Date   • Arthritis    • ASTHMA    • Breath shortness     with exertion   • Factor 5 Leiden mutation, heterozygous (HCC)    • Hypercholesterolemia    • Warthin's tumor 2020     Past Surgical History:   Procedure Laterality Date   • JOSELITO BY LAPAROSCOPY N/A 2015    Procedure: JOSELITO BY LAPAROSCOPY;  Surgeon: Grace Montgomery M.D.;  Location: SURGERY [facility];  Service:    • GYN SURGERY      hysterectomy   • ABDOMINAL HYSTERECTOMY TOTAL     • NASAL POLYPECTOMY       Social History     Tobacco Use   • Smoking status: Former Smoker     Packs/day: 1.00     Years: 25.00     Pack years: 25.00     Types: Cigarettes     Start date: 1975     Quit date: 2000     Years since quittin.6   • Smokeless tobacco: Never Used   Vaping Use   • Vaping Use: Not on file   Substance Use Topics   • Alcohol use: Yes     Comment: occasional    • Drug use: No     Social History     Social History Narrative   • Not on file     Family History   Problem Relation Age of Onset   • Cancer Mother         lymphoma   • Stroke Father    • Hypertension Father    • Cancer Sister         colon   • Heart Disease Brother 50        CHF   • Other Daughter         Factor V   • No Known Problems Maternal Grandmother    • No Known Problems Maternal Grandfather    • No Known Problems Paternal Grandmother    • No Known Problems Paternal Grandfather    • Hypertension Sister    • Heart Disease Sister         stent   • Leukemia Brother    • Other Brother         Factor V   • No Known Problems Brother    • Heart Disease Brother         open heart surgery   • No Known Problems Brother    • Pulmonary Embolism Brother      ROS:   See HPI      Exam: /76 (BP Location: Left arm, Patient Position: Sitting, BP Cuff Size: Adult)   Pulse 83   Temp 36.4 °C (97.6 °F)  "(Temporal)   Ht 1.6 m (5' 3\")   Wt 81.6 kg (180 lb)   SpO2 94%  Body mass index is 31.89 kg/m².    General: Well nourished, well developed female in NAD, awake and conversant.  Eyes: Normal conjunctiva, anicteric.  Round symmetrical pupils.  ENT: Hearing grossly intact.  No nasal discharge.  Neck: Neck is supple.  No masses or thyromegaly.  CV: Bilateral lower extremity a generalized 1+ pitting edema, no redness, tenderness.  Left lower extremity larger than right lower extremity at baseline per patient.  Respiratory: Respirations are nonlabored.  No wheezing.  Abdomen: Non-Distended.  Skin: Warm.  No rashes or ulcers.  MSK: Normal ambulation.  No clubbing or cyanosis.  Neuro: Sensation and CN II-XII grossly normal.  Psych: Alert and oriented.  Cooperative, appropriate mood and affect, normal judgment.     Assessment/Plan:  1. Left foot pain  New to examiner.  Patient experienced left foot pain that started on 1/13/2022 and lasted for 3 days before resolving.  She has had some more noticeable left ankle swelling.  Encourage patient to wear bilateral lower extremity compression stockings daily while working, continue to drink water and limit sodium intake, elevate bilateral lower extremities above the level of the heart in the evening.  Encouraged shoes with good arch support or arch inserts.  Return to be seen if pain returns or if swelling worsens or develops other concerning symptoms such as shortness of breath, difficulty breathing, chest pain.    Educated in proper administration of medication(s) ordered today including safety, possible SE, risks, benefits, rationale and alternatives to therapy.   Supportive care, differential diagnoses, and indications for immediate follow-up discussed with patient.    Pathogenesis of diagnosis discussed including typical length and natural progression.    Instructed to return to clinic or nearest emergency department for any change in condition, further concerns, or " worsening of symptoms.  Patient states understanding of the plan of care and discharge instructions.    Return if symptoms worsen or fail to improve.    Please note that this dictation was created using voice recognition software. I have made every reasonable attempt to correct obvious errors, but I expect that there are errors of grammar and possibly content that I did not discover before finalizing the note.

## 2022-04-12 ENCOUNTER — OFFICE VISIT (OUTPATIENT)
Dept: MEDICAL GROUP | Facility: PHYSICIAN GROUP | Age: 72
End: 2022-04-12
Payer: MEDICARE

## 2022-04-12 ENCOUNTER — HOSPITAL ENCOUNTER (OUTPATIENT)
Facility: MEDICAL CENTER | Age: 72
End: 2022-04-12
Attending: NURSE PRACTITIONER
Payer: MEDICARE

## 2022-04-12 VITALS
HEIGHT: 63 IN | RESPIRATION RATE: 13 BRPM | TEMPERATURE: 98.3 F | WEIGHT: 181 LBS | OXYGEN SATURATION: 93 % | HEART RATE: 72 BPM | BODY MASS INDEX: 32.07 KG/M2 | DIASTOLIC BLOOD PRESSURE: 84 MMHG | SYSTOLIC BLOOD PRESSURE: 142 MMHG

## 2022-04-12 DIAGNOSIS — J01.40 ACUTE NON-RECURRENT PANSINUSITIS: ICD-10-CM

## 2022-04-12 DIAGNOSIS — E78.2 MIXED HYPERLIPIDEMIA: ICD-10-CM

## 2022-04-12 PROBLEM — J34.89 SINUS PAIN: Status: ACTIVE | Noted: 2022-04-12

## 2022-04-12 LAB
EXTERNAL QUALITY CONTROL: NORMAL
SARS-COV+SARS-COV-2 AG RESP QL IA.RAPID: NEGATIVE

## 2022-04-12 PROCEDURE — 99213 OFFICE O/P EST LOW 20 MIN: CPT | Mod: CS | Performed by: NURSE PRACTITIONER

## 2022-04-12 PROCEDURE — U0003 INFECTIOUS AGENT DETECTION BY NUCLEIC ACID (DNA OR RNA); SEVERE ACUTE RESPIRATORY SYNDROME CORONAVIRUS 2 (SARS-COV-2) (CORONAVIRUS DISEASE [COVID-19]), AMPLIFIED PROBE TECHNIQUE, MAKING USE OF HIGH THROUGHPUT TECHNOLOGIES AS DESCRIBED BY CMS-2020-01-R: HCPCS

## 2022-04-12 PROCEDURE — 87426 SARSCOV CORONAVIRUS AG IA: CPT | Performed by: NURSE PRACTITIONER

## 2022-04-12 PROCEDURE — U0005 INFEC AGEN DETEC AMPLI PROBE: HCPCS

## 2022-04-12 RX ORDER — AZITHROMYCIN 250 MG/1
TABLET, FILM COATED ORAL
Qty: 6 TABLET | Refills: 0 | Status: SHIPPED | OUTPATIENT
Start: 2022-04-12 | End: 2022-04-17

## 2022-04-12 RX ORDER — FENOFIBRATE 145 MG/1
145 TABLET ORAL
Qty: 90 TABLET | Refills: 3 | Status: SHIPPED | OUTPATIENT
Start: 2022-04-12 | End: 2023-04-09

## 2022-04-12 ASSESSMENT — FIBROSIS 4 INDEX: FIB4 SCORE: 1.7

## 2022-04-12 NOTE — ASSESSMENT & PLAN NOTE
New to examiner, patient reports symptoms began on Saturday and worsened on Monday requiring her to leave work early.  Reports symptoms of hot flash, dry mouth, body aches, chills, right ear tenderness, bilateral eye pain, headache, dry and stuffy nose.  Denies sore throat, cough, shortness of breath, fevers.  She has taken over-the-counter ibuprofen.

## 2022-04-12 NOTE — ASSESSMENT & PLAN NOTE
Continues TriCor 145 mg daily, brand-name only, denies side effects of the medication.  Due for updated annual labs in July 2022.  Requesting medication refill.

## 2022-04-12 NOTE — PROGRESS NOTES
CC:   Chief Complaint   Patient presents with   • Sinusitis     HISTORY OF THE PRESENT ILLNESS: Patient is a 71 y.o. female. This pleasant patient is here today to discuss sinusitis symptoms starting Saturday, 4/9/2022.    Health Maintenance: Completed    Sinus pain  New to examiner, patient reports symptoms began on Saturday and worsened on Monday requiring her to leave work early.  Reports symptoms of hot flash, dry mouth, body aches, chills, right ear tenderness, bilateral eye pain, headache, dry and stuffy nose.  Denies sore throat, cough, shortness of breath, fevers.  She has taken over-the-counter ibuprofen.    Mixed hyperlipidemia  Continues TriCor 145 mg daily, brand-name only, denies side effects of the medication.  Due for updated annual labs in July 2022.  Requesting medication refill.    Allergies: Cephalexin, Fenofibrate, Food, and Levaquin  Current Outpatient Medications Ordered in Epic   Medication Sig Dispense Refill   • fluticasone-salmeterol (WIXELA INHUB) 100-50 MCG/DOSE AEROSOL POWDER, BREATH ACTIVATED Inhale 1 Puff every 12 hours.     • TRICOR 145 MG Tab Take 1 Tablet by mouth every day. 90 Tablet 3   • azithromycin (ZITHROMAX) 250 MG Tab Take 2 Tablets by mouth every day for 1 day, THEN 1 Tablet every day for 4 days. 6 Tablet 0   • Calcium Carbonate-Vitamin D (CALTRATE 600+D PO) Take  by mouth.     • Multiple Vitamins-Minerals (CENTRUM SILVER) Chew Tab Take  by mouth.     • fluticasone (FLONASE) 50 MCG/ACT nasal spray Spray 1 Spray in nose every day.     • fexofenadine (ALLEGRA) 60 MG TABS Take 60 mg by mouth every day.       No current [facility]-ordered facility-administered medications on file.     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     Past Surgical History:   Procedure Laterality Date   • JOSELITO BY LAPAROSCOPY N/A 6/19/2015  "   Procedure: JOSELITO BY LAPAROSCOPY;  Surgeon: Grace Montgomery M.D.;  Location: SURGERY [facility];  Service:    • GYN SURGERY      hysterectomy   • ABDOMINAL HYSTERECTOMY TOTAL     • NASAL POLYPECTOMY       Social History     Tobacco Use   • Smoking status: Former Smoker     Packs/day: 1.00     Years: 25.00     Pack years: 25.00     Types: Cigarettes     Start date: 1975     Quit date: 2000     Years since quittin.8   • Smokeless tobacco: Never Used   Substance Use Topics   • Alcohol use: Yes     Comment: occasional    • Drug use: No     Social History     Social History Narrative   • Not on file     Family History   Problem Relation Age of Onset   • Cancer Mother         lymphoma   • Stroke Father    • Hypertension Father    • Cancer Sister         colon   • Heart Disease Brother 50        CHF   • Other Daughter         Factor V   • No Known Problems Maternal Grandmother    • No Known Problems Maternal Grandfather    • No Known Problems Paternal Grandmother    • No Known Problems Paternal Grandfather    • Hypertension Sister    • Heart Disease Sister         stent   • Leukemia Brother    • Other Brother         Factor V   • No Known Problems Brother    • Heart Disease Brother         open heart surgery   • No Known Problems Brother    • Pulmonary Embolism Brother      ROS:   See HPI      Exam: /84 (BP Location: Left arm, Patient Position: Sitting, BP Cuff Size: Adult)   Pulse 72   Temp 36.8 °C (98.3 °F) (Temporal)   Resp 13   Ht 1.6 m (5' 3\")   Wt 82.1 kg (181 lb)   SpO2 93%  Body mass index is 32.06 kg/m².    General: Normal appearing. No distress.  HEENT: Normocephalic. Eyes conjunctiva clear lids without ptosis, pupils equal and reactive to light accommodation, ears normal shape and contour, canals are clear bilaterally, tympanic membranes are benign, nasal mucosa benign, oropharynx is without erythema, edema or exudates. Sinuses (frontal and maxillary) nontender to " palpation.  Pulmonary: Clear to ausculation.  Normal effort. No rales, rhonchi, or wheezing.  Cardiovascular: Regular rate and rhythm without murmur. Carotid and radial pulses are intact and equal bilaterally.  Neurologic: Grossly nonfocal.  Lymph: No cervical or supraclavicular lymph nodes are palpable.  Skin: Warm and dry.  No obvious lesions.  Musculoskeletal: Normal gait. No extremity cyanosis, clubbing, or edema.  Psych: Normal mood and affect. Alert and oriented x3. Judgment and insight is normal.     Assessment/Plan:  1. Acute non-recurrent pansinusitis  New to examiner and patient.  Covid antigen test negative in clinic, Covid PCR sent, will notify patient of results through [facility] when received.  Start azithromycin 500 mg on day 1, 250 mg daily for 4 days.  Return to be seen 4/18/2022 if symptoms do not improve, if symptoms improve with antibiotics may cancel appointment.  Return to work note provided to patient.  --Increase fluid intake, rest.  --Nasal irrigation or a Neti-pot.  --Humidifier in room or hot shower/bath.  --Tylenol and nonsteroidal anti-inflammatories (Advil, Naproxen, ibuprofen).  --Guaifenesin as an expectorant (Mucinex, Robitussin, etc).  --Phenylephrine or pseudoephedrine as a decongestant (Sudafed, etc).  --Dextromethorphan for cough (Robitussin, Delsym, Triamimic over-the-counter) or Tessalon perles (prescription).  --Intranasal steroids (Flonase, prescription or over-the-counter).  --Intranasal decongestant, oxymetazolin, for 3 days at most (Afrin spray, Mucinex).   - POCT SARS-COV Antigen VANESSA (Symptomatic only)  - COVID/SARS CoV-2 PCR; Future  - azithromycin (ZITHROMAX) 250 MG Tab; Take 2 Tablets by mouth every day for 1 day, THEN 1 Tablet every day for 4 days.  Dispense: 6 Tablet; Refill: 0    2. Mixed hyperlipidemia  Continue TriCor brand-name 145 mg daily, refill sent to pharmacy.  Due for updated lipid profile in July 2022, previously ordered.  - TRICOR 145 MG Tab; Take 1 Tablet  by mouth every day.  Dispense: 90 Tablet; Refill: 3     Educated in proper administration of medication(s) ordered today including safety, possible SE, risks, benefits, rationale and alternatives to therapy.   Supportive care, differential diagnoses, and indications for immediate follow-up discussed with patient.    Pathogenesis of diagnosis discussed including typical length and natural progression.    Instructed to return to clinic or nearest emergency department for any change in condition, further concerns, or worsening of symptoms.  Patient states understanding of the plan of care and discharge instructions.    Return in about 6 days (around 4/18/2022) for Follow-up sinusitis.    I have placed the below orders and discussed them with an approved delegating provider. The MA is performing the below orders under the direction of Dr. Henning.    Please note that this dictation was created using voice recognition software. I have made every reasonable attempt to correct obvious errors, but I expect that there are errors of grammar and possibly content that I did not discover before finalizing the note.

## 2022-04-12 NOTE — LETTER
April 12, 2022         Patient: Greta Tran   YOB: 1950   Date of Visit: 4/12/2022           To Whom it May Concern:    Greta Tran was seen in my clinic on 4/12/2022. She may return to work on Tuesday April 19, 2022.    If you have any questions or concerns, please don't hesitate to call.        Sincerely,           SHANNAN Almazan.  Electronically Signed

## 2022-04-13 DIAGNOSIS — J01.40 ACUTE NON-RECURRENT PANSINUSITIS: ICD-10-CM

## 2022-04-13 LAB
COVID ORDER STATUS COVID19: NORMAL
SARS-COV-2 RNA RESP QL NAA+PROBE: NOTDETECTED
SPECIMEN SOURCE: NORMAL

## 2022-07-05 ENCOUNTER — TELEPHONE (OUTPATIENT)
Dept: PHYSICAL THERAPY | Facility: REHABILITATION | Age: 72
End: 2022-07-05
Payer: MEDICARE

## 2022-07-05 NOTE — OP THERAPY DISCHARGE SUMMARY
Outpatient Physical Therapy  DISCHARGE SUMMARY NOTE      [facility] Physical Therapy [address], [address]  [place] [address]  Phone:  [phone]    Date of Visit: 07/05/2022    Patient: Greta Tran  YOB: 1950  MRN: [id number]     Referring Provider:  Grant Tobias M.D.     Referring Diagnosis  Right shoulder pain, unspecified chronicity            Functional Assessment Used        Your patient is being discharged from Physical Therapy with the following comments:   · Goals partially met  · Patient has failed to schedule or reschedule follow-up visits    Comments:  Please see daily treatment notes for details     Limitations Remaining:    Recommendations:Discharge from PT    Sapphire White PT, MSPT    Date: 7/5/2022

## 2022-07-06 ENCOUNTER — HOSPITAL ENCOUNTER (OUTPATIENT)
Dept: LAB | Facility: MEDICAL CENTER | Age: 72
End: 2022-07-06
Attending: NURSE PRACTITIONER
Payer: MEDICARE

## 2022-07-06 DIAGNOSIS — J45.20 MILD INTERMITTENT ASTHMA WITHOUT COMPLICATION: ICD-10-CM

## 2022-07-06 DIAGNOSIS — M85.89 OSTEOPENIA OF MULTIPLE SITES: ICD-10-CM

## 2022-07-06 DIAGNOSIS — Z00.00 MEDICARE ANNUAL WELLNESS VISIT, SUBSEQUENT: ICD-10-CM

## 2022-07-06 DIAGNOSIS — E66.9 OBESITY (BMI 30-39.9): ICD-10-CM

## 2022-07-06 DIAGNOSIS — Z00.00 ROUTINE HEALTH MAINTENANCE: ICD-10-CM

## 2022-07-06 DIAGNOSIS — E78.2 MIXED HYPERLIPIDEMIA: ICD-10-CM

## 2022-07-06 LAB
ALBUMIN SERPL BCP-MCNC: 4.4 G/DL (ref 3.2–4.9)
ALBUMIN/GLOB SERPL: 1.4 G/DL
ALP SERPL-CCNC: 43 U/L (ref 30–99)
ALT SERPL-CCNC: 17 U/L (ref 2–50)
ANION GAP SERPL CALC-SCNC: 13 MMOL/L (ref 7–16)
AST SERPL-CCNC: 17 U/L (ref 12–45)
BASOPHILS # BLD AUTO: 0.6 % (ref 0–1.8)
BASOPHILS # BLD: 0.03 K/UL (ref 0–0.12)
BILIRUB SERPL-MCNC: 0.3 MG/DL (ref 0.1–1.5)
BUN SERPL-MCNC: 20 MG/DL (ref 8–22)
CALCIUM SERPL-MCNC: 9.8 MG/DL (ref 8.5–10.5)
CHLORIDE SERPL-SCNC: 108 MMOL/L (ref 96–112)
CHOLEST SERPL-MCNC: 131 MG/DL (ref 100–199)
CO2 SERPL-SCNC: 24 MMOL/L (ref 20–33)
CREAT SERPL-MCNC: 0.69 MG/DL (ref 0.5–1.4)
EOSINOPHIL # BLD AUTO: 0.4 K/UL (ref 0–0.51)
EOSINOPHIL NFR BLD: 8.1 % (ref 0–6.9)
ERYTHROCYTE [DISTWIDTH] IN BLOOD BY AUTOMATED COUNT: 46.5 FL (ref 35.9–50)
FASTING STATUS PATIENT QL REPORTED: NORMAL
GFR SERPLBLD CREATININE-BSD FMLA CKD-EPI: 93 ML/MIN/1.73 M 2
GLOBULIN SER CALC-MCNC: 3.2 G/DL (ref 1.9–3.5)
GLUCOSE SERPL-MCNC: 90 MG/DL (ref 65–99)
HCT VFR BLD AUTO: 43.6 % (ref 37–47)
HDLC SERPL-MCNC: 36 MG/DL
HGB BLD-MCNC: 14.1 G/DL (ref 12–16)
IMM GRANULOCYTES # BLD AUTO: 0.03 K/UL (ref 0–0.11)
IMM GRANULOCYTES NFR BLD AUTO: 0.6 % (ref 0–0.9)
LDLC SERPL CALC-MCNC: 71 MG/DL
LYMPHOCYTES # BLD AUTO: 1.59 K/UL (ref 1–4.8)
LYMPHOCYTES NFR BLD: 32.2 % (ref 22–41)
MCH RBC QN AUTO: 31.2 PG (ref 27–33)
MCHC RBC AUTO-ENTMCNC: 32.3 G/DL (ref 33.6–35)
MCV RBC AUTO: 96.5 FL (ref 81.4–97.8)
MONOCYTES # BLD AUTO: 0.36 K/UL (ref 0–0.85)
MONOCYTES NFR BLD AUTO: 7.3 % (ref 0–13.4)
NEUTROPHILS # BLD AUTO: 2.53 K/UL (ref 2–7.15)
NEUTROPHILS NFR BLD: 51.2 % (ref 44–72)
NRBC # BLD AUTO: 0 K/UL
NRBC BLD-RTO: 0 /100 WBC
PLATELET # BLD AUTO: 265 K/UL (ref 164–446)
PMV BLD AUTO: 9.5 FL (ref 9–12.9)
POTASSIUM SERPL-SCNC: 4.2 MMOL/L (ref 3.6–5.5)
PROT SERPL-MCNC: 7.6 G/DL (ref 6–8.2)
RBC # BLD AUTO: 4.52 M/UL (ref 4.2–5.4)
SODIUM SERPL-SCNC: 145 MMOL/L (ref 135–145)
TRIGL SERPL-MCNC: 120 MG/DL (ref 0–149)
TSH SERPL DL<=0.005 MIU/L-ACNC: 2.18 UIU/ML (ref 0.38–5.33)
WBC # BLD AUTO: 4.9 K/UL (ref 4.8–10.8)

## 2022-07-06 PROCEDURE — 36415 COLL VENOUS BLD VENIPUNCTURE: CPT

## 2022-07-06 PROCEDURE — 80061 LIPID PANEL: CPT

## 2022-07-06 PROCEDURE — 80053 COMPREHEN METABOLIC PANEL: CPT

## 2022-07-06 PROCEDURE — 85025 COMPLETE CBC W/AUTO DIFF WBC: CPT

## 2022-07-06 PROCEDURE — 84443 ASSAY THYROID STIM HORMONE: CPT

## 2022-08-30 ENCOUNTER — TELEPHONE (OUTPATIENT)
Dept: MEDICAL GROUP | Facility: PHYSICIAN GROUP | Age: 72
End: 2022-08-30
Payer: MEDICARE

## 2022-08-30 NOTE — TELEPHONE ENCOUNTER
Future Appointments         Provider Department Center    9/7/2022 3:20 PM (Arrive by 3:05 PM) [facility]ma[facility]          ANNUAL WELLNESS VISIT PRE-VISIT PLANNING    1.  Reviewed notes from the last office visit: Yes, LOV 04/12/2022    2.  If any orders were ordered or intended to be done prior to visit (i.e. 6 mos follow-up), do we have results/consult notes or has patient scheduled?          Labs - Labs were not ordered at last office visit.  Note: If patient appointment is for lab review and patient did not complete labs, check with provider if OK to reschedule patient until labs completed.         Imaging - Imaging was not ordered at last office visit.         Referrals - No referrals were ordered at last office visit.    3.  Immunizations were updated in Epic using Reconcile Outside Information activity? Yes         Is patient due for Tdap? NO         Is patient due for Shingrix? NO    4.  Patient is due for the following Health Maintenance Topics:   Health Maintenance Due   Topic Date Due    IMM HEP B VACCINE (1 of 3 - 3-dose series) Never done    COVID-19 Vaccine (3 - Booster for Pfizer series) 03/29/2022    IMM INFLUENZA (1) 09/01/2022    MAMMOGRAM  09/11/2022       - Patient already has appointment scheduled for Annual Wellness Visit (AWV). Pt  will discuss Hep B with provider.    5.  Reviewed/Updated the following with patient:          Preferred Pharmacy? Yes          Preferred Lab? Yes          Preferred Communication? Yes          Allergies? Yes          Medications? YES. Was Abstract Encounter opened and chart updated? YES          Social History? Yes          Family History (document living status of immediate family members and if + hx of  cancer, diabetes, hypertension, hyperlipidemia, heart attack, stroke) Yes    6.  Care Team Updated:          DME Company (gait device, O2, CPAP, etc.): NO          Other Specialists (eye doctor, derm, GYN, cardiology, endo,  etc): YES    7.  Patient was advised: “This is a free wellness visit. The provider will screen for medical conditions to help you stay healthy. If you have other concerns to address you may be asked to discuss these at a separate visit or there may be an additional fee.”     8.  AHA (Puls8) form printed for Provider? N/A  Pt reminded of her check in time.

## 2022-09-05 ENCOUNTER — OFFICE VISIT (OUTPATIENT)
Dept: URGENT CARE | Facility: PHYSICIAN GROUP | Age: 72
End: 2022-09-05
Payer: MEDICARE

## 2022-09-05 VITALS
WEIGHT: 180 LBS | OXYGEN SATURATION: 92 % | HEIGHT: 63 IN | BODY MASS INDEX: 31.89 KG/M2 | TEMPERATURE: 98.2 F | DIASTOLIC BLOOD PRESSURE: 86 MMHG | HEART RATE: 95 BPM | RESPIRATION RATE: 14 BRPM | SYSTOLIC BLOOD PRESSURE: 128 MMHG

## 2022-09-05 DIAGNOSIS — R11.0 NAUSEA: ICD-10-CM

## 2022-09-05 DIAGNOSIS — R51.9 GENERALIZED HEADACHE: ICD-10-CM

## 2022-09-05 PROCEDURE — 99214 OFFICE O/P EST MOD 30 MIN: CPT | Performed by: NURSE PRACTITIONER

## 2022-09-05 ASSESSMENT — FIBROSIS 4 INDEX: FIB4 SCORE: 1.09

## 2022-09-05 NOTE — PROGRESS NOTES
Greta Tran is a 72 y.o. female who presents for Nausea (X1 month )      HPI This is a new problem. Greta Tran is a 72 y.o. 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 Sx only occur when she larsen her trees. She larsen by hand. She has never felt this way before.   Sometimes when she is watering she feels a little light headed. She thinks she may have developed an allergy to the trees that is causing her to be nauseated and developed a headache.  She typically larsen in the afternoon when it is very hot outside.  She said this is the best time for her scheduled to be watering.  She feels mildly nauseated with a headache for a few hours and sometimes for the remaining of the evening. She never throws up.     Treatments tried:tylenol for headache.  Tylenol not really helping.  Denies sob, diaphoresis, c/p, weakness, rhinitis, sore throat, abd pain , vomiting, diarrhea, dizzy.    No other aggravating or alleviating factors.       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     Past Surgical History:   Procedure Laterality Date    JOSELITO BY LAPAROSCOPY N/A 6/19/2015    Procedure: JOSELITO BY LAPAROSCOPY;  Surgeon: Grace Montgomery M.D.;  Location: SURGERY [facility];  Service:     GYN SURGERY  1993    hysterectomy    ABDOMINAL HYSTERECTOMY TOTAL      NASAL POLYPECTOMY       Family History   Problem Relation Age of Onset    Cancer Mother         lymphoma     Stroke Father     Hypertension Father     Cancer Sister         colon    Heart Disease Brother 50        CHF    Other Daughter         Factor V    No Known Problems Maternal Grandmother     No Known Problems Maternal Grandfather     No Known Problems Paternal Grandmother     No Known Problems Paternal Grandfather     Hypertension Sister     Heart Disease Sister         stent    Leukemia Brother     Other Brother         Factor V    No Known Problems Brother     Heart Disease Brother         open heart surgery    No Known Problems Brother     Pulmonary Embolism Brother      Social History     Tobacco Use    Smoking status: Former     Packs/day: 1.00     Years: 25.00     Pack years: 25.00     Types: Cigarettes     Start date: 1975     Quit date: 2000     Years since quittin.2    Smokeless tobacco: Never   Substance Use Topics    Alcohol use: Yes     Comment: occasional        Problems:   Patient Active Problem List   Diagnosis    Mild intermittent asthma without complication    Multiple allergies    Mixed hyperlipidemia    Osteopenia of multiple sites    Obesity (BMI 30-39.9)    Enlarged lymph node in neck    Osteoarthritis of left shoulder    Left foot pain    Sinus pain       Medications:   Current Outpatient Medications on File Prior to Visit   Medication Sig Dispense Refill    fluticasone-salmeterol (ADVAIR) 100-50 MCG/DOSE AEROSOL POWDER, BREATH ACTIVATED Inhale 1 Puff every 12 hours.      TRICOR 145 MG Tab Take 1 Tablet by mouth every day. 90 Tablet 3    Calcium Carbonate-Vitamin D (CALTRATE 600+D PO) Take  by mouth.      Multiple Vitamins-Minerals (CENTRUM SILVER) Chew Tab Take  by mouth.      fluticasone (FLONASE) 50 MCG/ACT nasal spray Spray 1 Spray in nose every day.      fexofenadine (ALLEGRA) 60 MG TABS Take 60 mg by mouth every day.       No current facility-administered medications on file prior to visit.          Objective:     /86   Pulse 95   Temp 36.8 °C (98.2 °F) (Temporal)    "Resp 14   Ht 1.6 m (5' 3\")   Wt 81.6 kg (180 lb)   SpO2 92%   BMI 31.89 kg/m²     Physical Exam  Vitals reviewed.   Constitutional:       Appearance: Normal appearance. She is normal weight.   Cardiovascular:      Rate and Rhythm: Normal rate and regular rhythm.      Pulses: Normal pulses.      Heart sounds: Normal heart sounds.   Pulmonary:      Effort: Pulmonary effort is normal.      Breath sounds: Normal breath sounds.   Skin:     General: Skin is warm.      Capillary Refill: Capillary refill takes less than 2 seconds.   Neurological:      Mental Status: She is alert and oriented to person, place, and time.   Psychiatric:         Mood and Affect: Mood normal.         Behavior: Behavior normal.         Thought Content: Thought content normal.         Assessment /Associated Orders:      1. Generalized headache        2. Nausea              Medical Decision Making:    Pt is clinically stable at today's acute urgent care visit.  No acute distress noted. Appropriate for outpatient care at this time.   Acute problem today .     Keep well hydrated.   Stay out of heat.  Advised to water her trees early in the morning or in the evening during cooler temperatures.  She could also find someone else to water her trees.  She believes she must be allergic to these trays.  I believe the etiology of her symptoms is more that she is getting overheated while she is watering them.  She becomes nauseated, mildly lightheaded and has a headache that lasts for several hours after she is done watering her trees.  FU PCP    Discussed Dx, management options (risks,benefits, and alternatives to planned treatment), natural progression and supportive care.  Expressed understanding and the treatment plan was agreed upon.   Questions were encouraged and answered   Return to urgent care prn if new or worsening sx or if there is no improvement in condition prn.    Educated in Red flags and indications to immediately call 911 or present to " the Emergency Department.       Time spent evaluating Greta in urgent care today was at least 32  minutes. This time includes preparing for visit, reviewing any pertinent external notes or test results, counseling/education, exam and evaluation, obtaining history,  and interpretation of lab tests, medication management and documentation as indicated above.Time does not include separately billable procedures noted .       Please note that this dictation was created using voice recognition software. I have worked with consultants from the vendor as well as technical experts from [facility] to optimize the interface. I have made every reasonable attempt to correct obvious errors, but I expect that there are errors of grammar and possibly content that I did not discover before finalizing the note.  This note was electronically signed by provider

## 2022-09-11 SDOH — ECONOMIC STABILITY: INCOME INSECURITY: IN THE LAST 12 MONTHS, WAS THERE A TIME WHEN YOU WERE NOT ABLE TO PAY THE MORTGAGE OR RENT ON TIME?: NO

## 2022-09-11 SDOH — ECONOMIC STABILITY: HOUSING INSECURITY
IN THE LAST 12 MONTHS, WAS THERE A TIME WHEN YOU DID NOT HAVE A STEADY PLACE TO SLEEP OR SLEPT IN A SHELTER (INCLUDING NOW)?: NO

## 2022-09-11 SDOH — ECONOMIC STABILITY: FOOD INSECURITY: WITHIN THE PAST 12 MONTHS, THE FOOD YOU BOUGHT JUST DIDN'T LAST AND YOU DIDN'T HAVE MONEY TO GET MORE.: NEVER TRUE

## 2022-09-11 SDOH — ECONOMIC STABILITY: TRANSPORTATION INSECURITY
IN THE PAST 12 MONTHS, HAS LACK OF RELIABLE TRANSPORTATION KEPT YOU FROM MEDICAL APPOINTMENTS, MEETINGS, WORK OR FROM GETTING THINGS NEEDED FOR DAILY LIVING?: NO

## 2022-09-11 SDOH — HEALTH STABILITY: MENTAL HEALTH

## 2022-09-11 SDOH — HEALTH STABILITY: PHYSICAL HEALTH: ON AVERAGE, HOW MANY MINUTES DO YOU ENGAGE IN EXERCISE AT THIS LEVEL?: PATIENT DECLINED

## 2022-09-11 SDOH — HEALTH STABILITY: PHYSICAL HEALTH
ON AVERAGE, HOW MANY DAYS PER WEEK DO YOU ENGAGE IN MODERATE TO STRENUOUS EXERCISE (LIKE A BRISK WALK)?: PATIENT DECLINED

## 2022-09-11 SDOH — ECONOMIC STABILITY: TRANSPORTATION INSECURITY
IN THE PAST 12 MONTHS, HAS THE LACK OF TRANSPORTATION KEPT YOU FROM MEDICAL APPOINTMENTS OR FROM GETTING MEDICATIONS?: NO

## 2022-09-11 SDOH — ECONOMIC STABILITY: HOUSING INSECURITY

## 2022-09-11 SDOH — ECONOMIC STABILITY: FOOD INSECURITY: WITHIN THE PAST 12 MONTHS, YOU WORRIED THAT YOUR FOOD WOULD RUN OUT BEFORE YOU GOT MONEY TO BUY MORE.: NEVER TRUE

## 2022-09-11 SDOH — ECONOMIC STABILITY: TRANSPORTATION INSECURITY
IN THE PAST 12 MONTHS, HAS LACK OF TRANSPORTATION KEPT YOU FROM MEETINGS, WORK, OR FROM GETTING THINGS NEEDED FOR DAILY LIVING?: NO

## 2022-09-11 SDOH — ECONOMIC STABILITY: INCOME INSECURITY: HOW HARD IS IT FOR YOU TO PAY FOR THE VERY BASICS LIKE FOOD, HOUSING, MEDICAL CARE, AND HEATING?: NOT HARD AT ALL

## 2022-09-11 ASSESSMENT — SOCIAL DETERMINANTS OF HEALTH (SDOH)
HOW OFTEN DO YOU ATTENT MEETINGS OF THE CLUB OR ORGANIZATION YOU BELONG TO?: PATIENT DECLINED
HOW OFTEN DO YOU ATTENT MEETINGS OF THE CLUB OR ORGANIZATION YOU BELONG TO?: PATIENT DECLINED
HOW OFTEN DO YOU HAVE A DRINK CONTAINING ALCOHOL: NEVER
IN A TYPICAL WEEK, HOW MANY TIMES DO YOU TALK ON THE PHONE WITH FAMILY, FRIENDS, OR NEIGHBORS?: TWICE A WEEK
HOW OFTEN DO YOU ATTEND CHURCH OR RELIGIOUS SERVICES?: PATIENT DECLINED
HOW OFTEN DO YOU HAVE SIX OR MORE DRINKS ON ONE OCCASION: NEVER
HOW HARD IS IT FOR YOU TO PAY FOR THE VERY BASICS LIKE FOOD, HOUSING, MEDICAL CARE, AND HEATING?: NOT HARD AT ALL
DO YOU BELONG TO ANY CLUBS OR ORGANIZATIONS SUCH AS CHURCH GROUPS UNIONS, FRATERNAL OR ATHLETIC GROUPS, OR SCHOOL GROUPS?: NO
DO YOU BELONG TO ANY CLUBS OR ORGANIZATIONS SUCH AS CHURCH GROUPS UNIONS, FRATERNAL OR ATHLETIC GROUPS, OR SCHOOL GROUPS?: NO
IN A TYPICAL WEEK, HOW MANY TIMES DO YOU TALK ON THE PHONE WITH FAMILY, FRIENDS, OR NEIGHBORS?: TWICE A WEEK
HOW MANY DRINKS CONTAINING ALCOHOL DO YOU HAVE ON A TYPICAL DAY WHEN YOU ARE DRINKING: PATIENT DOES NOT DRINK
HOW OFTEN DO YOU GET TOGETHER WITH FRIENDS OR RELATIVES?: PATIENT DECLINED
HOW OFTEN DO YOU GET TOGETHER WITH FRIENDS OR RELATIVES?: PATIENT DECLINED
HOW OFTEN DO YOU ATTEND CHURCH OR RELIGIOUS SERVICES?: PATIENT DECLINED
WITHIN THE PAST 12 MONTHS, YOU WORRIED THAT YOUR FOOD WOULD RUN OUT BEFORE YOU GOT THE MONEY TO BUY MORE: NEVER TRUE

## 2022-09-11 ASSESSMENT — LIFESTYLE VARIABLES
AUDIT-C TOTAL SCORE: 0
HOW OFTEN DO YOU HAVE SIX OR MORE DRINKS ON ONE OCCASION: NEVER
HOW OFTEN DO YOU HAVE A DRINK CONTAINING ALCOHOL: NEVER
HOW MANY STANDARD DRINKS CONTAINING ALCOHOL DO YOU HAVE ON A TYPICAL DAY: PATIENT DOES NOT DRINK
SKIP TO QUESTIONS 9-10: 1

## 2022-09-12 ENCOUNTER — OFFICE VISIT (OUTPATIENT)
Dept: MEDICAL GROUP | Facility: PHYSICIAN GROUP | Age: 72
End: 2022-09-12
Payer: MEDICARE

## 2022-09-12 VITALS
WEIGHT: 182 LBS | DIASTOLIC BLOOD PRESSURE: 60 MMHG | SYSTOLIC BLOOD PRESSURE: 104 MMHG | HEART RATE: 90 BPM | TEMPERATURE: 98.2 F | BODY MASS INDEX: 32.25 KG/M2 | HEIGHT: 63 IN | OXYGEN SATURATION: 93 %

## 2022-09-12 DIAGNOSIS — Z88.9 MULTIPLE ALLERGIES: ICD-10-CM

## 2022-09-12 DIAGNOSIS — E78.2 MIXED HYPERLIPIDEMIA: ICD-10-CM

## 2022-09-12 DIAGNOSIS — J45.20 MILD INTERMITTENT ASTHMA WITHOUT COMPLICATION: ICD-10-CM

## 2022-09-12 PROCEDURE — 99214 OFFICE O/P EST MOD 30 MIN: CPT | Performed by: NURSE PRACTITIONER

## 2022-09-12 RX ORDER — FLUTICASONE PROPIONATE AND SALMETEROL 250; 50 UG/1; UG/1
1 POWDER RESPIRATORY (INHALATION) 2 TIMES DAILY
COMMUNITY
Start: 2022-08-31

## 2022-09-12 ASSESSMENT — FIBROSIS 4 INDEX: FIB4 SCORE: 1.09

## 2022-09-12 NOTE — ASSESSMENT & PLAN NOTE
Chronic, ongoing.  Continues to see allergy/asthma specialist every 6 months.  Continues Wixela inhaler twice daily, has albuterol rescue inhaler as needed, which is not often.  At her last appointment with her specialist they are requesting PCP to order a pulmonary function test.

## 2022-09-13 PROBLEM — J34.89 SINUS PAIN: Status: RESOLVED | Noted: 2022-04-12 | Resolved: 2022-09-13

## 2022-09-13 NOTE — PROGRESS NOTES
CC:   Chief Complaint   Patient presents with    Lab Results    Orders Needed     PFT     HISTORY OF THE PRESENT ILLNESS: Patient is a 72 y.o. female. This pleasant patient is here today to review lab results and request orders for PFT.    Health Maintenance: Reviewed, Mammogram scheduled 9/17/22.    Mild intermittent asthma without complication  Chronic, ongoing.  Continues to see allergy/asthma specialist every 6 months.  Continues Wixela inhaler twice daily, has albuterol rescue inhaler as needed, which is not often.  At her last appointment with her specialist they are requesting PCP to order a pulmonary function test.    Multiple allergies  Chronic, ongoing.  Continues to see allergy/asthma specialist every 6 months.  Continues fexofenadine 60 mg daily, fluticasone nasal spray daily, Wixela inhaler twice daily, has albuterol rescue inhaler as needed, which is not often.  At her last appointment with her specialist they are requesting PCP to order a pulmonary function test.    Mixed hyperlipidemia  Chronic, ongoing.  Continues Tricor 145 mg daily, brand-name only, denies side effects of the medication.  Will be due for annual labs in July 2023.    Allergies: Cephalexin, Fenofibrate, Food, and Levaquin  Current Outpatient Medications Ordered in Epic   Medication Sig Dispense Refill    WIXELA INHUB 250-50 MCG/ACT AEROSOL POWDER, BREATH ACTIVATED Inhale 1 Puff 2 times a day.      TRICOR 145 MG Tab Take 1 Tablet by mouth every day. 90 Tablet 3    Calcium Carbonate-Vitamin D (CALTRATE 600+D PO) Take  by mouth.      Multiple Vitamins-Minerals (CENTRUM SILVER) Chew Tab Take  by mouth.      fluticasone (FLONASE) 50 MCG/ACT nasal spray Spray 1 Spray in nose every day.      fexofenadine (ALLEGRA) 60 MG TABS Take 60 mg by mouth every day.       No current [facility]-ordered facility-administered medications on file.     Past Medical History:   Diagnosis Date    Arthritis     ASTHMA     Breath shortness     with exertion     Factor 5 Leiden mutation, heterozygous (HCC)     Hypercholesterolemia     Warthin's tumor 2020     Past Surgical History:   Procedure Laterality Date    JOSELITO BY LAPAROSCOPY N/A 2015    Procedure: JOSELITO BY LAPAROSCOPY;  Surgeon: Grace Montgomery M.D.;  Location: SURGERY [facility];  Service:     GYN SURGERY      hysterectomy    ABDOMINAL HYSTERECTOMY TOTAL      NASAL POLYPECTOMY       Social History     Tobacco Use    Smoking status: Former     Packs/day: 1.00     Years: 25.00     Pack years: 25.00     Types: Cigarettes     Start date: 1975     Quit date: 2000     Years since quittin.3    Smokeless tobacco: Never   Vaping Use    Vaping Use: Never used   Substance Use Topics    Alcohol use: Never     Comment: occasional     Drug use: No     Social History     Social History Narrative    Not on file     Family History   Problem Relation Age of Onset    Cancer Mother         lymphoma    Stroke Father     Hypertension Father     Cancer Sister         colon    Heart Disease Brother 50        CHF    Other Daughter         Factor V    No Known Problems Maternal Grandmother     No Known Problems Maternal Grandfather     No Known Problems Paternal Grandmother     No Known Problems Paternal Grandfather     Hypertension Sister     Heart Disease Sister         stent    Leukemia Brother     Other Brother         Factor V    No Known Problems Brother     Heart Disease Brother         open heart surgery    No Known Problems Brother     Pulmonary Embolism Brother      ROS:   Constitutional: No fevers, chills, malaise/fatigue.  Eyes: No eye pain.  ENT: No sore throat, congestion.   Resp: No cough, shortness of breath.  CV: No chest pain, leg swelling, palpitations.  GI: No nausea/vomiting, abdominal pain, constipation, diarrhea.  : No dysuria, hematuria.  MSK: No weakness.  Skin: No rashes.  Neuro: No dizziness, weakness, headaches.  Psych: No suicidal ideations.    All remaining systems reviewed and  "found to be negative, except as stated above.        Exam: /60 (BP Location: Left arm, Patient Position: Sitting, BP Cuff Size: Large adult)   Pulse 90   Temp 36.8 °C (98.2 °F) (Temporal)   Ht 1.6 m (5' 3\")   Wt 82.6 kg (182 lb)   SpO2 93%  Body mass index is 32.24 kg/m².    General: Well nourished, well developed female in NAD, awake and conversant.  Eyes: Normal conjunctiva, anicteric.  Round symmetrical pupils.  ENT: Hearing grossly intact.  No nasal discharge.  Neck: Neck is supple.  No masses or thyromegaly.  CV: No lower extremity edema.  Respiratory: Respirations are nonlabored.  No wheezing.  Abdomen: Non-Distended.  Skin: Warm.  No rashes or ulcers.  MSK: Normal ambulation.  No clubbing or cyanosis.  Neuro: Sensation and CN II-XII grossly normal.  Psych: Alert and oriented.  Cooperative, appropriate mood and affect, normal judgment.      Assessment/Plan:  1. Mild intermittent asthma without complication  2. Multiple allergies  Chronic, ongoing.  Continue to follow with allergy and asthma specialist.  Continue medications as prescribed.  Orders for pulmonary function tests provided, follow-up to review with specialist.  - PULMONARY FUNCTION TESTS -Test requested: Complete Pulmonary Function Test; Include MIPS/MEPS? Yes; Future    3. Mixed hyperlipidemia  Chronic, ongoing.  Continue Tricor 145 mg daily, does not need a refill at this time.  Due for annual labs in July 2023.    Educated in proper administration of medication(s) ordered today including safety, possible SE, risks, benefits, rationale and alternatives to therapy.   Supportive care, differential diagnoses, and indications for immediate follow-up discussed with patient.    Pathogenesis of diagnosis discussed including typical length and natural progression.    Instructed to return to clinic or nearest emergency department for any change in condition, further concerns, or worsening of symptoms.  Patient states understanding of the plan of " care and discharge instructions.    Return in about 5 months (around 2/1/2023) for HC/PCP Annual Wellness Visit- Medicare, As needed.    Please note that this dictation was created using voice recognition software. I have made every reasonable attempt to correct obvious errors, but I expect that there are errors of grammar and possibly content that I did not discover before finalizing the note.

## 2022-09-13 NOTE — ASSESSMENT & PLAN NOTE
Chronic, ongoing.  Continues to see allergy/asthma specialist every 6 months.  Continues fexofenadine 60 mg daily, fluticasone nasal spray daily, Wixela inhaler twice daily, has albuterol rescue inhaler as needed, which is not often.  At her last appointment with her specialist they are requesting PCP to order a pulmonary function test.

## 2022-09-13 NOTE — ASSESSMENT & PLAN NOTE
Chronic, ongoing.  Continues Tricor 145 mg daily, brand-name only, denies side effects of the medication.  Will be due for annual labs in July 2023.

## 2022-10-11 ENCOUNTER — NON-PROVIDER VISIT (OUTPATIENT)
Dept: SLEEP MEDICINE | Facility: MEDICAL CENTER | Age: 72
End: 2022-10-11
Attending: NURSE PRACTITIONER
Payer: MEDICARE

## 2022-10-11 VITALS — WEIGHT: 183 LBS | HEIGHT: 63 IN | BODY MASS INDEX: 32.43 KG/M2

## 2022-10-11 DIAGNOSIS — J45.20 MILD INTERMITTENT ASTHMA WITHOUT COMPLICATION: ICD-10-CM

## 2022-10-11 DIAGNOSIS — Z88.9 MULTIPLE ALLERGIES: ICD-10-CM

## 2022-10-11 PROCEDURE — 94729 DIFFUSING CAPACITY: CPT | Performed by: INTERNAL MEDICINE

## 2022-10-11 PROCEDURE — 94060 EVALUATION OF WHEEZING: CPT | Performed by: INTERNAL MEDICINE

## 2022-10-11 PROCEDURE — 94726 PLETHYSMOGRAPHY LUNG VOLUMES: CPT | Performed by: INTERNAL MEDICINE

## 2022-10-11 ASSESSMENT — PULMONARY FUNCTION TESTS
FEV1/FVC_PREDICTED: 78
FEV1_LLN: 1.78
FVC_LLN: 2.30
FEV1/FVC_PERCENT_PREDICTED: 101
FEV1/FVC_PERCENT_PREDICTED: 101
FEV1/FVC_PERCENT_PREDICTED: 78
FVC_PERCENT_PREDICTED: 84
FEV1/FVC_PERCENT_LLN: 65
FEV1/FVC: 79
FEV1: 1.83
FVC: 2.28
FEV1/FVC_PERCENT_CHANGE: 0
FEV1/FVC_PERCENT_CHANGE: 0
FEV1_PREDICTED: 2.14
FEV1_PERCENT_PREDICTED: 84
FEV1_PERCENT_CHANGE: -1
FVC_PREDICTED: 2.75
FEV1/FVC: 79.39
FEV1/FVC: 79
FEV1_PERCENT_PREDICTED: 85
FVC: 2.31
FEV1_PERCENT_CHANGE: 0
FEV1/FVC_PERCENT_PREDICTED: 101
FEV1: 1.81
FEV1/FVC_PERCENT_PREDICTED: 100
FVC_PERCENT_PREDICTED: 83
FEV1/FVC: 79

## 2022-10-11 ASSESSMENT — FIBROSIS 4 INDEX: FIB4 SCORE: 1.09

## 2022-10-11 NOTE — PROCEDURES
Tech: So Esquivel, RT  Good patient effort & cooperation.  Test was performed on the [employer] Body Plethysmograph- Elite DX system.  The predicted sets used for Spirometry are GLI-2012, for Lung Volumes are ITS, and for DLCO is GLI 2017.  The results of this test meet the ATS standards for acceptability and repeatability.  The DLCO was uncorrected for Hb.  A bronchodilator of Ventolin HFA 2 puffs via spacer was administered.  DLCO was performed during dilation period.    Interpretation:     This exam is performed with a primary diagnosis of asthma to help establish a pre-test probability of the patient’s diagnostic findings.    The Flow Volume Loop is of sufficient quality and the volume-time graph demonstrates an adequate exhalation to provide a confident interpretation.    The FEV1/FVC ratio is normal by GOLD criteria and confidence interval data indicating an absence of irreversible obstructive lung disease (i.e. no evidence of COPD).  This is accompanied by a normal FEV1 and reduced FVC based on predicted values. After administration of 2 puffs of albuterol, the patient did not achieve a significant bronchodilator response (12% and 200 ml in FEV1 or FVC).    Lung volumes are within the limits of predicted values effectively ruling out restrictive lung disease. The DLCO is normal.    Impression:   No evidence of airflow obstruction   This test does not rule out reactive airways disease. Suggest clinical correlation.

## 2022-11-07 ENCOUNTER — PATIENT MESSAGE (OUTPATIENT)
Dept: HEALTH INFORMATION MANAGEMENT | Facility: OTHER | Age: 72
End: 2022-11-07

## 2023-01-17 ENCOUNTER — OFFICE VISIT (OUTPATIENT)
Dept: MEDICAL GROUP | Facility: PHYSICIAN GROUP | Age: 73
End: 2023-01-17
Payer: MEDICARE

## 2023-01-17 VITALS
OXYGEN SATURATION: 95 % | SYSTOLIC BLOOD PRESSURE: 138 MMHG | WEIGHT: 187.2 LBS | HEART RATE: 94 BPM | DIASTOLIC BLOOD PRESSURE: 78 MMHG | HEIGHT: 63 IN | TEMPERATURE: 97.9 F | BODY MASS INDEX: 33.17 KG/M2

## 2023-01-17 DIAGNOSIS — M85.89 OSTEOPENIA OF MULTIPLE SITES: ICD-10-CM

## 2023-01-17 DIAGNOSIS — Z88.9 MULTIPLE ALLERGIES: ICD-10-CM

## 2023-01-17 DIAGNOSIS — Z00.00 MEDICARE ANNUAL WELLNESS VISIT, SUBSEQUENT: ICD-10-CM

## 2023-01-17 DIAGNOSIS — R59.0 ENLARGED LYMPH NODE IN NECK: ICD-10-CM

## 2023-01-17 DIAGNOSIS — J45.20 MILD INTERMITTENT ASTHMA WITHOUT COMPLICATION: ICD-10-CM

## 2023-01-17 DIAGNOSIS — E78.2 MIXED HYPERLIPIDEMIA: ICD-10-CM

## 2023-01-17 DIAGNOSIS — Z00.00 ROUTINE HEALTH MAINTENANCE: ICD-10-CM

## 2023-01-17 DIAGNOSIS — E66.9 OBESITY (BMI 30-39.9): ICD-10-CM

## 2023-01-17 PROCEDURE — G0439 PPPS, SUBSEQ VISIT: HCPCS | Performed by: NURSE PRACTITIONER

## 2023-01-17 ASSESSMENT — ENCOUNTER SYMPTOMS: GENERAL WELL-BEING: GOOD

## 2023-01-17 ASSESSMENT — ACTIVITIES OF DAILY LIVING (ADL): BATHING_REQUIRES_ASSISTANCE: 0

## 2023-01-17 ASSESSMENT — FIBROSIS 4 INDEX: FIB4 SCORE: 1.09

## 2023-01-17 ASSESSMENT — PATIENT HEALTH QUESTIONNAIRE - PHQ9: CLINICAL INTERPRETATION OF PHQ2 SCORE: 0

## 2023-01-17 NOTE — PROGRESS NOTES
Chief Complaint   Patient presents with    Annual Exam     HPI:  Greta Tran is a 72 y.o. here for Medicare Annual Wellness Visit     Osteopenia of multiple sites  Chronic, ongoing.  Continues vitamin D and calcium supplement daily.  Continues weightbearing exercise daily on her elliptical.  Due for repeat DEXA in August 2023.    Obesity (BMI 30-39.9)  Chronic, ongoing. Reports that she is exercising regularly, would like to increase. Hard to be consistent in the winter as well as during the school season. Does use her elliptical daily. Diet is okay, scrambled eggs for breakfast, she does eat little tacos, but tries to choose healthy foods. Due for annual labs in July 2023.    Multiple allergies  Chronic, ongoing. Continues to follow with allergy and asthma specialist, next appointment in February 2023.  Continues Allegra 60 mg daily and fluticasone nasal spray daily.    Mixed hyperlipidemia  Chronic, ongoing.  Continues Tricor 145 mg daily, brand-name only, denies side effects of the medication.  Due for annual labs in July 2023.    Mild intermittent asthma without complication  Chronic, ongoing. Continues wixela 250-50 mcg/act 1 puff twice daily. Has albuterol rescue inhaler but does not need it often.  Continues to follow with allergy and asthma specialist.    Enlarged lymph node in neck  Chronic, stable.  Patient has been evaluated by ENT and had a biopsy which is benign.  Denies that the lymph node has enlarged and is not painful.     Patient Active Problem List    Diagnosis Date Noted    Left foot pain 01/24/2022    Osteoarthritis of left shoulder 06/21/2021    Enlarged lymph node in neck 07/30/2020    Obesity (BMI 30-39.9) 01/08/2020    Osteopenia of multiple sites 05/08/2019    Mild intermittent asthma without complication 01/23/2017    Multiple allergies 01/23/2017    Mixed hyperlipidemia 01/23/2017     Current Outpatient Medications   Medication Sig Dispense Refill    WIXELA INHUB 250-50 MCG/ACT  AEROSOL POWDER, BREATH ACTIVATED Inhale 1 Puff 2 times a day.      TRICOR 145 MG Tab Take 1 Tablet by mouth every day. 90 Tablet 3    Calcium Carbonate-Vitamin D (CALTRATE 600+D PO) Take  by mouth.      Multiple Vitamins-Minerals (CENTRUM SILVER) Chew Tab Take  by mouth.      fluticasone (FLONASE) 50 MCG/ACT nasal spray Spray 1 Spray in nose every day.      fexofenadine (ALLEGRA) 60 MG TABS Take 60 mg by mouth every day.       No current facility-administered medications for this visit.          Current supplements as per medication list.     Allergies: Cephalexin, Fenofibrate, Food, and Levaquin    Current social contact/activities: get together with friend's and family     She  reports that she quit smoking about 22 years ago. Her smoking use included cigarettes. She started smoking about 48 years ago. She has a 25.00 pack-year smoking history. She has never used smokeless tobacco. She reports that she does not drink alcohol and does not use drugs.  Counseling given: Yes    ROS:    Gait: Uses no assistive device  Ostomy: No  Other tubes: No  Amputations: no  Chronic oxygen use: No  Last eye exam: 2021  Wears hearing aids: No   : NO    Screening:    Depression Screening  Little interest or pleasure in doing things?  0 - not at all  Feeling down, depressed , or hopeless? 0 - not at all  Patient Health Questionnaire Score: 0     If depressive symptoms identified deferred to follow up visit unless specifically addressed in assessment and plan.    Interpretation of PHQ-9 Total Score   Score Severity   1-4 No Depression   5-9 Mild Depression   10-14 Moderate Depression   15-19 Moderately Severe Depression   20-27 Severe Depression    Screening for Cognitive Impairment  Three Minute Recall (daughter, heaven, mountain) 3/3    Nate clock face with all 12 numbers and set the hands to show 10 past 11.  Yes    Cognitive concerns identified deferred for follow up unless specifically addressed in assessment and plan.    Fall  Risk Assessment  Has the patient had two or more falls in the last year or any fall with injury in the last year?  No    Safety Assessment  Throw rugs on floor.  No  Handrails on all stairs.  No  Good lighting in all hallways.  Yes  Difficulty hearing.  No  Patient counseled about all safety risks that were identified.    Functional Assessment ADLs  Are there any barriers preventing you from cooking for yourself or meeting nutritional needs?  No.    Are there any barriers preventing you from driving safely or obtaining transportation?  No.    Are there any barriers preventing you from using a telephone or calling for help?  No.    Are there any barriers preventing you from shopping?  No.    Are there any barriers preventing you from taking care of your own finances?  No.    Are there any barriers preventing you from managing your medications?  No.    Are there any barriers preventing you from showering, bathing or dressing yourself?No.    Are you currently engaging in any exercise or physical activity?  Yes.     What is your perception of your health?  Good    Advance Care Planning  Do you have an Advance Directive, Living Will, Durable Power of , or POLST? No    Health Maintenance Summary            Overdue - COVID-19 Vaccine (3 - Booster for Pfizer series) Overdue since 12/24/2021      10/29/2021  Imm Admin: PFIZER PURPLE CAP SARS-COV-2 VACCINATION (12+)    10/08/2021  Imm Admin: PFIZER PURPLE CAP SARS-COV-2 VACCINATION (12+)              COLORECTAL CANCER SCREENING (COLONOSCOPY - Every 3 Years) Next due on 6/24/2023 06/24/2020  REFERRAL TO GI FOR COLONOSCOPY    04/29/2015  REFERRAL TO GI FOR COLONOSCOPY    04/13/2011  REFERRAL TO GI FOR COLONOSCOPY    01/03/2007  REFERRAL TO GI FOR COLONOSCOPY              BONE DENSITY (Every 2 Years) Next due on 8/4/2023 08/04/2021  DS-BONE DENSITY STUDY (DEXA)    05/08/2019  DS-BONE DENSITY STUDY (DEXA)              Annual Wellness Visit (Every 366 Days) Next  due on 1/18/2024 01/17/2023  Visit Dx: Medicare annual wellness visit, subsequent    01/17/2023  Subsequent Annual Wellness Visit - Includes PPPS ()    07/06/2022  Visit Dx: Medicare annual wellness visit, subsequent    07/27/2021  Subsequent Annual Wellness Visit - Includes PPPS ()    07/27/2021  Visit Dx: Medicare annual wellness visit, subsequent    Only the first 5 history entries have been loaded, but more history exists.              MAMMOGRAM (Every 2 Years) Next due on 9/17/2024 09/17/2022  MA-SCREENING MAMMO BILAT W/TOMOSYNTHESIS W/CAD    09/11/2021  [id number]    09/22/2020  [id number]    09/06/2019  [id number]    09/08/2018  [id number]              IMM DTaP/Tdap/Td Vaccine (2 - Td or Tdap) Next due on 6/29/2029 06/29/2019  Imm Admin: Tdap Vaccine              HEPATITIS C SCREENING  Completed      06/25/2019  HEP C VIRUS ANTIBODY              IMM PNEUMOCOCCAL VACCINE: 65+ Years (Series Information) Completed      01/08/2020  Imm Admin: Pneumococcal polysaccharide vaccine (PPSV-23)    11/01/2018  Imm Admin: Pneumococcal Conjugate Vaccine (Prevnar/PCV-13)    10/13/2017  Imm Admin: Pneumococcal Conjugate Vaccine (Prevnar/PCV-13)              IMM INFLUENZA (Series Information) Completed      10/06/2022  Imm Admin: Influenza Vaccine Adult HD    10/06/2022  Imm Admin: Influenza Vaccine Adult HD    09/25/2021  Imm Admin: Influenza Vaccine Adult HD    11/16/2020  Imm Admin: Influenza Vaccine Adult HD    10/25/2019  Imm Admin: Influenza Vaccine Adult HD    Only the first 5 history entries have been loaded, but more history exists.              IMM MENINGOCOCCAL ACWY VACCINE (Series Information) Aged Out      No completion history exists for this topic.              Discontinued - IMM HEP B VACCINE  Discontinued      No completion history exists for this topic.              Discontinued - IMM ZOSTER VACCINES  Discontinued      07/31/2019  Imm  Admin: Zoster Vaccine Recombinant (RZV) (SHINGRIX)    10/07/2014  Imm Admin: Zoster Vaccine Live (ZVL) (Zostavax) - HISTORICAL DATA                  Patient Care Team:  LESLYE Almazan as PCP - General (Family Medicine)  Cornelia Ruffin M.D. as Consulting Physician (Allergy and Immunology)    Social History     Tobacco Use    Smoking status: Former     Packs/day: 1.00     Years: 25.00     Pack years: 25.00     Types: Cigarettes     Start date: 1975     Quit date: 2000     Years since quittin.6    Smokeless tobacco: Never   Vaping Use    Vaping Use: Never used   Substance Use Topics    Alcohol use: Never     Comment: occasional     Drug use: No     Family History   Problem Relation Age of Onset    Cancer Mother         lymphoma    Stroke Father     Hypertension Father     Cancer Sister         colon    Heart Disease Brother 50        CHF    Other Daughter         Factor V    No Known Problems Maternal Grandmother     No Known Problems Maternal Grandfather     No Known Problems Paternal Grandmother     No Known Problems Paternal Grandfather     Hypertension Sister     Heart Disease Sister         stent    Leukemia Brother     Other Brother         Factor V    No Known Problems Brother     Heart Disease Brother         open heart surgery    No Known Problems Brother     Pulmonary Embolism Brother      She  has a past medical history of Arthritis, ASTHMA, Breath shortness, Factor 5 Leiden mutation, heterozygous (HCC), Hypercholesterolemia, and Warthin's tumor (2020).     Past Surgical History:   Procedure Laterality Date    JOSELITO BY LAPAROSCOPY N/A 2015    Procedure: JOSELITO BY LAPAROSCOPY;  Surgeon: Grace Montgomery M.D.;  Location: SURGERY [facility];  Service:     GYN SURGERY      hysterectomy    ABDOMINAL HYSTERECTOMY TOTAL      NASAL POLYPECTOMY       Exam:   /78 (BP Location: Right arm, Patient Position: Sitting, BP Cuff Size: Adult)   Pulse 94   Temp 36.6 °C (97.9  "°F) (Temporal)   Ht 1.6 m (5' 3\")   Wt 84.9 kg (187 lb 3.2 oz)   SpO2 95%  Body mass index is 33.16 kg/m².    Hearing fair.    Dentition good  Alert, oriented in no acute distress.  Eye contact is good, speech goal directed, affect calm    General: Normal appearing. No distress.  HEENT: Normocephalic. Eyes conjunctiva clear lids without ptosis, pupils equal and reactive to light accommodation, ears normal shape and contour, canals are clear bilaterally, tympanic membranes are benign, nasal mucosa benign, oropharynx is without erythema, edema or exudates. Sinuses (frontal and maxillary) nontender to palpation.  Neck: Supple without JVD or bruit. Thyroid is not enlarged.  Pulmonary: Clear to ausculation.  Normal effort. No rales, rhonchi, or wheezing.  Cardiovascular: Regular rate and rhythm without murmur. Carotid and radial pulses are intact and equal bilaterally.  Abdomen: Soft, nontender, nondistended. Normal bowel sounds.   Neurologic: Grossly nonfocal.  Lymph: Enlarged right neck lymph node, nontender, mobile.  No cervical, supraclavicular or axillary lymph nodes are palpable.  Skin: Warm and dry.  No obvious lesions.  Musculoskeletal: Normal gait. No extremity cyanosis, clubbing, or edema.  Psych: Normal mood and affect. Alert and oriented x3. Judgment and insight is normal.     Assessment and Plan. The following treatment and monitoring plan is recommended:    1. Medicare annual wellness visit, subsequent  - Subsequent Annual Wellness Visit - Includes PPPS ()    2. Mixed hyperlipidemia  Chronic, ongoing.  Continue Tricor 145 mg daily, does not need refill at this time.  Due for updated annual labs in July 2023 prior to follow-up.  - Comp Metabolic Panel; Future  - Lipid Profile; Future  - TSH WITH REFLEX TO FT4; Future    3. Obesity (BMI 30-39.9)  Encourage diet high in fruits, vegetables, and fiber. And a diet low in salt, refined carbohydrates, cholesterol, saturated fat, and trans fatty acids.  "   Encourage a minimum of 30 minutes of moderate intensity aerobic exercise (eg, brisk walking) is recommended on five days each week. Or 30 minutes of vigorous-intensity aerobic exercise (eg, jogging) on three days each week.   Patient's body mass index is 33.16 kg/m². Exercise and nutrition counseling were performed at this visit.  Due for updated annual labs in July 2023 prior to follow-up.  - CBC WITH DIFFERENTIAL; Future  - Comp Metabolic Panel; Future  - Lipid Profile; Future  - TSH WITH REFLEX TO FT4; Future    4. Mild intermittent asthma without complication  Chronic, ongoing.  Continue to follow with asthma and allergy specialist.  Continue Wixela 250-50 MCG/ACT 1 puff twice daily and albuterol as needed.  Due for updated annual labs in July 2023 prior to follow-up.  - CBC WITH DIFFERENTIAL; Future  - TSH WITH REFLEX TO FT4; Future    5. Multiple allergies  Chronic, ongoing.  Continue to follow with asthma and allergy specialist.  Continue Allegra 60 mg daily and fluticasone nasal spray daily. Due for updated annual labs in July 2023 prior to follow-up.  - CBC WITH DIFFERENTIAL; Future    6. Enlarged lymph node in neck  Chronic, stable.  Continue follow-up with ENT only as needed.    7. Osteopenia of multiple sites  Chronic, ongoing. Encourage patient to take vitamin d and calcium supplement daily. Encourage a minimum of 150 minutes of weight bearing exercises weekly. Due for updated DEXA August 2023. Due for updated annual labs in July 2023 prior to follow-up.   - CBC WITH DIFFERENTIAL; Future  - TSH WITH REFLEX TO FT4; Future    8. Routine health maintenance  Due for updated annual labs in July 2023 prior to follow-up.  - CBC WITH DIFFERENTIAL; Future  - Comp Metabolic Panel; Future  - Lipid Profile; Future  - TSH WITH REFLEX TO FT4; Future     Services suggested: No services needed at this time  Health Care Screening: Age-appropriate preventive services recommended by USPTF and ACIP covered by Medicare  were discussed today. Services ordered if indicated and agreed upon by the patient.  Referrals offered: Community-based lifestyle interventions to reduce health risks and promote self-management and wellness, fall prevention, nutrition, physical activity, tobacco-use cessation, weight loss, and mental health services as per orders if indicated.    Discussion today about general wellness and lifestyle habits:    Prevent falls and reduce trip hazards; Cautioned about securing or removing rugs.  Have a working fire alarm and carbon monoxide detector;   Engage in regular physical activity and social activities     Follow-up: Return in about 6 months (around 7/13/2023) for Follow up Labs.    Please note that this dictation was created using voice recognition software. I have worked with consultants from the vendor as well as technical experts from [employer] [facility] [employer] to optimize the interface. I have made every reasonable attempt to correct obvious errors, but I expect that there are errors of grammar and possibly content that I did not discover before finalizing the note.

## 2023-01-18 NOTE — ASSESSMENT & PLAN NOTE
Chronic, stable.  Patient has been evaluated by ENT and had a biopsy which is benign.  Denies that the lymph node has enlarged and is not painful.

## 2023-01-18 NOTE — ASSESSMENT & PLAN NOTE
Chronic, ongoing. Reports that she is exercising regularly, would like to increase. Hard to be consistent in the winter as well as during the school season. Does use her elliptical daily. Diet is okay, scrambled eggs for breakfast, she does eat little tacos, but tries to choose healthy foods. Due for annual labs in July 2023.

## 2023-01-18 NOTE — ASSESSMENT & PLAN NOTE
Chronic, ongoing. Continues wixela 250-50 mcg/act 1 puff twice daily. Has albuterol rescue inhaler but does not need it often.  Continues to follow with allergy and asthma specialist.

## 2023-01-18 NOTE — ASSESSMENT & PLAN NOTE
Chronic, ongoing.  Continues Tricor 145 mg daily, brand-name only, denies side effects of the medication.  Due for annual labs in July 2023.

## 2023-01-18 NOTE — ASSESSMENT & PLAN NOTE
Chronic, ongoing.  Continues vitamin D and calcium supplement daily.  Continues weightbearing exercise daily on her elliptical.  Due for repeat DEXA in August 2023.

## 2023-01-18 NOTE — ASSESSMENT & PLAN NOTE
Chronic, ongoing. Continues to follow with allergy and asthma specialist, next appointment in February 2023.  Continues Allegra 60 mg daily and fluticasone nasal spray daily.

## 2023-02-19 ENCOUNTER — OFFICE VISIT (OUTPATIENT)
Dept: URGENT CARE | Facility: PHYSICIAN GROUP | Age: 73
End: 2023-02-19
Payer: MEDICARE

## 2023-02-19 VITALS
HEIGHT: 63 IN | TEMPERATURE: 97.5 F | DIASTOLIC BLOOD PRESSURE: 64 MMHG | WEIGHT: 184 LBS | HEART RATE: 71 BPM | RESPIRATION RATE: 18 BRPM | OXYGEN SATURATION: 97 % | BODY MASS INDEX: 32.6 KG/M2 | SYSTOLIC BLOOD PRESSURE: 124 MMHG

## 2023-02-19 DIAGNOSIS — M25.571 ACUTE RIGHT ANKLE PAIN: ICD-10-CM

## 2023-02-19 PROCEDURE — 99213 OFFICE O/P EST LOW 20 MIN: CPT | Performed by: STUDENT IN AN ORGANIZED HEALTH CARE EDUCATION/TRAINING PROGRAM

## 2023-02-19 ASSESSMENT — FIBROSIS 4 INDEX: FIB4 SCORE: 1.09

## 2023-02-20 NOTE — PROGRESS NOTES
Subjective:   CHIEF COMPLAINT  Chief Complaint   Patient presents with    Ankle Pain     Yesterday when pt woke up she had Rt ankle pain       HPI  Greta Tran is a 72 y.o. female who presents with a chief complaint of right ankle pain since yesterday.  Says she woke up with discomfort.  Symptoms are localized to the lateral ankle.  There is no trauma or injury to explain the symptoms.  Says discomfort is aggravated with ankle eversion and dorsiflexion.  She has taken Tylenol which has provided limited relief of symptoms.  No history of recurrent ankle sprains.  No history of ankle surgery.    REVIEW OF SYSTEMS  General: no fever or chills  GI: no nausea or vomiting  See HPI for further details.    PAST MEDICAL HISTORY   has a past medical history of Arthritis, ASTHMA, Breath shortness, Factor 5 Leiden mutation, heterozygous (HCC), Hypercholesterolemia, and 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    SURGICAL HISTORY   has a past surgical history that includes gyn surgery (1993); nasal polypectomy; shen by laparoscopy (N/A, 6/19/2015); and abdominal hysterectomy total.    ALLERGIES  Allergies   Allergen Reactions    Cephalexin Nausea    Fenofibrate      Tolerates brand name tricor    Food      Raspberry, carrots, green pepper, white potatoes, tomato, pork, turkey, tree nuts (cashew, hazelnut, walnut)    Levaquin Swelling     Shaky, throat swelling, joint pain       CURRENT MEDICATIONS  Home Medications       Reviewed by Pete Geronimo D.O. (Physician) on 02/19/23 at 1612  Med List Status: <None>     Medication Last Dose Status   Calcium Carbonate-Vitamin D (CALTRATE 600+D PO) Taking Active   fexofenadine (ALLEGRA) 60 MG TABS Taking Active   fluticasone (FLONASE) 50 MCG/ACT nasal spray Taking Active   Multiple Vitamins-Minerals (CENTRUM SILVER) Chew Tab Taking Active   TRICOR 145 MG Tab Taking Active   WIXELA INHUB 250-50 MCG/ACT AEROSOL POWDER, BREATH ACTIVATED Taking Active                    SOCIAL HISTORY  Social  "History     Tobacco Use    Smoking status: Former     Packs/day: 1.00     Years: 25.00     Pack years: 25.00     Types: Cigarettes     Start date: 1975     Quit date: 2000     Years since quittin.7    Smokeless tobacco: Never   Vaping Use    Vaping Use: Never used   Substance and Sexual Activity    Alcohol use: Never    Drug use: No    Sexual activity: Yes     Partners: Male     Comment:         FAMILY HISTORY  Family History   Problem Relation Age of Onset    Cancer Mother         lymphoma    Stroke Father     Hypertension Father     Cancer Sister         colon    Heart Disease Brother 50        CHF    Other Daughter         Factor V    No Known Problems Maternal Grandmother     No Known Problems Maternal Grandfather     No Known Problems Paternal Grandmother     No Known Problems Paternal Grandfather     Hypertension Sister     Heart Disease Sister         stent    Leukemia Brother     Other Brother         Factor V    No Known Problems Brother     Heart Disease Brother         open heart surgery    No Known Problems Brother     Pulmonary Embolism Brother           Objective:   PHYSICAL EXAM  VITAL SIGNS: /64 (BP Location: Left arm, Patient Position: Sitting, BP Cuff Size: Adult)   Pulse 71   Temp 36.4 °C (97.5 °F) (Temporal)   Resp 18   Ht 1.6 m (5' 3\")   Wt 83.5 kg (184 lb)   SpO2 97%   BMI 32.59 kg/m²     Gen: no acute distress, normal voice  Psych: normal affect, normal judgement, alert, awake  Skin: dry, intact, moist mucosal membranes  Lungs: CTAB w/ symmetric expansion  CV: RRR w/o murmurs or clicks  MSK: Right ankle: No erythema, ecchymosis or edema.  Decreased range of motion with dorsiflexion and eversion secondary to mild discernible discomfort.  Tenderness palpation along the ATFL without any laxity with anterior drawer test.  No focal bony tenderness to palpation.    Assessment/Plan:     1. Acute right ankle pain  Referral to Orthopedics      No trauma or injury.  " No history of recurrent ankle sprains.  Patient has a history of DJD multiple sites and suspect she has some underlying DJD as source of symptoms.  She has a prescription for a topical anti-inflammatory gel which I instructed her to use daily as needed.  Continue using Tylenol as needed.  Referral was submitted to follow-up with orthopedics.  Return to urgent care any new/worsening symptoms or further questions or concerns.  Patient understood everything discussed.  All questions were answered.      Differential diagnosis, natural history, supportive care, and indications for immediate follow-up discussed. All questions answered. Patient agrees with the plan of care.    Follow-up as needed if symptoms worsen or fail to improve to PCP, Urgent care or Emergency Room.    Please note that this dictation was created using voice recognition software. I have made a reasonable attempt to correct obvious errors, but I expect that there are errors of grammar and possibly content that I did not discover before finalizing the note.

## 2023-04-08 DIAGNOSIS — E78.2 MIXED HYPERLIPIDEMIA: ICD-10-CM

## 2023-04-09 RX ORDER — FENOFIBRATE 145 MG/1
145 TABLET ORAL
Qty: 90 TABLET | Refills: 3 | Status: SHIPPED | OUTPATIENT
Start: 2023-04-09 | End: 2023-12-19 | Stop reason: RX

## 2023-07-06 ENCOUNTER — HOSPITAL ENCOUNTER (OUTPATIENT)
Dept: LAB | Facility: MEDICAL CENTER | Age: 73
End: 2023-07-06
Attending: NURSE PRACTITIONER
Payer: MEDICARE

## 2023-07-06 DIAGNOSIS — J45.20 MILD INTERMITTENT ASTHMA WITHOUT COMPLICATION: ICD-10-CM

## 2023-07-06 DIAGNOSIS — E66.9 OBESITY (BMI 30-39.9): ICD-10-CM

## 2023-07-06 DIAGNOSIS — Z00.00 ROUTINE HEALTH MAINTENANCE: ICD-10-CM

## 2023-07-06 DIAGNOSIS — M85.89 OSTEOPENIA OF MULTIPLE SITES: ICD-10-CM

## 2023-07-06 DIAGNOSIS — E78.2 MIXED HYPERLIPIDEMIA: ICD-10-CM

## 2023-07-06 DIAGNOSIS — Z88.9 MULTIPLE ALLERGIES: ICD-10-CM

## 2023-07-06 LAB
ALBUMIN SERPL BCP-MCNC: 4.3 G/DL (ref 3.2–4.9)
ALBUMIN/GLOB SERPL: 1.4 G/DL
ALP SERPL-CCNC: 49 U/L (ref 30–99)
ALT SERPL-CCNC: 19 U/L (ref 2–50)
ANION GAP SERPL CALC-SCNC: 12 MMOL/L (ref 7–16)
AST SERPL-CCNC: 19 U/L (ref 12–45)
BASOPHILS # BLD AUTO: 0.8 % (ref 0–1.8)
BASOPHILS # BLD: 0.04 K/UL (ref 0–0.12)
BILIRUB SERPL-MCNC: 0.3 MG/DL (ref 0.1–1.5)
BUN SERPL-MCNC: 15 MG/DL (ref 8–22)
CALCIUM ALBUM COR SERPL-MCNC: 9.6 MG/DL (ref 8.5–10.5)
CALCIUM SERPL-MCNC: 9.8 MG/DL (ref 8.5–10.5)
CHLORIDE SERPL-SCNC: 108 MMOL/L (ref 96–112)
CHOLEST SERPL-MCNC: 118 MG/DL (ref 100–199)
CO2 SERPL-SCNC: 24 MMOL/L (ref 20–33)
CREAT SERPL-MCNC: 0.68 MG/DL (ref 0.5–1.4)
EOSINOPHIL # BLD AUTO: 0.26 K/UL (ref 0–0.51)
EOSINOPHIL NFR BLD: 5.5 % (ref 0–6.9)
ERYTHROCYTE [DISTWIDTH] IN BLOOD BY AUTOMATED COUNT: 44.7 FL (ref 35.9–50)
FASTING STATUS PATIENT QL REPORTED: NORMAL
GFR SERPLBLD CREATININE-BSD FMLA CKD-EPI: 93 ML/MIN/1.73 M 2
GLOBULIN SER CALC-MCNC: 3 G/DL (ref 1.9–3.5)
GLUCOSE SERPL-MCNC: 97 MG/DL (ref 65–99)
HCT VFR BLD AUTO: 43.4 % (ref 37–47)
HDLC SERPL-MCNC: 31 MG/DL
HGB BLD-MCNC: 14 G/DL (ref 12–16)
IMM GRANULOCYTES # BLD AUTO: 0.01 K/UL (ref 0–0.11)
IMM GRANULOCYTES NFR BLD AUTO: 0.2 % (ref 0–0.9)
LDLC SERPL CALC-MCNC: 66 MG/DL
LYMPHOCYTES # BLD AUTO: 1.74 K/UL (ref 1–4.8)
LYMPHOCYTES NFR BLD: 36.6 % (ref 22–41)
MCH RBC QN AUTO: 31.5 PG (ref 27–33)
MCHC RBC AUTO-ENTMCNC: 32.3 G/DL (ref 32.2–35.5)
MCV RBC AUTO: 97.7 FL (ref 81.4–97.8)
MONOCYTES # BLD AUTO: 0.38 K/UL (ref 0–0.85)
MONOCYTES NFR BLD AUTO: 8 % (ref 0–13.4)
NEUTROPHILS # BLD AUTO: 2.33 K/UL (ref 1.82–7.42)
NEUTROPHILS NFR BLD: 48.9 % (ref 44–72)
NRBC # BLD AUTO: 0 K/UL
NRBC BLD-RTO: 0 /100 WBC (ref 0–0.2)
PLATELET # BLD AUTO: 239 K/UL (ref 164–446)
PMV BLD AUTO: 9.9 FL (ref 9–12.9)
POTASSIUM SERPL-SCNC: 4.2 MMOL/L (ref 3.6–5.5)
PROT SERPL-MCNC: 7.3 G/DL (ref 6–8.2)
RBC # BLD AUTO: 4.44 M/UL (ref 4.2–5.4)
SODIUM SERPL-SCNC: 144 MMOL/L (ref 135–145)
TRIGL SERPL-MCNC: 105 MG/DL (ref 0–149)
TSH SERPL DL<=0.005 MIU/L-ACNC: 2.18 UIU/ML (ref 0.38–5.33)
WBC # BLD AUTO: 4.8 K/UL (ref 4.8–10.8)

## 2023-07-06 PROCEDURE — 80061 LIPID PANEL: CPT

## 2023-07-06 PROCEDURE — 80053 COMPREHEN METABOLIC PANEL: CPT

## 2023-07-06 PROCEDURE — 85025 COMPLETE CBC W/AUTO DIFF WBC: CPT

## 2023-07-06 PROCEDURE — 36415 COLL VENOUS BLD VENIPUNCTURE: CPT

## 2023-07-06 PROCEDURE — 84443 ASSAY THYROID STIM HORMONE: CPT

## 2023-07-12 ENCOUNTER — OFFICE VISIT (OUTPATIENT)
Dept: MEDICAL GROUP | Facility: PHYSICIAN GROUP | Age: 73
End: 2023-07-12
Payer: MEDICARE

## 2023-07-12 ENCOUNTER — TELEPHONE (OUTPATIENT)
Dept: MEDICAL GROUP | Facility: PHYSICIAN GROUP | Age: 73
End: 2023-07-12

## 2023-07-12 ENCOUNTER — HOSPITAL ENCOUNTER (OUTPATIENT)
Facility: MEDICAL CENTER | Age: 73
End: 2023-07-12
Attending: NURSE PRACTITIONER
Payer: MEDICARE

## 2023-07-12 VITALS
HEART RATE: 94 BPM | OXYGEN SATURATION: 97 % | SYSTOLIC BLOOD PRESSURE: 124 MMHG | BODY MASS INDEX: 32.96 KG/M2 | WEIGHT: 186 LBS | TEMPERATURE: 98.1 F | DIASTOLIC BLOOD PRESSURE: 70 MMHG | HEIGHT: 63 IN

## 2023-07-12 DIAGNOSIS — N30.01 ACUTE CYSTITIS WITH HEMATURIA: ICD-10-CM

## 2023-07-12 LAB
APPEARANCE UR: CLEAR
BILIRUB UR STRIP-MCNC: NEGATIVE MG/DL
COLOR UR AUTO: YELLOW
GLUCOSE UR STRIP.AUTO-MCNC: NEGATIVE MG/DL
KETONES UR STRIP.AUTO-MCNC: NEGATIVE MG/DL
LEUKOCYTE ESTERASE UR QL STRIP.AUTO: NORMAL
NITRITE UR QL STRIP.AUTO: POSITIVE
PH UR STRIP.AUTO: 7.5 [PH] (ref 5–8)
PROT UR QL STRIP: 100 MG/DL
RBC UR QL AUTO: NORMAL
SP GR UR STRIP.AUTO: 1.01
UROBILINOGEN UR STRIP-MCNC: 0.2 MG/DL

## 2023-07-12 PROCEDURE — 99213 OFFICE O/P EST LOW 20 MIN: CPT | Performed by: NURSE PRACTITIONER

## 2023-07-12 PROCEDURE — 3074F SYST BP LT 130 MM HG: CPT | Performed by: NURSE PRACTITIONER

## 2023-07-12 PROCEDURE — 3078F DIAST BP <80 MM HG: CPT | Performed by: NURSE PRACTITIONER

## 2023-07-12 PROCEDURE — 1170F FXNL STATUS ASSESSED: CPT | Performed by: NURSE PRACTITIONER

## 2023-07-12 PROCEDURE — 87086 URINE CULTURE/COLONY COUNT: CPT

## 2023-07-12 PROCEDURE — 81002 URINALYSIS NONAUTO W/O SCOPE: CPT | Performed by: NURSE PRACTITIONER

## 2023-07-12 PROCEDURE — 87077 CULTURE AEROBIC IDENTIFY: CPT

## 2023-07-12 PROCEDURE — 87186 SC STD MICRODIL/AGAR DIL: CPT

## 2023-07-12 RX ORDER — SULFAMETHOXAZOLE AND TRIMETHOPRIM 800; 160 MG/1; MG/1
1 TABLET ORAL 2 TIMES DAILY
Qty: 6 TABLET | Refills: 0 | Status: SHIPPED | OUTPATIENT
Start: 2023-07-12 | End: 2023-07-15

## 2023-07-12 RX ORDER — PHENAZOPYRIDINE HYDROCHLORIDE 200 MG/1
200 TABLET, FILM COATED ORAL 3 TIMES DAILY PRN
Qty: 6 TABLET | Refills: 0 | Status: SHIPPED | OUTPATIENT
Start: 2023-07-12 | End: 2023-07-14

## 2023-07-12 ASSESSMENT — FIBROSIS 4 INDEX: FIB4 SCORE: 1.29

## 2023-07-12 NOTE — TELEPHONE ENCOUNTER
"Message: pt called back after visit today in a lot of pain, pt states she did  medication and took one dose and said she felt worse while urinating she states it feels like \"a string pulling from the inside\" and burning felt worse after. Pt wanted to know if there is anything else she can take.   "

## 2023-07-12 NOTE — PROGRESS NOTES
Subjective:     CC: Painful urination    HPI:   Greta is an established patient of MALI Guidry who presents today with the following:    Painful urination   The dysuria started 2 days ago.  Associated nausea, chills, urinary frequency and urinary retention. Last night was the worse pain. Aggravating factors include none. Alleviating factors include metamucil. She had diarrhea yesterday. Interventions tried include metamucil. No recent UTI. Denies fever, hematuria, vomiting, flank pain. Reports that 1 month ago she did have a colonoscopy      Past Medical History:   Diagnosis Date    Arthritis     ASTHMA     Breath shortness     with exertion    Factor 5 Leiden mutation, heterozygous (HCC)     Hypercholesterolemia     Warthin's tumor 2020       Social History     Tobacco Use    Smoking status: Former     Packs/day: 1.00     Years: 25.00     Pack years: 25.00     Types: Cigarettes     Start date: 1975     Quit date: 2000     Years since quittin.1    Smokeless tobacco: Never   Vaping Use    Vaping Use: Never used   Substance Use Topics    Alcohol use: Never    Drug use: No       Current Outpatient Medications Ordered in Epic   Medication Sig Dispense Refill    sulfamethoxazole-trimethoprim (BACTRIM DS) 800-160 MG tablet Take 1 Tablet by mouth 2 times a day for 3 days. 6 Tablet 0    TRICOR 145 MG Tab TAKE 1 TABLET BY MOUTH EVERY DAY 90 Tablet 3    WIXELA INHUB 250-50 MCG/ACT AEROSOL POWDER, BREATH ACTIVATED Inhale 1 Puff 2 times a day.      Calcium Carbonate-Vitamin D (CALTRATE 600+D PO) Take  by mouth.      Multiple Vitamins-Minerals (CENTRUM SILVER) Chew Tab Take  by mouth.      fluticasone (FLONASE) 50 MCG/ACT nasal spray Spray 1 Spray in nose every day.      fexofenadine (ALLEGRA) 60 MG TABS Take 60 mg by mouth every day.       No current [facility]-ordered facility-administered medications on file.       Allergies:  Cephalexin, Fenofibrate, Food, and Levaquin    Health Maintenance:  "Deferred      Objective:     Vital signs reviewed  Exam:  /70 (BP Location: Left arm, Patient Position: Sitting, BP Cuff Size: Adult)   Pulse 94   Temp 36.7 °C (98.1 °F) (Temporal)   Ht 1.6 m (5' 3\")   Wt 84.4 kg (186 lb)   SpO2 97%   BMI 32.95 kg/m²  Body mass index is 32.95 kg/m².    Gen: Alert and oriented, No apparent distress.  Lungs: Normal effort, CTA bilaterally, no wheezes, rhonchi, or rales. No CVA tenderness  CV: Regular rate and rhythm. No murmurs, rubs, or gallops.  Ext: No clubbing, cyanosis, edema.      Labs:      Latest Reference Range & Units 07/12/23 10:42   POC Color Negative  yellow   POC Appearance Negative  clear   POC Specific Gravity <1.005 - >1.030  1.015   POC Urine PH 5.0 - 8.0  7.5   POC Glucose Negative mg/dL negative   POC Ketones Negative mg/dL negative   POC Protein Negative mg/dL 100   POC Nitrites Negative  positive   POC Leukocyte Esterase Negative  moderate   POC Blood Negative  moderate   POC Bilirubin Negative mg/dL negative   POC Urobiligen Negative (0.2) mg/dL 0.2       Assessment & Plan:     73 y.o. female with the following -     1. Acute cystitis with hematuria  Acute uncomplicated problem.  POC urinalysis positive for nitrates, leukocytes, occult and protein.  No CVA tenderness on exam today.  Vital signs are stable.  Allergies verified.  We will start her on Bactrim twice daily for 3-day course.  We are culturing her urine.  Encouraged staying hydrated, emptying bladder frequently during awake hours, wearing cotton underwear and wiping front to back.  - POCT Urinalysis  - URINE CULTURE(NEW); Future  - sulfamethoxazole-trimethoprim (BACTRIM DS) 800-160 MG tablet; Take 1 Tablet by mouth 2 times a day for 3 days.  Dispense: 6 Tablet; Refill: 0      Return if symptoms worsen or fail to improve.    Please note that this dictation was created using voice recognition software. I have made every reasonable attempt to correct obvious errors, but I expect that there are " errors of grammar and possibly content that I did not discover before finalizing the note.

## 2023-07-13 ENCOUNTER — OFFICE VISIT (OUTPATIENT)
Dept: MEDICAL GROUP | Facility: PHYSICIAN GROUP | Age: 73
End: 2023-07-13
Payer: MEDICARE

## 2023-07-13 VITALS
HEIGHT: 63 IN | DIASTOLIC BLOOD PRESSURE: 72 MMHG | SYSTOLIC BLOOD PRESSURE: 128 MMHG | TEMPERATURE: 97.6 F | BODY MASS INDEX: 33.13 KG/M2 | OXYGEN SATURATION: 95 % | HEART RATE: 79 BPM | WEIGHT: 187 LBS

## 2023-07-13 DIAGNOSIS — E78.2 MIXED HYPERLIPIDEMIA: ICD-10-CM

## 2023-07-13 DIAGNOSIS — M85.89 OSTEOPENIA OF MULTIPLE SITES: ICD-10-CM

## 2023-07-13 DIAGNOSIS — Z78.0 POSTMENOPAUSAL: ICD-10-CM

## 2023-07-13 LAB
AMBIGUOUS DTTM AMBI4: NORMAL
SIGNIFICANT IND 70042: NORMAL
SITE SITE: NORMAL
SOURCE SOURCE: NORMAL

## 2023-07-13 PROCEDURE — 3078F DIAST BP <80 MM HG: CPT | Performed by: NURSE PRACTITIONER

## 2023-07-13 PROCEDURE — 99214 OFFICE O/P EST MOD 30 MIN: CPT | Performed by: NURSE PRACTITIONER

## 2023-07-13 PROCEDURE — 1170F FXNL STATUS ASSESSED: CPT | Performed by: NURSE PRACTITIONER

## 2023-07-13 PROCEDURE — 3074F SYST BP LT 130 MM HG: CPT | Performed by: NURSE PRACTITIONER

## 2023-07-13 ASSESSMENT — FIBROSIS 4 INDEX: FIB4 SCORE: 1.29

## 2023-07-15 LAB
BACTERIA UR CULT: ABNORMAL
BACTERIA UR CULT: ABNORMAL
SIGNIFICANT IND 70042: ABNORMAL
SITE SITE: ABNORMAL
SOURCE SOURCE: ABNORMAL

## 2023-07-17 NOTE — ASSESSMENT & PLAN NOTE
Chronic, ongoing.  Continues vitamin D and calcium supplement daily.  Participates in weightbearing exercises daily on her elliptical.  Due for her follow-up DEXA in August 2023.  Denies recent falls or fractures.

## 2023-07-17 NOTE — ASSESSMENT & PLAN NOTE
Chronic, ongoing.  Continues Tricor 145 mg daily, brand-name only, denies side effects of the medication.  Due for annual labs in July 2024.

## 2023-07-17 NOTE — PROGRESS NOTES
CC: Diagnoses of Mixed hyperlipidemia, Osteopenia of multiple sites, and Postmenopausal were pertinent to this visit.                                                                                                                                       HPI:   Greta presents today with the following concerns:    Mixed hyperlipidemia  Chronic, ongoing.  Continues Tricor 145 mg daily, brand-name only, denies side effects of the medication.  Due for annual labs in July 2024.    Osteopenia of multiple sites  Chronic, ongoing.  Continues vitamin D and calcium supplement daily.  Participates in weightbearing exercises daily on her elliptical.  Due for her follow-up DEXA in August 2023.  Denies recent falls or fractures.    Patient Active Problem List    Diagnosis Date Noted    Left foot pain 01/24/2022    Osteoarthritis of left shoulder 06/21/2021    Enlarged lymph node in neck 07/30/2020    Obesity (BMI 30-39.9) 01/08/2020    Osteopenia of multiple sites 05/08/2019    Mild intermittent asthma without complication 01/23/2017    Multiple allergies 01/23/2017    Mixed hyperlipidemia 01/23/2017     Current Outpatient Medications   Medication Sig Dispense Refill    TRICOR 145 MG Tab TAKE 1 TABLET BY MOUTH EVERY DAY 90 Tablet 3    WIXELA INHUB 250-50 MCG/ACT AEROSOL POWDER, BREATH ACTIVATED Inhale 1 Puff 2 times a day.      Calcium Carbonate-Vitamin D (CALTRATE 600+D PO) Take  by mouth.      Multiple Vitamins-Minerals (CENTRUM SILVER) Chew Tab Take  by mouth.      fluticasone (FLONASE) 50 MCG/ACT nasal spray Spray 1 Spray in nose every day.      fexofenadine (ALLEGRA) 60 MG TABS Take 60 mg by mouth every day.       No current facility-administered medications for this visit.     Allergies as of 07/13/2023 - Reviewed 07/13/2023   Allergen Reaction Noted    Cephalexin Nausea 06/11/2015    Fenofibrate  03/12/2019    Food  06/11/2015    Levaquin Swelling 07/06/2015      ROS:  See HPI    /72 (BP Location: Right arm, Patient  "Position: Sitting, BP Cuff Size: Adult)   Pulse 79   Temp 36.4 °C (97.6 °F) (Temporal)   Ht 1.6 m (5' 3\")   Wt 84.8 kg (187 lb)   SpO2 95%   BMI 33.13 kg/m²     Physical Exam:  General: Well nourished, well developed female in NAD, awake and conversant.  Eyes: Normal conjunctiva, anicteric.  Round symmetrical pupils.  ENT: Hearing grossly intact.  No nasal discharge.  Neck: Neck is supple.  No masses or thyromegaly.  CV: No lower extremity edema.  Respiratory: Respirations are nonlabored.  No wheezing.  Abdomen: Non-Distended.  Skin: Warm.  No rashes or ulcers.  MSK: Normal ambulation.  No clubbing or cyanosis.  Neuro: Sensation and CN II-XII grossly normal.  Psych: Alert and oriented.  Cooperative, appropriate mood and affect, normal judgment.      Assessment and Plan.   73 y.o. female with the following issues:  1. Mixed hyperlipidemia  Chronic, improving.  Continue Tricor 145 mg daily, brand-name only, does not need a refill at this time.  Continue working on healthy diet and regular exercise.  Due for updated annual lipid profile in July 2024.    2. Osteopenia of multiple sites  3. Postmenopausal  Chronic, ongoing. Encourage patient to continue to take vitamin d and calcium supplement daily. Encourage a minimum of 150 minutes of weight bearing exercises weekly. Due for updated DEXA in August 2023.    - DS-BONE DENSITY STUDY (DEXA); Future     Return in about 27 weeks (around 1/18/2024) for AWV.     Please note that this dictation was created using voice recognition software. I have worked with consultants from the vendor as well as technical experts from "[employer] to optimize the interface. I have made every reasonable attempt to correct obvious errors, but I expect that there are errors of grammar and possibly content that I did not discover before finalizing the note.   "

## 2023-08-18 ENCOUNTER — HOSPITAL ENCOUNTER (OUTPATIENT)
Dept: RADIOLOGY | Facility: MEDICAL CENTER | Age: 73
End: 2023-08-18
Attending: NURSE PRACTITIONER
Payer: MEDICARE

## 2023-08-18 DIAGNOSIS — Z78.0 POSTMENOPAUSAL: ICD-10-CM

## 2023-08-18 DIAGNOSIS — M85.89 OSTEOPENIA OF MULTIPLE SITES: ICD-10-CM

## 2023-08-18 PROCEDURE — 77080 DXA BONE DENSITY AXIAL: CPT

## 2023-08-21 ENCOUNTER — APPOINTMENT (OUTPATIENT)
Dept: URGENT CARE | Facility: PHYSICIAN GROUP | Age: 73
End: 2023-08-21
Payer: MEDICARE

## 2023-09-16 PROBLEM — M81.0 OSTEOPOROSIS: Status: ACTIVE | Noted: 2019-05-08

## 2023-12-19 ENCOUNTER — OFFICE VISIT (OUTPATIENT)
Dept: MEDICAL GROUP | Facility: PHYSICIAN GROUP | Age: 73
End: 2023-12-19
Payer: MEDICARE

## 2023-12-19 VITALS
DIASTOLIC BLOOD PRESSURE: 62 MMHG | HEART RATE: 73 BPM | OXYGEN SATURATION: 91 % | TEMPERATURE: 97.5 F | HEIGHT: 63 IN | BODY MASS INDEX: 34.2 KG/M2 | SYSTOLIC BLOOD PRESSURE: 138 MMHG | WEIGHT: 193 LBS | RESPIRATION RATE: 18 BRPM

## 2023-12-19 DIAGNOSIS — E78.2 MIXED HYPERLIPIDEMIA: ICD-10-CM

## 2023-12-19 DIAGNOSIS — M81.0 AGE-RELATED OSTEOPOROSIS WITHOUT CURRENT PATHOLOGICAL FRACTURE: ICD-10-CM

## 2023-12-19 PROCEDURE — 3075F SYST BP GE 130 - 139MM HG: CPT | Performed by: NURSE PRACTITIONER

## 2023-12-19 PROCEDURE — 99214 OFFICE O/P EST MOD 30 MIN: CPT | Performed by: NURSE PRACTITIONER

## 2023-12-19 PROCEDURE — 1170F FXNL STATUS ASSESSED: CPT | Performed by: NURSE PRACTITIONER

## 2023-12-19 PROCEDURE — 3078F DIAST BP <80 MM HG: CPT | Performed by: NURSE PRACTITIONER

## 2023-12-19 RX ORDER — FENOFIBRATE 145 MG/1
145 TABLET, COATED ORAL DAILY
Qty: 90 TABLET | Refills: 3 | Status: SHIPPED | OUTPATIENT
Start: 2023-12-19

## 2023-12-19 RX ORDER — ALBUTEROL SULFATE 90 UG/1
AEROSOL, METERED RESPIRATORY (INHALATION)
COMMUNITY
Start: 2023-10-25

## 2023-12-19 RX ORDER — FENOFIBRATE 145 MG/1
145 TABLET, COATED ORAL DAILY
COMMUNITY
End: 2023-12-19 | Stop reason: SDUPTHER

## 2023-12-19 ASSESSMENT — FIBROSIS 4 INDEX: FIB4 SCORE: 1.29

## 2023-12-19 NOTE — PROGRESS NOTES
CC: Diagnoses of Mixed hyperlipidemia and Age-related osteoporosis without current pathological fracture were pertinent to this visit.                                                                                                                                       HPI:   Greta presents today with the following concerns:    Mixed hyperlipidemia  Chronic, ongoing. She has been taking TriCor 145 mg daily for years, but has been unable to get it from any local pharmacy. Started taking fenofibrate 145 mg/day in the beginning of September 2023. Had an allergic reaction in the past from fenofibrate, but does not recall the reaction. Has not had any reactions since started fenofibrate. Reports that she exercises every morning, goes for a walk every morning with her , considering joining a gym.  Due for updated annual labs in July 2024.    Age-related osteoporosis without current pathological fracture  Chronic, ongoing.  Continues vitamin D and calcium supplement daily.  Participates in weightbearing exercises most days of the week.  Due for next DEXA in August 2025.  Denies recent falls or fractures.    Patient Active Problem List    Diagnosis Date Noted    Left foot pain 01/24/2022    Osteoarthritis of left shoulder 06/21/2021    Enlarged lymph node in neck 07/30/2020    Obesity (BMI 30-39.9) 01/08/2020    Age-related osteoporosis without current pathological fracture 05/08/2019    Mild intermittent asthma without complication 01/23/2017    Multiple allergies 01/23/2017    Mixed hyperlipidemia 01/23/2017     Current Outpatient Medications   Medication Sig Dispense Refill    albuterol 108 (90 Base) MCG/ACT Aero Soln inhalation aerosol INHALE 2 PUFFS BY MOUTH EVERY 4 TO 6 HOURS AS NEEDED      fenofibrate (TRICOR) 145 MG Tab Take 1 Tablet by mouth every day. 90 Tablet 3    WIXELA INHUB 250-50 MCG/ACT AEROSOL POWDER, BREATH ACTIVATED Inhale 1 Puff 2 times a day.      Calcium Carbonate-Vitamin D (CALTRATE 600+D PO)  "Take  by mouth.      Multiple Vitamins-Minerals (CENTRUM SILVER) Chew Tab Take  by mouth.      fluticasone (FLONASE) 50 MCG/ACT nasal spray Spray 1 Spray in nose every day.      fexofenadine (ALLEGRA) 60 MG TABS Take 60 mg by mouth every day.       No current facility-administered medications for this visit.     Allergies as of 12/19/2023 - Reviewed 12/19/2023   Allergen Reaction Noted    Cephalexin Nausea 06/11/2015    Food  06/11/2015    Levaquin Swelling 07/06/2015      ROS:  Constitutional: No fevers, chills, malaise/fatigue.  Eyes: No eye pain.  ENT: No sore throat, congestion.   Resp: No cough, shortness of breath.  CV: No chest pain, leg swelling, palpitations.  GI: No nausea/vomiting, abdominal pain, constipation, diarrhea.  : No dysuria, hematuria.  MSK: No weakness.  Skin: No rashes.  Neuro: No dizziness, weakness, headaches.  Psych: No suicidal ideations.    All remaining systems reviewed and found to be negative, except as stated above.      /62 (BP Location: Left arm, Patient Position: Sitting, BP Cuff Size: Large adult)   Pulse 73   Temp 36.4 °C (97.5 °F) (Temporal)   Resp 18   Ht 1.6 m (5' 3\")   Wt 87.5 kg (193 lb)   SpO2 91%   BMI 34.19 kg/m²     Physical Exam:  General: Well nourished, well developed female in NAD, awake and conversant.  Eyes: Normal conjunctiva, anicteric.  Round symmetrical pupils.  ENT: Hearing grossly intact.  No nasal discharge.  Neck: Neck is supple.  No masses or thyromegaly.  CV: No lower extremity edema.  Respiratory: Respirations are nonlabored.  No wheezing.  Abdomen: Non-Distended.  Skin: Warm.  No rashes or ulcers.  MSK: Normal ambulation.  No clubbing or cyanosis.  Neuro: Sensation and CN II-XII grossly normal.  Psych: Alert and oriented.  Cooperative, appropriate mood and affect, normal judgment.      Assessment and Plan.   73 y.o. female with the following issues:  1. Mixed hyperlipidemia  Chronic, ongoing.  Continue fenofibrate 145 mg daily, refill " sent to pharmacy, allergy removed as she is currently tolerating the medicine.  Due for updated annual labs in July 2024.  - fenofibrate (TRICOR) 145 MG Tab; Take 1 Tablet by mouth every day.  Dispense: 90 Tablet; Refill: 3    2. Age-related osteoporosis without current pathological fracture  Chronic, ongoing. Encourage patient to continue to take vitamin d and calcium supplement daily. Encourage a minimum of 150 minutes of weight bearing exercises weekly. Due for updated DEXA in August 2025.       Return in about 5 weeks (around 1/25/2024) for AWV.     Please note that this dictation was created using voice recognition software. I have worked with consultants from the vendor as well as technical experts from [employer] to optimize the interface. I have made every reasonable attempt to correct obvious errors, but I expect that there are errors of grammar and possibly content that I did not discover before finalizing the note.

## 2023-12-19 NOTE — ASSESSMENT & PLAN NOTE
Chronic, ongoing. She has been taking TriCor 145 mg daily for years, but has been unable to get it from any local pharmacy. Started taking fenofibrate 145 mg/day in the beginning of September 2023. Had an allergic reaction in the past from fenofibrate, but does not recall the reaction. Has not had any reactions since started fenofibrate. Reports that she exercises every morning, goes for a walk every morning with her , considering joining a gym.  Due for updated annual labs in July 2024.

## 2023-12-20 NOTE — ASSESSMENT & PLAN NOTE
Chronic, ongoing.  Continues vitamin D and calcium supplement daily.  Participates in weightbearing exercises most days of the week.  Due for next DEXA in August 2025.  Denies recent falls or fractures.

## 2024-01-22 ENCOUNTER — OFFICE VISIT (OUTPATIENT)
Dept: MEDICAL GROUP | Facility: PHYSICIAN GROUP | Age: 74
End: 2024-01-22
Payer: MEDICARE

## 2024-01-22 VITALS
TEMPERATURE: 96.7 F | SYSTOLIC BLOOD PRESSURE: 134 MMHG | HEART RATE: 85 BPM | RESPIRATION RATE: 24 BRPM | OXYGEN SATURATION: 93 % | DIASTOLIC BLOOD PRESSURE: 78 MMHG | HEIGHT: 63 IN | BODY MASS INDEX: 33.66 KG/M2 | WEIGHT: 190 LBS

## 2024-01-22 DIAGNOSIS — Z00.00 ROUTINE HEALTH MAINTENANCE: ICD-10-CM

## 2024-01-22 DIAGNOSIS — Z00.00 MEDICARE ANNUAL WELLNESS VISIT, SUBSEQUENT: ICD-10-CM

## 2024-01-22 DIAGNOSIS — M81.0 AGE-RELATED OSTEOPOROSIS WITHOUT CURRENT PATHOLOGICAL FRACTURE: ICD-10-CM

## 2024-01-22 DIAGNOSIS — J45.20 MILD INTERMITTENT ASTHMA WITHOUT COMPLICATION: ICD-10-CM

## 2024-01-22 DIAGNOSIS — E66.9 OBESITY (BMI 30-39.9): ICD-10-CM

## 2024-01-22 DIAGNOSIS — E78.2 MIXED HYPERLIPIDEMIA: ICD-10-CM

## 2024-01-22 PROCEDURE — 1170F FXNL STATUS ASSESSED: CPT | Performed by: NURSE PRACTITIONER

## 2024-01-22 PROCEDURE — 3078F DIAST BP <80 MM HG: CPT | Performed by: NURSE PRACTITIONER

## 2024-01-22 PROCEDURE — G0439 PPPS, SUBSEQ VISIT: HCPCS | Performed by: NURSE PRACTITIONER

## 2024-01-22 PROCEDURE — 3075F SYST BP GE 130 - 139MM HG: CPT | Performed by: NURSE PRACTITIONER

## 2024-01-22 ASSESSMENT — ACTIVITIES OF DAILY LIVING (ADL): BATHING_REQUIRES_ASSISTANCE: 0

## 2024-01-22 ASSESSMENT — FIBROSIS 4 INDEX: FIB4 SCORE: 1.29

## 2024-01-22 ASSESSMENT — ENCOUNTER SYMPTOMS: GENERAL WELL-BEING: GOOD

## 2024-01-22 ASSESSMENT — PATIENT HEALTH QUESTIONNAIRE - PHQ9: CLINICAL INTERPRETATION OF PHQ2 SCORE: 0

## 2024-01-22 NOTE — PROGRESS NOTES
Chief Complaint   Patient presents with    Annual Wellness Visit     HPI:  Greta Tran is a 73 y.o. here for Medicare Annual Wellness Visit     Age-related osteoporosis without current pathological fracture  Chronic, ongoing.  Continues vitamin D and calcium supplement daily.  Participates in weightbearing exercises most days of the week.  Denies recent falls or fractures.  Due for next updated DEXA in August 2025.    Mild intermittent asthma without complication  Chronic, ongoing.  Continues Wixela 250-50 mcg/ACT 1 puff twice daily and albuterol rescue inhaler as needed.  Continues to follow with allergy and asthma specialist.    Mixed hyperlipidemia  Chronic, ongoing.  Continues fenofibrate 145 mg daily, has not had any side effects or allergic reaction since starting generic in September 2023.  Reports that she exercises every morning and tries hard to follow a healthy diet but struggles to lose weight.  Due for annual labs in July 2024.    Obesity (BMI 30-39.9)  Chronic, ongoing.  Patient reports that she exercises every morning and tries hard to follow a healthy diet but continues to struggle to lose weight.     Patient Active Problem List    Diagnosis Date Noted    Left foot pain 01/24/2022    Osteoarthritis of left shoulder 06/21/2021    Enlarged lymph node in neck 07/30/2020    Obesity (BMI 30-39.9) 01/08/2020    Age-related osteoporosis without current pathological fracture 05/08/2019    Mild intermittent asthma without complication 01/23/2017    Multiple allergies 01/23/2017    Mixed hyperlipidemia 01/23/2017     Current Outpatient Medications   Medication Sig Dispense Refill    albuterol 108 (90 Base) MCG/ACT Aero Soln inhalation aerosol INHALE 2 PUFFS BY MOUTH EVERY 4 TO 6 HOURS AS NEEDED      fenofibrate (TRICOR) 145 MG Tab Take 1 Tablet by mouth every day. 90 Tablet 3    WIXELA INHUB 250-50 MCG/ACT AEROSOL POWDER, BREATH ACTIVATED Inhale 1 Puff 2 times a day.      Calcium Carbonate-Vitamin D  (CALTRATE 600+D PO) Take  by mouth.      Multiple Vitamins-Minerals (CENTRUM SILVER) Chew Tab Take  by mouth.      fluticasone (FLONASE) 50 MCG/ACT nasal spray Spray 1 Spray in nose every day.      fexofenadine (ALLEGRA) 60 MG TABS Take 60 mg by mouth every day.       No current facility-administered medications for this visit.          Current supplements as per medication list.     Allergies: Cephalexin, Food, and Levaquin    Current social contact/activities: GYM, has a home elliptical she uses every morning, walks. Breakfast with friends every once in a while.    She  reports that she quit smoking about 23 years ago. Her smoking use included cigarettes. She started smoking about 49 years ago. She has a 25.4 pack-year smoking history. She has never used smokeless tobacco. She reports that she does not drink alcohol and does not use drugs.  Counseling given: Yes    ROS:    Gait: Uses no assistive device  Ostomy: No  Other tubes: No  Amputations: No  Chronic oxygen use: No  Last eye exam: 2019  Wears hearing aids: No   : Reports urinary leakage during the last 6 months that has not interfered at all with their daily activities or sleep.    Screening:    Depression Screening  Little interest or pleasure in doing things?  0 - not at all  Feeling down, depressed , or hopeless? 0 - not at all  Patient Health Questionnaire Score: 0     If depressive symptoms identified deferred to follow up visit unless specifically addressed in assessment and plan.    Interpretation of PHQ-9 Total Score   Score Severity   1-4 No Depression   5-9 Mild Depression   10-14 Moderate Depression   15-19 Moderately Severe Depression   20-27 Severe Depression    Screening for Cognitive Impairment  Do you or any of your friends or family members have any concern about your memory? No  Three Minute Recall (Banana, Sunrise, Chair) 3/3    Nate clock face with all 12 numbers and set the hands to show 20 past 8.  Yes    Cognitive concerns  identified deferred for follow up unless specifically addressed in assessment and plan.    Fall Risk Assessment  Has the patient had two or more falls in the last year or any fall with injury in the last year?  No    Safety Assessment  Do you always wear your seatbelt?  Yes  Any changes to home needed to function safely? No  Difficulty hearing.  No  Patient counseled about all safety risks that were identified.    Functional Assessment ADLs  Are there any barriers preventing you from cooking for yourself or meeting nutritional needs?  No.    Are there any barriers preventing you from driving safely or obtaining transportation?  No.    Are there any barriers preventing you from using a telephone or calling for help?  No    Are there any barriers preventing you from shopping?  No.    Are there any barriers preventing you from taking care of your own finances?  No    Are there any barriers preventing you from managing your medications?  No    Are there any barriers preventing you from showering, bathing or dressing yourself? No    Are there any barriers preventing you from doing housework or laundry? No  Are there any barriers preventing you from using the toilet?No  Are you currently engaging in any exercise or physical activity?  Yes.      Self-Assessment of Health  What is your perception of your health? Good  Do you sleep more than six hours a night? Yes  In the past 7 days, how much did pain keep you from doing your normal work? None  Do you spend quality time with family or friends (virtually or in person)? Yes  Do you usually eat a heart healthy diet that constists of a variety of fruits, vegetables, whole grains and fiber? Yes  Do you eat foods high in fat and/or Fast Food more than three times per week? No    Advance Care Planning  Do you have an Advance Directive, Living Will, Durable Power of , or POLST? No    Health Maintenance Summary            Overdue - COVID-19 Vaccine (3 - 2023-24 season)  Overdue since 9/1/2023      10/29/2021  Imm Admin: PFIZER PURPLE CAP SARS-COV-2 VACCINATION (12+)    10/08/2021  Imm Admin: PFIZER PURPLE CAP SARS-COV-2 VACCINATION (12+)              Mammogram (Yearly) Next due on 9/23/2024 09/23/2023  MA-SCREENING MAMMO BILAT W/TOMOSYNTHESIS W/CAD    09/17/2022  MA-SCREENING MAMMO BILAT W/TOMOSYNTHESIS W/CAD    09/11/2021  [id number]    09/22/2020  [id number]    09/06/2019  [id number]    Only the first 5 history entries have been loaded, but more history exists.              Annual Wellness Visit (Yearly) Next due on 1/22/2025 01/22/2024  Visit Dx: Medicare annual wellness visit, subsequent    01/22/2024  Subsequent Annual Wellness Visit - Includes PPPS ()    01/17/2023  Visit Dx: Medicare annual wellness visit, subsequent    01/17/2023  Subsequent Annual Wellness Visit - Includes PPPS ()    07/06/2022  Visit Dx: Medicare annual wellness visit, subsequent    Only the first 5 history entries have been loaded, but more history exists.              Bone Density Scan (Every 2 Years) Next due on 8/18/2025 08/18/2023  DS-BONE DENSITY STUDY (DEXA)    08/04/2021  DS-BONE DENSITY STUDY (DEXA)    05/08/2019  DS-BONE DENSITY STUDY (DEXA)              Colorectal Cancer Screening (Colonoscopy - Every 5 Years) Next due on 6/16/2028 06/16/2023  AMB EXTERNAL COLONOSCOPY RESULTS    06/16/2023  AMB EXTERNAL COLONOSCOPY RESULTS    06/24/2020  REFERRAL TO GI FOR COLONOSCOPY    04/29/2015  REFERRAL TO GI FOR COLONOSCOPY    04/13/2011  REFERRAL TO GI FOR COLONOSCOPY    Only the first 5 history entries have been loaded, but more history exists.              IMM DTaP/Tdap/Td Vaccine (2 - Td or Tdap) Next due on 6/29/2029 06/29/2019  Imm Admin: Tdap Vaccine              Hepatitis C Screening  Completed      06/25/2019  Hepatitis C Antibody component of HEP C VIRUS ANTIBODY              Pneumococcal Vaccine: 65+ Years (Series  Information) Completed      2020  Imm Admin: Pneumococcal polysaccharide vaccine (PPSV-23)    2018  Imm Admin: Pneumococcal Conjugate Vaccine (Prevnar/PCV-13)    10/13/2017  Imm Admin: Pneumococcal Conjugate Vaccine (Prevnar/PCV-13)              Influenza Vaccine (Series Information) Completed      10/16/2023  Imm Admin: Influenza Vaccine Adult HD    10/06/2022  Imm Admin: Influenza Vaccine Adult HD    10/06/2022  Imm Admin: Influenza Vaccine Adult HD    2021  Imm Admin: Influenza Vaccine Adult HD    2020  Imm Admin: Influenza Vaccine Adult HD    Only the first 5 history entries have been loaded, but more history exists.              Hepatitis A Vaccine (Hep A) (Series Information) Aged Out      No completion history exists for this topic.              HPV Vaccines (Series Information) Aged Out      No completion history exists for this topic.              Polio Vaccine (Inactivated Polio) (Series Information) Aged Out      No completion history exists for this topic.              Meningococcal Immunization (Series Information) Aged Out      No completion history exists for this topic.              Discontinued - Hepatitis B Vaccine (Hep B)  Discontinued      No completion history exists for this topic.              Discontinued - Zoster (Shingles) Vaccines  Discontinued      2019  Imm Admin: Zoster Vaccine Recombinant (RZV) (SHINGRIX)    10/07/2014  Imm Admin: Zoster Vaccine Live (ZVL) (Zostavax) - HISTORICAL DATA                  Patient Care Team:  LESLYE Almazan as PCP - General (Family Medicine)  Cornelia Ruffin M.D. as Consulting Physician (Allergy and Immunology)    Social History     Tobacco Use    Smoking status: Former     Current packs/day: 0.00     Average packs/day: 1 pack/day for 25.4 years (25.4 ttl pk-yrs)     Types: Cigarettes     Start date: 1975     Quit date: 2000     Years since quittin.6    Smokeless tobacco: Never   Vaping Use    Vaping  "Use: Never used   Substance Use Topics    Alcohol use: Never    Drug use: No     Family History   Problem Relation Age of Onset    Cancer Mother         lymphoma    Stroke Father     Hypertension Father     Cancer Sister         colon    Heart Disease Brother 50        CHF    Other Daughter         Factor V    No Known Problems Maternal Grandmother     No Known Problems Maternal Grandfather     No Known Problems Paternal Grandmother     No Known Problems Paternal Grandfather     Hypertension Sister     Heart Disease Sister         stent    Leukemia Brother     Other Brother         Factor V    No Known Problems Brother     Heart Disease Brother         open heart surgery    No Known Problems Brother     Pulmonary Embolism Brother      She  has a past medical history of Arthritis, ASTHMA, Breath shortness, Factor 5 Leiden mutation, heterozygous (HCC), Hypercholesterolemia, and 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   Past Surgical History:   Procedure Laterality Date    JOSELITO BY LAPAROSCOPY N/A 6/19/2015    Procedure: JOSELITO BY LAPAROSCOPY;  Surgeon: Grace Montgomery M.D.;  Location: SURGERY [facility];  Service:     GYN SURGERY  1993    hysterectomy    ABDOMINAL HYSTERECTOMY TOTAL      NASAL POLYPECTOMY       Exam:   /78   Pulse 85   Temp 35.9 °C (96.7 °F) (Temporal)   Resp (!) 24   Ht 1.6 m (5' 3\")   Wt 86.2 kg (190 lb)   SpO2 93%  Body mass index is 33.66 kg/m².    Hearing good.    Dentition fair, has a tooth that is bothering her, needs to see her dentist.  Alert, oriented in no acute distress.  Eye contact is good, speech goal directed, affect calm    Assessment and Plan. The following treatment and monitoring plan is recommended:    1. Medicare annual wellness visit, subsequent  - Subsequent Annual Wellness Visit - Includes PPPS ()    2. Mixed hyperlipidemia  Chronic, ongoing.  Continue fenofibrate 145 mg daily. Due for updated annual labs in July 2024 prior to follow-up.  - Comp Metabolic Panel; " Future  - Lipid Profile; Future  - TSH WITH REFLEX TO FT4; Future    3. Obesity (BMI 30-39.9)  Chronic, ongoing.  Encourage diet high in fruits, vegetables, and fiber. And a diet low in salt, refined carbohydrates, cholesterol, saturated fat, and trans fatty acids.    Encourage a minimum of 30 minutes of moderate intensity aerobic exercise (eg, brisk walking) is recommended on five days each week. Or 30 minutes of vigorous-intensity aerobic exercise (eg, jogging) on three days each week.   Patient's body mass index is 33.66 kg/m². Exercise and nutrition counseling were performed at this visit.  Due for updated annual labs in July 2024 prior to follow-up.  - CBC WITH DIFFERENTIAL; Future  - Comp Metabolic Panel; Future  - Lipid Profile; Future  - TSH WITH REFLEX TO FT4; Future  - Patient identified as having weight management issue.  Appropriate orders and counseling given.    4. Mild intermittent asthma without complication  Chronic, ongoing.  Continue to follow with allergy and asthma specialist.  Continue Wixela 250-50 mcg/ACT 1 puff twice daily and albuterol as needed. Due for updated annual labs in July 2024 prior to follow-up.  - CBC WITH DIFFERENTIAL; Future    5. Age-related osteoporosis without current pathological fracture  Chronic, ongoing. Encourage patient to continue to take vitamin d and calcium supplement daily. Encourage a minimum of 150 minutes of weight bearing exercises weekly. Due for updated DEXA in August 2025. Due for updated annual labs in July 2024 prior to follow-up.   - TSH WITH REFLEX TO FT4; Future    6. Routine health maintenance  Due for updated annual labs in July 2024 prior to follow-up.  - CBC WITH DIFFERENTIAL; Future  - Comp Metabolic Panel; Future  - Lipid Profile; Future  - TSH WITH REFLEX TO FT4; Future     Services suggested: No services needed at this time  Health Care Screening: Age-appropriate preventive services recommended by USPTF and ACIP covered by Medicare were  discussed today. Services ordered if indicated and agreed upon by the patient.  Referrals offered: Community-based lifestyle interventions to reduce health risks and promote self-management and wellness, fall prevention, nutrition, physical activity, tobacco-use cessation, weight loss, and mental health services as per orders if indicated.    Discussion today about general wellness and lifestyle habits:    Prevent falls and reduce trip hazards; Cautioned about securing or removing rugs.  Have a working fire alarm and carbon monoxide detector;   Engage in regular physical activity and social activities     Follow-up: Return in about 6 months (around 7/22/2024) for Follow up Labs.    Please note that this dictation was created using voice recognition software. I have worked with consultants from the vendor as well as technical experts from [employer] to optimize the interface. I have made every reasonable attempt to correct obvious errors, but I expect that there are errors of grammar and possibly content that I did not discover before finalizing the note.

## 2024-01-23 NOTE — ASSESSMENT & PLAN NOTE
Chronic, ongoing.  Continues Wixela 250-50 mcg/ACT 1 puff twice daily and albuterol rescue inhaler as needed.  Continues to follow with allergy and asthma specialist.

## 2024-01-23 NOTE — ASSESSMENT & PLAN NOTE
Chronic, ongoing.  Continues vitamin D and calcium supplement daily.  Participates in weightbearing exercises most days of the week.  Denies recent falls or fractures.  Due for next updated DEXA in August 2025.

## 2024-01-23 NOTE — ASSESSMENT & PLAN NOTE
Chronic, ongoing.  Patient reports that she exercises every morning and tries hard to follow a healthy diet but continues to struggle to lose weight.

## 2024-01-23 NOTE — ASSESSMENT & PLAN NOTE
Chronic, ongoing.  Continues fenofibrate 145 mg daily, has not had any side effects or allergic reaction since starting generic in September 2023.  Reports that she exercises every morning and tries hard to follow a healthy diet but struggles to lose weight.  Due for annual labs in July 2024.

## 2024-06-20 ENCOUNTER — HOSPITAL ENCOUNTER (OUTPATIENT)
Dept: LAB | Facility: MEDICAL CENTER | Age: 74
End: 2024-06-20
Attending: NURSE PRACTITIONER
Payer: MEDICARE

## 2024-06-20 DIAGNOSIS — M81.0 AGE-RELATED OSTEOPOROSIS WITHOUT CURRENT PATHOLOGICAL FRACTURE: ICD-10-CM

## 2024-06-20 DIAGNOSIS — Z00.00 ROUTINE HEALTH MAINTENANCE: ICD-10-CM

## 2024-06-20 DIAGNOSIS — J45.20 MILD INTERMITTENT ASTHMA WITHOUT COMPLICATION: ICD-10-CM

## 2024-06-20 DIAGNOSIS — E66.9 OBESITY (BMI 30-39.9): ICD-10-CM

## 2024-06-20 DIAGNOSIS — E78.2 MIXED HYPERLIPIDEMIA: ICD-10-CM

## 2024-06-20 LAB
ALBUMIN SERPL BCP-MCNC: 4.1 G/DL (ref 3.2–4.9)
ALBUMIN/GLOB SERPL: 1.2 G/DL
ALP SERPL-CCNC: 52 U/L (ref 30–99)
ALT SERPL-CCNC: 23 U/L (ref 2–50)
ANION GAP SERPL CALC-SCNC: 13 MMOL/L (ref 7–16)
AST SERPL-CCNC: 26 U/L (ref 12–45)
BASOPHILS # BLD AUTO: 0.9 % (ref 0–1.8)
BASOPHILS # BLD: 0.05 K/UL (ref 0–0.12)
BILIRUB SERPL-MCNC: 0.3 MG/DL (ref 0.1–1.5)
BUN SERPL-MCNC: 16 MG/DL (ref 8–22)
CALCIUM ALBUM COR SERPL-MCNC: 9.5 MG/DL (ref 8.5–10.5)
CALCIUM SERPL-MCNC: 9.6 MG/DL (ref 8.5–10.5)
CHLORIDE SERPL-SCNC: 106 MMOL/L (ref 96–112)
CHOLEST SERPL-MCNC: 131 MG/DL (ref 100–199)
CO2 SERPL-SCNC: 24 MMOL/L (ref 20–33)
CREAT SERPL-MCNC: 0.83 MG/DL (ref 0.5–1.4)
EOSINOPHIL # BLD AUTO: 0.2 K/UL (ref 0–0.51)
EOSINOPHIL NFR BLD: 3.8 % (ref 0–6.9)
ERYTHROCYTE [DISTWIDTH] IN BLOOD BY AUTOMATED COUNT: 48.3 FL (ref 35.9–50)
FASTING STATUS PATIENT QL REPORTED: NORMAL
GFR SERPLBLD CREATININE-BSD FMLA CKD-EPI: 75 ML/MIN/1.73 M 2
GLOBULIN SER CALC-MCNC: 3.3 G/DL (ref 1.9–3.5)
GLUCOSE SERPL-MCNC: 96 MG/DL (ref 65–99)
HCT VFR BLD AUTO: 46.7 % (ref 37–47)
HDLC SERPL-MCNC: 28 MG/DL
HGB BLD-MCNC: 14.8 G/DL (ref 12–16)
IMM GRANULOCYTES # BLD AUTO: 0.01 K/UL (ref 0–0.11)
IMM GRANULOCYTES NFR BLD AUTO: 0.2 % (ref 0–0.9)
LDLC SERPL CALC-MCNC: 71 MG/DL
LYMPHOCYTES # BLD AUTO: 1.69 K/UL (ref 1–4.8)
LYMPHOCYTES NFR BLD: 31.9 % (ref 22–41)
MCH RBC QN AUTO: 31 PG (ref 27–33)
MCHC RBC AUTO-ENTMCNC: 31.7 G/DL (ref 32.2–35.5)
MCV RBC AUTO: 97.9 FL (ref 81.4–97.8)
MONOCYTES # BLD AUTO: 0.39 K/UL (ref 0–0.85)
MONOCYTES NFR BLD AUTO: 7.4 % (ref 0–13.4)
NEUTROPHILS # BLD AUTO: 2.96 K/UL (ref 1.82–7.42)
NEUTROPHILS NFR BLD: 55.8 % (ref 44–72)
NRBC # BLD AUTO: 0 K/UL
NRBC BLD-RTO: 0 /100 WBC (ref 0–0.2)
PLATELET # BLD AUTO: 249 K/UL (ref 164–446)
PMV BLD AUTO: 10.1 FL (ref 9–12.9)
POTASSIUM SERPL-SCNC: 4.4 MMOL/L (ref 3.6–5.5)
PROT SERPL-MCNC: 7.4 G/DL (ref 6–8.2)
RBC # BLD AUTO: 4.77 M/UL (ref 4.2–5.4)
SODIUM SERPL-SCNC: 143 MMOL/L (ref 135–145)
TRIGL SERPL-MCNC: 158 MG/DL (ref 0–149)
TSH SERPL DL<=0.005 MIU/L-ACNC: 1.75 UIU/ML (ref 0.38–5.33)
WBC # BLD AUTO: 5.3 K/UL (ref 4.8–10.8)

## 2024-06-20 PROCEDURE — 80053 COMPREHEN METABOLIC PANEL: CPT

## 2024-06-20 PROCEDURE — 36415 COLL VENOUS BLD VENIPUNCTURE: CPT

## 2024-06-20 PROCEDURE — 85025 COMPLETE CBC W/AUTO DIFF WBC: CPT

## 2024-06-20 PROCEDURE — 84443 ASSAY THYROID STIM HORMONE: CPT

## 2024-06-20 PROCEDURE — 80061 LIPID PANEL: CPT

## 2024-07-25 ENCOUNTER — OFFICE VISIT (OUTPATIENT)
Dept: MEDICAL GROUP | Facility: PHYSICIAN GROUP | Age: 74
End: 2024-07-25
Payer: MEDICARE

## 2024-07-25 ENCOUNTER — HOSPITAL ENCOUNTER (OUTPATIENT)
Dept: LAB | Facility: MEDICAL CENTER | Age: 74
End: 2024-07-25
Attending: NURSE PRACTITIONER
Payer: MEDICARE

## 2024-07-25 VITALS
HEIGHT: 63 IN | OXYGEN SATURATION: 93 % | SYSTOLIC BLOOD PRESSURE: 132 MMHG | WEIGHT: 191 LBS | HEART RATE: 80 BPM | TEMPERATURE: 98.2 F | BODY MASS INDEX: 33.84 KG/M2 | DIASTOLIC BLOOD PRESSURE: 84 MMHG | RESPIRATION RATE: 20 BRPM

## 2024-07-25 DIAGNOSIS — R53.83 OTHER FATIGUE: ICD-10-CM

## 2024-07-25 DIAGNOSIS — D75.89 MACROCYTOSIS WITHOUT ANEMIA: ICD-10-CM

## 2024-07-25 DIAGNOSIS — R71.8 OTHER ABNORMALITY OF RED BLOOD CELLS: ICD-10-CM

## 2024-07-25 DIAGNOSIS — R60.0 BILATERAL LOWER EXTREMITY EDEMA: ICD-10-CM

## 2024-07-25 LAB
25(OH)D3 SERPL-MCNC: 38 NG/ML (ref 30–100)
FOLATE SERPL-MCNC: 37.8 NG/ML
IRON SATN MFR SERPL: 29 % (ref 15–55)
IRON SERPL-MCNC: 129 UG/DL (ref 40–170)
TIBC SERPL-MCNC: 445 UG/DL (ref 250–450)
UIBC SERPL-MCNC: 316 UG/DL (ref 110–370)
VIT B12 SERPL-MCNC: 652 PG/ML (ref 211–911)

## 2024-07-25 PROCEDURE — 82746 ASSAY OF FOLIC ACID SERUM: CPT

## 2024-07-25 PROCEDURE — 82306 VITAMIN D 25 HYDROXY: CPT | Mod: GA

## 2024-07-25 PROCEDURE — 3075F SYST BP GE 130 - 139MM HG: CPT | Performed by: NURSE PRACTITIONER

## 2024-07-25 PROCEDURE — 83540 ASSAY OF IRON: CPT

## 2024-07-25 PROCEDURE — 3079F DIAST BP 80-89 MM HG: CPT | Performed by: NURSE PRACTITIONER

## 2024-07-25 PROCEDURE — 99213 OFFICE O/P EST LOW 20 MIN: CPT | Performed by: NURSE PRACTITIONER

## 2024-07-25 PROCEDURE — 1170F FXNL STATUS ASSESSED: CPT | Performed by: NURSE PRACTITIONER

## 2024-07-25 PROCEDURE — 36415 COLL VENOUS BLD VENIPUNCTURE: CPT

## 2024-07-25 PROCEDURE — 83550 IRON BINDING TEST: CPT

## 2024-07-25 PROCEDURE — 82607 VITAMIN B-12: CPT

## 2024-07-25 ASSESSMENT — FIBROSIS 4 INDEX: FIB4 SCORE: 1.57

## 2024-08-06 ENCOUNTER — HOSPITAL ENCOUNTER (OUTPATIENT)
Dept: CARDIOLOGY | Facility: MEDICAL CENTER | Age: 74
End: 2024-08-06
Attending: NURSE PRACTITIONER
Payer: MEDICARE

## 2024-08-06 DIAGNOSIS — R53.83 OTHER FATIGUE: ICD-10-CM

## 2024-08-06 DIAGNOSIS — R60.0 BILATERAL LOWER EXTREMITY EDEMA: ICD-10-CM

## 2024-08-06 LAB
LV EJECT FRACT  99904: 63
LV EJECT FRACT MOD 2C 99903: 61
LV EJECT FRACT MOD 4C 99902: 65.12
LV EJECT FRACT MOD BP 99901: 63.66

## 2024-08-06 PROCEDURE — 93306 TTE W/DOPPLER COMPLETE: CPT | Mod: 26 | Performed by: INTERNAL MEDICINE

## 2024-08-06 PROCEDURE — 93306 TTE W/DOPPLER COMPLETE: CPT

## 2024-08-20 ENCOUNTER — OFFICE VISIT (OUTPATIENT)
Dept: MEDICAL GROUP | Facility: PHYSICIAN GROUP | Age: 74
End: 2024-08-20
Payer: MEDICARE

## 2024-08-20 VITALS
BODY MASS INDEX: 33.81 KG/M2 | TEMPERATURE: 98.4 F | HEART RATE: 74 BPM | DIASTOLIC BLOOD PRESSURE: 72 MMHG | SYSTOLIC BLOOD PRESSURE: 122 MMHG | WEIGHT: 190.8 LBS | OXYGEN SATURATION: 94 % | HEIGHT: 63 IN

## 2024-08-20 DIAGNOSIS — E78.2 MIXED HYPERLIPIDEMIA: ICD-10-CM

## 2024-08-20 DIAGNOSIS — Z12.31 ENCOUNTER FOR SCREENING MAMMOGRAM FOR BREAST CANCER: ICD-10-CM

## 2024-08-20 DIAGNOSIS — E66.9 OBESITY (BMI 30-39.9): ICD-10-CM

## 2024-08-20 PROCEDURE — 3078F DIAST BP <80 MM HG: CPT | Performed by: NURSE PRACTITIONER

## 2024-08-20 PROCEDURE — 99213 OFFICE O/P EST LOW 20 MIN: CPT | Performed by: NURSE PRACTITIONER

## 2024-08-20 PROCEDURE — 1170F FXNL STATUS ASSESSED: CPT | Performed by: NURSE PRACTITIONER

## 2024-08-20 PROCEDURE — 3074F SYST BP LT 130 MM HG: CPT | Performed by: NURSE PRACTITIONER

## 2024-08-20 ASSESSMENT — FIBROSIS 4 INDEX: FIB4 SCORE: 1.57

## 2024-08-31 ASSESSMENT — ENCOUNTER SYMPTOMS: EYE DISCHARGE: 1

## 2024-08-31 NOTE — PROGRESS NOTES
"Verbal consent was acquired by the patient to use [employer] ambient listening note generation during this visit Yes      Subjective   Greta Tran is a 74 y.o. female who presents for:  History of Present Illness  The patient presents for evaluation of multiple medical concerns.    She has been maintaining a regular exercise routine, visiting the gym four times a week. This regimen appears to be beneficial as she reports improved sleep patterns. Additionally, her blood pressure readings have been within the normal range.    She expresses concern about the presence of large blood cells in her blood, as indicated by her lab results. She is worried about the potential onset of liver disease. She reports no alcohol consumption. She is currently on fenofibrate and wonders if this medication could be contributing to her lab results.    She is making efforts to lose weight but finds it challenging. She is considering trying Aidee supplements and keto gummies, which she has seen advertised on television and Facebook, and is seeking advice on their safety and efficacy.    She also mentions having a tooth with a hole in it that needs to be addressed. Additionally, she needs to get her eyes checked because they water intermittently without a clear cause.    Review of Systems   Eyes:  Positive for discharge.        Watery eyes   All other systems reviewed and are negative.    Objective   /72 (BP Location: Right arm, Patient Position: Sitting, BP Cuff Size: Adult)   Pulse 74   Temp 36.9 °C (98.4 °F) (Temporal)   Ht 1.6 m (5' 3\")   Wt 86.5 kg (190 lb 12.8 oz)   SpO2 94%   Physical Exam  General: Well nourished, well developed female in NAD, awake and conversant.  Eyes: Normal conjunctiva, anicteric.  Round symmetrical pupils.  ENT: Hearing grossly intact.  No nasal discharge.  Neck: Neck is supple.  No masses or thyromegaly.  CV: No lower extremity edema.  Respiratory: Respirations are nonlabored.  No " wheezing.  Abdomen: Non-Distended.  Skin: Warm.  No rashes or ulcers.  MSK: Normal ambulation.  No clubbing or cyanosis.  Neuro: Sensation and CN II-XII grossly normal.  Psych: Alert and oriented.  Cooperative, appropriate mood and affect, normal judgment.      Results  Laboratory Studies  MCV was elevated. MCHC was low. B12 levels were normal. Folic acid levels were normal. Iron levels were normal. AST, ALT, alkaline phosphatase, bilirubin, albumin, total protein, globulin were all normal.    Imaging  Echocardiogram showed normal left ventricular size, wall thickness and systolic function. Grade 1 diastolic function was observed. Right ventricle had normal ventricular size and systolic function. Ejection fraction was 63.7%. Right Atrium and left atrium were normal. Mitral valve was structurally normal with no stenosis. Trace aortic insufficiency was observed. Tricuspid valve had no significant stenosis and trace regurgitation. Pulmonic valve had no stenosis and mild floppiness. Aorta and aortic arch were normal.     Assessment & Plan  1. Mixed hyperlipidemia  2. Obesity (BMI 30-39.9)  Chronic, ongoing. Continue fenofibrate 145 mg daily. Due for annual labs in June 2025. Reviewed recent echocardiogram with her. Her blood pressure is well-controlled. She has lost 1 pound since her last visit. Overall, her cardiac health is satisfactory. She is advised to continue her current lifestyle and exercise routine.    3. Encounter for screening mammogram for breast cancer  Due for screening after 9/23/2024.  - MA-SCREENING MAMMO BILAT W/TOMOSYNTHESIS W/CAD; Future     Return in about 5 months (around 1/22/2025) for AWV.     Please note that this dictation was created using voice recognition software. I have made every reasonable attempt to correct obvious errors, but I expect that there are errors of grammar and possibly content that I did not discover before finalizing the note.

## 2025-01-14 DIAGNOSIS — E78.2 MIXED HYPERLIPIDEMIA: ICD-10-CM

## 2025-01-14 RX ORDER — FENOFIBRATE 145 MG/1
145 TABLET, COATED ORAL DAILY
Qty: 90 TABLET | Refills: 3 | Status: SHIPPED | OUTPATIENT
Start: 2025-01-14

## 2025-01-14 NOTE — TELEPHONE ENCOUNTER
Requested Prescriptions     Pending Prescriptions Disp Refills    fenofibrate (TRICOR) 145 MG Tab [Pharmacy Med Name: Fenofibrate 145 MG Oral Tablet] 90 Tablet 3     Sig: Take 1 tablet by mouth once daily       SHANNAN Almazan.

## 2025-01-14 NOTE — TELEPHONE ENCOUNTER
Received request via: Pharmacy    Was the patient seen in the last year in this department? Yes    Does the patient have an active prescription (recently filled or refills available) for medication(s) requested? No    Pharmacy Name: [facility]    Does the patient have correction Plus and need 100-day supply? (This applies to ALL medications) Patient does not have SCP

## 2025-02-06 ENCOUNTER — OFFICE VISIT (OUTPATIENT)
Dept: MEDICAL GROUP | Facility: PHYSICIAN GROUP | Age: 75
End: 2025-02-06
Payer: MEDICARE

## 2025-02-06 ENCOUNTER — RESEARCH ENCOUNTER (OUTPATIENT)
Dept: RESEARCH | Facility: MEDICAL CENTER | Age: 75
End: 2025-02-06

## 2025-02-06 VITALS
TEMPERATURE: 97.8 F | WEIGHT: 183.3 LBS | BODY MASS INDEX: 32.48 KG/M2 | HEART RATE: 88 BPM | SYSTOLIC BLOOD PRESSURE: 128 MMHG | DIASTOLIC BLOOD PRESSURE: 66 MMHG | HEIGHT: 63 IN | OXYGEN SATURATION: 96 %

## 2025-02-06 DIAGNOSIS — M81.0 AGE-RELATED OSTEOPOROSIS WITHOUT CURRENT PATHOLOGICAL FRACTURE: ICD-10-CM

## 2025-02-06 DIAGNOSIS — Z78.0 POSTMENOPAUSAL: ICD-10-CM

## 2025-02-06 DIAGNOSIS — Z00.00 ROUTINE HEALTH MAINTENANCE: ICD-10-CM

## 2025-02-06 DIAGNOSIS — E78.2 MIXED HYPERLIPIDEMIA: ICD-10-CM

## 2025-02-06 DIAGNOSIS — Z00.00 MEDICARE ANNUAL WELLNESS VISIT, SUBSEQUENT: ICD-10-CM

## 2025-02-06 DIAGNOSIS — E66.9 OBESITY (BMI 30-39.9): ICD-10-CM

## 2025-02-06 PROCEDURE — G0439 PPPS, SUBSEQ VISIT: HCPCS | Performed by: NURSE PRACTITIONER

## 2025-02-06 PROCEDURE — 3078F DIAST BP <80 MM HG: CPT | Performed by: NURSE PRACTITIONER

## 2025-02-06 PROCEDURE — 1170F FXNL STATUS ASSESSED: CPT | Performed by: NURSE PRACTITIONER

## 2025-02-06 PROCEDURE — 3074F SYST BP LT 130 MM HG: CPT | Performed by: NURSE PRACTITIONER

## 2025-02-06 ASSESSMENT — PATIENT HEALTH QUESTIONNAIRE - PHQ9: CLINICAL INTERPRETATION OF PHQ2 SCORE: 0

## 2025-02-06 ASSESSMENT — ACTIVITIES OF DAILY LIVING (ADL): BATHING_REQUIRES_ASSISTANCE: 0

## 2025-02-06 ASSESSMENT — FIBROSIS 4 INDEX: FIB4 SCORE: 1.57

## 2025-02-06 ASSESSMENT — ENCOUNTER SYMPTOMS: GENERAL WELL-BEING: GOOD

## 2025-02-06 NOTE — PROGRESS NOTES
Chief Complaint   Patient presents with    Medicare Annual Wellness   Verbal consent was acquired by the patient to use [employer] ambient listening note generation during this visit Yes      HPI:  Greta Tran is a 74 y.o. here for Medicare Annual Wellness Visit     History of Present Illness  The patient presents for an annual wellness visit.    She is current with her vaccinations and has a bone density test due in 08/2024. Her mammogram is due in 10/2024, and she is not required to undergo another colonoscopy after the upcoming one. She has been informed that her colon is twisted, which she attributes to inadequate fiber intake. She has initiated a regimen of Citrucel every other day, which appears to be beneficial. She reports a weight loss of approximately half a pound per day since starting Citrucel. She has not had an eye exam recently and plans to schedule one. She is also under the care of an allergist. She experiences urinary leakage when sitting for extended periods but can manage it with planning. She reports good hearing and dental health. She does not require any medication refills at this time. She has been prescribed albuterol but does not use it frequently. She continues to use Flonase, Allegra, a multivitamin, vitamin D, calcium, and Caltrate chewables.    MEDICATIONS  Wixela inhaler, fenofibrate, albuterol, Flonase, Allegra, multivitamin, vitamin D, calcium, Citrucel.    IMMUNIZATIONS  She is up to date on her vaccines.     Patient Active Problem List    Diagnosis Date Noted    Left foot pain 01/24/2022    Osteoarthritis of left shoulder 06/21/2021    Enlarged lymph node in neck 07/30/2020    Obesity (BMI 30-39.9) 01/08/2020    Age-related osteoporosis without current pathological fracture 05/08/2019    Mild intermittent asthma without complication 01/23/2017    Multiple allergies 01/23/2017    Mixed hyperlipidemia 01/23/2017     Current Outpatient Medications   Medication Sig Dispense  Refill    fenofibrate (TRICOR) 145 MG Tab Take 1 tablet by mouth once daily 90 Tablet 3    albuterol 108 (90 Base) MCG/ACT Aero Soln inhalation aerosol INHALE 2 PUFFS BY MOUTH EVERY 4 TO 6 HOURS AS NEEDED      WIXELA INHUB 250-50 MCG/ACT AEROSOL POWDER, BREATH ACTIVATED Inhale 1 Puff 2 times a day.      Calcium Carbonate-Vitamin D (CALTRATE 600+D PO) Take  by mouth.      Multiple Vitamins-Minerals (CENTRUM SILVER) Chew Tab Take  by mouth.      fluticasone (FLONASE) 50 MCG/ACT nasal spray Spray 1 Spray in nose every day.      fexofenadine (ALLEGRA) 60 MG TABS Take 60 mg by mouth every day.       No current facility-administered medications for this visit.          Current supplements as per medication list.     Allergies: Cephalexin, Food, and Levaquin    Current social contact/activities: she likes to watch tv and spends time outdoors when the weather is warm      She  reports that she quit smoking about 24 years ago. Her smoking use included cigarettes. She started smoking about 50 years ago. She has a 25.4 pack-year smoking history. She has never used smokeless tobacco. She reports that she does not drink alcohol and does not use drugs.  Counseling given: Yes    ROS:    Gait: Uses no assistive device  Ostomy: No  Other tubes: No  Amputations: No  Chronic oxygen use: No  Last eye exam: unknown  Wears hearing aids: No   : Reports urinary leakage during the last 6 months that has not interfered at all with their daily activities or sleep.    Screening:    Depression Screening  Little interest or pleasure in doing things?  0 - not at all  Feeling down, depressed , or hopeless? 0 - not at all  Patient Health Questionnaire Score: 0     If depressive symptoms identified deferred to follow up visit unless specifically addressed in assessment and plan.    Interpretation of PHQ-9 Total Score   Score Severity   1-4 No Depression   5-9 Mild Depression   10-14 Moderate Depression   15-19 Moderately Severe Depression   20-27  Severe Depression    Screening for Cognitive Impairment  Do you or any of your friends or family members have any concern about your memory? No  Three Minute Recall (Leader, Season, Table) 3/3    Nate clock face with all 12 numbers and set the hands to show 10 minutes after 11.  Yes    Cognitive concerns identified deferred for follow up unless specifically addressed in assessment and plan.    Fall Risk Assessment  Has the patient had two or more falls in the last year or any fall with injury in the last year?  No    Safety Assessment  Do you always wear your seatbelt?  Yes  Any changes to home needed to function safely? No  Difficulty hearing.  Yes  Patient counseled about all safety risks that were identified.    Functional Assessment ADLs  Are there any barriers preventing you from cooking for yourself or meeting nutritional needs?  No.    Are there any barriers preventing you from driving safely or obtaining transportation?  No.    Are there any barriers preventing you from using a telephone or calling for help?  No    Are there any barriers preventing you from shopping?  No.    Are there any barriers preventing you from taking care of your own finances?  No    Are there any barriers preventing you from managing your medications?  No    Are there any barriers preventing you from showering, bathing or dressing yourself? No    Are there any barriers preventing you from doing housework or laundry? No  Are there any barriers preventing you from using the toilet?No  Are you currently engaging in any exercise or physical activity?  Yes. She goes to the gym 4x weekly    Self-Assessment of Health  What is your perception of your health? Good    Do you sleep more than six hours a night? Yes    In the past 7 days, how much did pain keep you from doing your normal work? None    Do you spend quality time with family or friends (virtually or in person)? Yes    Do you usually eat a heart healthy diet that constists of a  variety of fruits, vegetables, whole grains and fiber? Yes    Do you eat foods high in fat and/or Fast Food more than three times per week? No    How concerned are you that your medical conditions are not being well managed? Not at all    Are you worried that in the next 2 months, you may not have stable housing that you own, rent, or stay in as part of a household? No      Advance Care Planning  Do you have an Advance Directive, Living Will, Durable Power of , or POLST? No    Health Maintenance Summary            Overdue - COVID-19 Vaccine (3 - 2024-25 season) Overdue since 9/1/2024      10/29/2021  Imm Admin: PFIZER PURPLE CAP SARS-COV-2 VACCINATION (12+)    10/08/2021  Imm Admin: PFIZER PURPLE CAP SARS-COV-2 VACCINATION (12+)              Bone Density Scan (Every 2 Years) Order placed this encounter      08/18/2023  DS-BONE DENSITY STUDY (DEXA)    08/04/2021  DS-BONE DENSITY STUDY (DEXA)    05/08/2019  DS-BONE DENSITY STUDY (DEXA)              Mammogram (Yearly) Next due on 10/17/2025      10/17/2024  MA-SCREENING MAMMO BILAT W/TOMOSYNTHESIS W/CAD    09/23/2023  MA-SCREENING MAMMO BILAT W/TOMOSYNTHESIS W/CAD    09/17/2022  MA-SCREENING MAMMO BILAT W/TOMOSYNTHESIS W/CAD    09/11/2021  [id number]    09/22/2020  [id number]    Only the first 5 history entries have been loaded, but more history exists.              Annual Wellness Visit (Yearly) Order placed this encounter      02/06/2025  Visit Dx: Medicare annual wellness visit, subsequent    01/22/2024  Visit Dx: Medicare annual wellness visit, subsequent    01/22/2024  Subsequent Annual Wellness Visit - Includes PPPS ()    01/17/2023  Visit Dx: Medicare annual wellness visit, subsequent    01/17/2023  Subsequent Annual Wellness Visit - Includes PPPS ()    Only the first 5 history entries have been loaded, but more history exists.              Colorectal Cancer Screening (Colonoscopy - Every 5 Years) Next due on 6/16/2028       06/16/2023  AMB EXTERNAL COLONOSCOPY RESULTS    06/16/2023  AMB EXTERNAL COLONOSCOPY RESULTS    06/24/2020  REFERRAL TO GI FOR COLONOSCOPY    04/29/2015  REFERRAL TO GI FOR COLONOSCOPY    04/13/2011  REFERRAL TO GI FOR COLONOSCOPY    Only the first 5 history entries have been loaded, but more history exists.              IMM DTaP/Tdap/Td Vaccine (2 - Td or Tdap) Next due on 6/29/2029 06/29/2019  Imm Admin: Tdap Vaccine              Hepatitis C Screening  Completed      06/25/2019  Hepatitis C Antibody component of HEP C VIRUS ANTIBODY              Pneumococcal Vaccine: 65+ Years (Series Information) Completed      01/08/2020  Imm Admin: Pneumococcal polysaccharide vaccine (PPSV-23)    11/01/2018  Imm Admin: Pneumococcal Conjugate Vaccine (Prevnar/PCV-13)    10/13/2017  Imm Admin: Pneumococcal Conjugate Vaccine (Prevnar/PCV-13)              Influenza Vaccine (Series Information) Completed      10/22/2024  Imm Admin: Influenza, unspecified formulation    10/16/2023  Imm Admin: Influenza Vaccine Adult HD    10/06/2022  Imm Admin: Influenza Vaccine Adult HD    10/06/2022  Imm Admin: Influenza Vaccine Adult HD    09/25/2021  Imm Admin: Influenza Vaccine Adult HD    Only the first 5 history entries have been loaded, but more history exists.              Hepatitis A Vaccine (Hep A) (Series Information) Aged Out      No completion history exists for this topic.              HPV Vaccines (Series Information) Aged Out      No completion history exists for this topic.              Polio Vaccine (Inactivated Polio) (Series Information) Aged Out      No completion history exists for this topic.              Meningococcal Immunization (Series Information) Aged Out      No completion history exists for this topic.              Discontinued - Hepatitis B Vaccine (Hep B)  Discontinued      No completion history exists for this topic.              Discontinued - Zoster (Shingles) Vaccines  Discontinued      07/31/2019  Imm  Admin: Zoster Vaccine Recombinant (RZV) (SHINGRIX)    10/07/2014  Imm Admin: Zoster Vaccine Live (ZVL) (Zostavax) - HISTORICAL DATA                  Patient Care Team:  LESLYE Amlazan as PCP - General (Family Medicine)  Cornelia Ruffin M.D. as Consulting Physician (Allergy and Immunology)    Social History     Tobacco Use    Smoking status: Former     Current packs/day: 0.00     Average packs/day: 1 pack/day for 25.4 years (25.4 ttl pk-yrs)     Types: Cigarettes     Start date: 1975     Quit date: 2000     Years since quittin.7    Smokeless tobacco: Never   Vaping Use    Vaping status: Never Used   Substance Use Topics    Alcohol use: Never    Drug use: No     Family History   Problem Relation Age of Onset    Cancer Mother         lymphoma    Stroke Father     Hypertension Father     Cancer Sister         colon    Heart Disease Brother 50        CHF    Other Daughter         Factor V    No Known Problems Maternal Grandmother     No Known Problems Maternal Grandfather     No Known Problems Paternal Grandmother     No Known Problems Paternal Grandfather     Hypertension Sister     Heart Disease Sister         stent    Leukemia Brother     Other Brother         Factor V    No Known Problems Brother     Heart Disease Brother         open heart surgery    No Known Problems Brother     Pulmonary Embolism Brother      She  has a past medical history of Arthritis, ASTHMA, Breath shortness, Factor 5 Leiden mutation, heterozygous (HCC), Hypercholesterolemia, and Warthin's tumor (2020).   Past Surgical History:   Procedure Laterality Date    JOSELITO BY LAPAROSCOPY N/A 2015    Procedure: JOSELITO BY LAPAROSCOPY;  Surgeon: Grace Montgomery M.D.;  Location: SURGERY [facility];  Service:     GYN SURGERY      hysterectomy    ABDOMINAL HYSTERECTOMY TOTAL      NASAL POLYPECTOMY       Exam:   /66 (BP Location: Left arm, Patient Position: Sitting, BP Cuff Size: Adult)   Pulse 88   Temp  "36.6 °C (97.8 °F) (Temporal)   Ht 1.6 m (5' 3\")   Wt 83.1 kg (183 lb 4.8 oz)   SpO2 96%  Body mass index is 32.47 kg/m².    Hearing good.    Dentition good  Alert, oriented in no acute distress.  Eye contact is good, speech goal directed, affect calm    Assessment and Plan. The following treatment and monitoring plan is recommended:    1. Medicare annual wellness visit, subsequent  She is due for a bone density test in 08/2024, a mammogram in 10/2024, and colon cancer screening in 2028. Her tetanus vaccine is due in 2029. She has not had an eye exam recently and plans to schedule one. She will undergo annual labs in 08/2024. She has expressed interest in participating in the [employer] [place] Research Project for free genetic testing for liver health.  - Subsequent Annual Wellness Visit - Includes PPPS ()    2. Mixed hyperlipidemia  Chronic, ongoing.  Continue fenofibrate 145 mg daily. Due for updated annual labs prior to follow-up in August 2025.  - Comp Metabolic Panel; Future  - Lipid Profile; Future  - TSH WITH REFLEX TO FT4; Future    3. Obesity (BMI 30-39.9)  Chronic, improving.  Encourage diet high in fruits, vegetables, and fiber. And a diet low in salt, refined carbohydrates, cholesterol, saturated fat, and trans fatty acids.    Encourage a minimum of 30 minutes of moderate intensity aerobic exercise (eg, brisk walking) is recommended on five days each week. Or 30 minutes of vigorous-intensity aerobic exercise (eg, jogging) on three days each week.   Patient's body mass index is 32.47 kg/m². Exercise and nutrition counseling were performed at this visit.  Due for updated annual labs prior to follow-up in August 2025.  - CBC WITH DIFFERENTIAL; Future  - Comp Metabolic Panel; Future  - Lipid Profile; Future  - TSH WITH REFLEX TO FT4; Future  - Patient identified as having weight management issue.  Appropriate orders and counseling given.    4. Age-related osteoporosis without current pathological " fracture  5. Postmenopausal  Chronic, ongoing. Encourage patient to continue to take vitamin d and calcium supplement daily. Encourage a minimum of 150 minutes of weight bearing exercises weekly. Due for updated DEXA in August 2025. Due for updated annual labs prior to follow-up in August 2025.   - DS-BONE DENSITY STUDY (DEXA); Future  - TSH WITH REFLEX TO FT4; Future    6. Routine health maintenance  Due for updated annual labs prior to follow-up in August 2025.  - CBC WITH DIFFERENTIAL; Future  - Comp Metabolic Panel; Future  - Lipid Profile; Future  - TSH WITH REFLEX TO FT4; Future      Services suggested: No services needed at this time  Health Care Screening: Age-appropriate preventive services recommended by USPTF and ACIP covered by Medicare were discussed today. Services ordered if indicated and agreed upon by the patient.  Referrals offered: Community-based lifestyle interventions to reduce health risks and promote self-management and wellness, fall prevention, nutrition, physical activity, tobacco-use cessation, weight loss, and mental health services as per orders if indicated.    Discussion today about general wellness and lifestyle habits:    Prevent falls and reduce trip hazards; Cautioned about securing or removing rugs.  Have a working fire alarm and carbon monoxide detector;   Engage in regular physical activity and social activities     Follow-up: Return in about 1 year (around 2/6/2026) for AWV ---- end August 2025 lab review.    Please note that this dictation was created using voice recognition software. I have worked with consultants from the vendor as well as technical experts from [facility] to optimize the interface. I have made every reasonable attempt to correct obvious errors, but I expect that there are errors of grammar and possibly content that I did not discover before finalizing the note.

## 2025-08-12 ENCOUNTER — HOSPITAL ENCOUNTER (OUTPATIENT)
Dept: LAB | Facility: MEDICAL CENTER | Age: 75
End: 2025-08-12
Attending: NURSE PRACTITIONER
Payer: MEDICARE

## 2025-08-12 DIAGNOSIS — E78.2 MIXED HYPERLIPIDEMIA: ICD-10-CM

## 2025-08-12 DIAGNOSIS — Z00.00 ROUTINE HEALTH MAINTENANCE: ICD-10-CM

## 2025-08-12 DIAGNOSIS — M81.0 AGE-RELATED OSTEOPOROSIS WITHOUT CURRENT PATHOLOGICAL FRACTURE: ICD-10-CM

## 2025-08-12 DIAGNOSIS — Z78.0 POSTMENOPAUSAL: ICD-10-CM

## 2025-08-12 DIAGNOSIS — E66.9 OBESITY (BMI 30-39.9): ICD-10-CM

## 2025-08-12 LAB
ALBUMIN SERPL BCP-MCNC: 4.1 G/DL (ref 3.2–4.9)
ALBUMIN/GLOB SERPL: 1.2 G/DL
ALP SERPL-CCNC: 45 U/L (ref 30–99)
ALT SERPL-CCNC: 18 U/L (ref 2–50)
ANION GAP SERPL CALC-SCNC: 12 MMOL/L (ref 7–16)
AST SERPL-CCNC: 25 U/L (ref 12–45)
BASOPHILS # BLD AUTO: 0.9 % (ref 0–1.8)
BASOPHILS # BLD: 0.04 K/UL (ref 0–0.12)
BILIRUB SERPL-MCNC: 0.2 MG/DL (ref 0.1–1.5)
BUN SERPL-MCNC: 19 MG/DL (ref 8–22)
CALCIUM ALBUM COR SERPL-MCNC: 9.5 MG/DL (ref 8.5–10.5)
CALCIUM SERPL-MCNC: 9.6 MG/DL (ref 8.5–10.5)
CHLORIDE SERPL-SCNC: 107 MMOL/L (ref 96–112)
CHOLEST SERPL-MCNC: 132 MG/DL (ref 100–199)
CO2 SERPL-SCNC: 24 MMOL/L (ref 20–33)
CREAT SERPL-MCNC: 0.74 MG/DL (ref 0.5–1.4)
EOSINOPHIL # BLD AUTO: 0.23 K/UL (ref 0–0.51)
EOSINOPHIL NFR BLD: 5.2 % (ref 0–6.9)
ERYTHROCYTE [DISTWIDTH] IN BLOOD BY AUTOMATED COUNT: 45.2 FL (ref 35.9–50)
FASTING STATUS PATIENT QL REPORTED: NORMAL
GFR SERPLBLD CREATININE-BSD FMLA CKD-EPI: 85 ML/MIN/1.73 M 2
GLOBULIN SER CALC-MCNC: 3.3 G/DL (ref 1.9–3.5)
GLUCOSE SERPL-MCNC: 95 MG/DL (ref 65–99)
HCT VFR BLD AUTO: 43 % (ref 37–47)
HDLC SERPL-MCNC: 28 MG/DL
HGB BLD-MCNC: 14.1 G/DL (ref 12–16)
IMM GRANULOCYTES # BLD AUTO: 0.02 K/UL (ref 0–0.11)
IMM GRANULOCYTES NFR BLD AUTO: 0.5 % (ref 0–0.9)
LDLC SERPL CALC-MCNC: 75 MG/DL
LYMPHOCYTES # BLD AUTO: 1.6 K/UL (ref 1–4.8)
LYMPHOCYTES NFR BLD: 36.1 % (ref 22–41)
MCH RBC QN AUTO: 31.2 PG (ref 27–33)
MCHC RBC AUTO-ENTMCNC: 32.8 G/DL (ref 32.2–35.5)
MCV RBC AUTO: 95.1 FL (ref 81.4–97.8)
MONOCYTES # BLD AUTO: 0.42 K/UL (ref 0–0.85)
MONOCYTES NFR BLD AUTO: 9.5 % (ref 0–13.4)
NEUTROPHILS # BLD AUTO: 2.12 K/UL (ref 1.82–7.42)
NEUTROPHILS NFR BLD: 47.8 % (ref 44–72)
NRBC # BLD AUTO: 0 K/UL
NRBC BLD-RTO: 0 /100 WBC (ref 0–0.2)
PLATELET # BLD AUTO: 247 K/UL (ref 164–446)
PMV BLD AUTO: 10.1 FL (ref 9–12.9)
POTASSIUM SERPL-SCNC: 4.3 MMOL/L (ref 3.6–5.5)
PROT SERPL-MCNC: 7.4 G/DL (ref 6–8.2)
RBC # BLD AUTO: 4.52 M/UL (ref 4.2–5.4)
SODIUM SERPL-SCNC: 143 MMOL/L (ref 135–145)
TRIGL SERPL-MCNC: 144 MG/DL (ref 0–149)
TSH SERPL DL<=0.005 MIU/L-ACNC: 1.94 UIU/ML (ref 0.38–5.33)
WBC # BLD AUTO: 4.4 K/UL (ref 4.8–10.8)

## 2025-08-12 PROCEDURE — 80061 LIPID PANEL: CPT

## 2025-08-12 PROCEDURE — 84443 ASSAY THYROID STIM HORMONE: CPT

## 2025-08-12 PROCEDURE — 80053 COMPREHEN METABOLIC PANEL: CPT

## 2025-08-12 PROCEDURE — 85025 COMPLETE CBC W/AUTO DIFF WBC: CPT

## 2025-08-12 PROCEDURE — 36415 COLL VENOUS BLD VENIPUNCTURE: CPT

## 2025-08-19 ENCOUNTER — HOSPITAL ENCOUNTER (OUTPATIENT)
Dept: RADIOLOGY | Facility: MEDICAL CENTER | Age: 75
End: 2025-08-19
Attending: NURSE PRACTITIONER
Payer: MEDICARE

## 2025-08-19 DIAGNOSIS — Z78.0 POSTMENOPAUSAL: ICD-10-CM

## 2025-08-19 DIAGNOSIS — M81.0 AGE-RELATED OSTEOPOROSIS WITHOUT CURRENT PATHOLOGICAL FRACTURE: ICD-10-CM

## 2025-08-19 PROCEDURE — 77080 DXA BONE DENSITY AXIAL: CPT

## 2025-08-26 ENCOUNTER — HOSPITAL ENCOUNTER (OUTPATIENT)
Facility: MEDICAL CENTER | Age: 75
End: 2025-08-26
Attending: NURSE PRACTITIONER
Payer: MEDICARE

## 2025-08-26 ENCOUNTER — OFFICE VISIT (OUTPATIENT)
Dept: MEDICAL GROUP | Facility: PHYSICIAN GROUP | Age: 75
End: 2025-08-26
Payer: MEDICARE

## 2025-08-26 VITALS
HEIGHT: 63 IN | SYSTOLIC BLOOD PRESSURE: 122 MMHG | OXYGEN SATURATION: 95 % | DIASTOLIC BLOOD PRESSURE: 74 MMHG | HEART RATE: 77 BPM | WEIGHT: 185 LBS | TEMPERATURE: 98.1 F | BODY MASS INDEX: 32.78 KG/M2

## 2025-08-26 DIAGNOSIS — Z00.00 ROUTINE HEALTH MAINTENANCE: ICD-10-CM

## 2025-08-26 DIAGNOSIS — N89.8 VAGINAL ITCHING: ICD-10-CM

## 2025-08-26 DIAGNOSIS — E78.2 MIXED HYPERLIPIDEMIA: ICD-10-CM

## 2025-08-26 DIAGNOSIS — R30.0 DYSURIA: ICD-10-CM

## 2025-08-26 DIAGNOSIS — M85.89 OSTEOPENIA OF MULTIPLE SITES: ICD-10-CM

## 2025-08-26 LAB
APPEARANCE UR: CLEAR
BILIRUB UR STRIP-MCNC: NEGATIVE MG/DL
COLOR UR AUTO: YELLOW
GLUCOSE UR STRIP.AUTO-MCNC: NEGATIVE MG/DL
KETONES UR STRIP.AUTO-MCNC: NEGATIVE MG/DL
LEUKOCYTE ESTERASE UR QL STRIP.AUTO: NORMAL
NITRITE UR QL STRIP.AUTO: NEGATIVE
PH UR STRIP.AUTO: 6.5 [PH] (ref 5–8)
PROT UR QL STRIP: NEGATIVE MG/DL
RBC UR QL AUTO: NORMAL
SP GR UR STRIP.AUTO: 1.02
UROBILINOGEN UR STRIP-MCNC: 0.2 MG/DL

## 2025-08-26 PROCEDURE — 87086 URINE CULTURE/COLONY COUNT: CPT

## 2025-08-26 PROCEDURE — 87660 TRICHOMONAS VAGIN DIR PROBE: CPT

## 2025-08-26 PROCEDURE — 87510 GARDNER VAG DNA DIR PROBE: CPT

## 2025-08-26 PROCEDURE — 1170F FXNL STATUS ASSESSED: CPT | Performed by: NURSE PRACTITIONER

## 2025-08-26 PROCEDURE — 3074F SYST BP LT 130 MM HG: CPT | Performed by: NURSE PRACTITIONER

## 2025-08-26 PROCEDURE — 99214 OFFICE O/P EST MOD 30 MIN: CPT | Performed by: NURSE PRACTITIONER

## 2025-08-26 PROCEDURE — 81002 URINALYSIS NONAUTO W/O SCOPE: CPT | Performed by: NURSE PRACTITIONER

## 2025-08-26 PROCEDURE — 3078F DIAST BP <80 MM HG: CPT | Performed by: NURSE PRACTITIONER

## 2025-08-26 PROCEDURE — 87480 CANDIDA DNA DIR PROBE: CPT

## 2025-08-26 ASSESSMENT — FIBROSIS 4 INDEX: FIB4 SCORE: 1.74

## 2025-08-27 LAB
CANDIDA DNA VAG QL PROBE+SIG AMP: NEGATIVE
G VAGINALIS DNA VAG QL PROBE+SIG AMP: NEGATIVE
T VAGINALIS DNA VAG QL PROBE+SIG AMP: NEGATIVE

## 2025-08-28 LAB
BACTERIA UR CULT: NORMAL
SIGNIFICANT IND 70042: NORMAL
SITE SITE: NORMAL
SOURCE SOURCE: NORMAL